# Patient Record
Sex: FEMALE | Race: BLACK OR AFRICAN AMERICAN | NOT HISPANIC OR LATINO | Employment: UNEMPLOYED
[De-identification: names, ages, dates, MRNs, and addresses within clinical notes are randomized per-mention and may not be internally consistent; named-entity substitution may affect disease eponyms.]

---

## 2017-06-17 ENCOUNTER — HEALTH MAINTENANCE LETTER (OUTPATIENT)
Age: 22
End: 2017-06-17

## 2018-08-25 ENCOUNTER — HEALTH MAINTENANCE LETTER (OUTPATIENT)
Age: 23
End: 2018-08-25

## 2019-04-16 ENCOUNTER — OFFICE VISIT (OUTPATIENT)
Dept: FAMILY MEDICINE | Facility: CLINIC | Age: 24
End: 2019-04-16
Payer: COMMERCIAL

## 2019-04-16 VITALS
BODY MASS INDEX: 38.53 KG/M2 | RESPIRATION RATE: 16 BRPM | HEART RATE: 90 BPM | TEMPERATURE: 98.7 F | HEIGHT: 67 IN | WEIGHT: 245.5 LBS | OXYGEN SATURATION: 97 % | DIASTOLIC BLOOD PRESSURE: 74 MMHG | SYSTOLIC BLOOD PRESSURE: 115 MMHG

## 2019-04-16 DIAGNOSIS — Z00.00 ROUTINE GENERAL MEDICAL EXAMINATION AT A HEALTH CARE FACILITY: Primary | ICD-10-CM

## 2019-04-16 DIAGNOSIS — J30.1 SEASONAL ALLERGIC RHINITIS DUE TO POLLEN: ICD-10-CM

## 2019-04-16 DIAGNOSIS — Z11.1 SCREENING EXAMINATION FOR PULMONARY TUBERCULOSIS: ICD-10-CM

## 2019-04-16 DIAGNOSIS — Z11.59 NEED FOR HEPATITIS C SCREENING TEST: ICD-10-CM

## 2019-04-16 DIAGNOSIS — Z11.4 ENCOUNTER FOR SCREENING FOR HIV: ICD-10-CM

## 2019-04-16 DIAGNOSIS — Z72.0 NICOTINE ABUSE: ICD-10-CM

## 2019-04-16 DIAGNOSIS — F19.10 SUBSTANCE ABUSE (H): ICD-10-CM

## 2019-04-16 LAB — HCG SERPL QL: NEGATIVE

## 2019-04-16 PROCEDURE — 99385 PREV VISIT NEW AGE 18-39: CPT | Performed by: PHYSICIAN ASSISTANT

## 2019-04-16 PROCEDURE — 86481 TB AG RESPONSE T-CELL SUSP: CPT | Performed by: PHYSICIAN ASSISTANT

## 2019-04-16 PROCEDURE — 87340 HEPATITIS B SURFACE AG IA: CPT | Performed by: PHYSICIAN ASSISTANT

## 2019-04-16 PROCEDURE — 86708 HEPATITIS A ANTIBODY: CPT | Performed by: PHYSICIAN ASSISTANT

## 2019-04-16 PROCEDURE — 84703 CHORIONIC GONADOTROPIN ASSAY: CPT | Performed by: PHYSICIAN ASSISTANT

## 2019-04-16 PROCEDURE — 86706 HEP B SURFACE ANTIBODY: CPT | Performed by: PHYSICIAN ASSISTANT

## 2019-04-16 PROCEDURE — 86709 HEPATITIS A IGM ANTIBODY: CPT | Performed by: PHYSICIAN ASSISTANT

## 2019-04-16 PROCEDURE — 86803 HEPATITIS C AB TEST: CPT | Performed by: PHYSICIAN ASSISTANT

## 2019-04-16 PROCEDURE — 87389 HIV-1 AG W/HIV-1&-2 AB AG IA: CPT | Performed by: PHYSICIAN ASSISTANT

## 2019-04-16 PROCEDURE — 36415 COLL VENOUS BLD VENIPUNCTURE: CPT | Performed by: PHYSICIAN ASSISTANT

## 2019-04-16 RX ORDER — CETIRIZINE HYDROCHLORIDE 10 MG/1
10 TABLET ORAL DAILY
Qty: 30 TABLET | Refills: 3 | Status: SHIPPED | OUTPATIENT
Start: 2019-04-16 | End: 2019-09-20

## 2019-04-16 RX ORDER — OLANZAPINE 5 MG/1
5 TABLET ORAL
COMMUNITY
End: 2019-05-10

## 2019-04-16 RX ORDER — OLANZAPINE 10 MG/1
10 TABLET ORAL AT BEDTIME
COMMUNITY
End: 2019-05-21

## 2019-04-16 RX ORDER — NICOTINE 21 MG/24HR
1 PATCH, TRANSDERMAL 24 HOURS TRANSDERMAL EVERY 24 HOURS
Qty: 30 PATCH | Refills: 1 | Status: CANCELLED | OUTPATIENT
Start: 2019-04-16

## 2019-04-16 RX ORDER — FLUTICASONE PROPIONATE 50 MCG
2 SPRAY, SUSPENSION (ML) NASAL DAILY
Qty: 16 G | Refills: 3 | Status: SHIPPED | OUTPATIENT
Start: 2019-04-16 | End: 2019-09-20

## 2019-04-16 ASSESSMENT — MIFFLIN-ST. JEOR: SCORE: 1903.27

## 2019-04-16 NOTE — NURSING NOTE
"Chief Complaint   Patient presents with     Physical     Allergies     initial /74 (BP Location: Left arm, Cuff Size: Adult Large)   Pulse 90   Temp 98.7  F (37.1  C) (Oral)   Resp 16   Ht 1.689 m (5' 6.5\")   Wt 111.4 kg (245 lb 8 oz)   LMP 04/16/2019   SpO2 97%   BMI 39.03 kg/m   Estimated body mass index is 39.03 kg/m  as calculated from the following:    Height as of this encounter: 1.689 m (5' 6.5\").    Weight as of this encounter: 111.4 kg (245 lb 8 oz).  BP completed using cuff size: large.  L arm      Health Maintenance that is potentially due pending provider review:  Pap Smear    PAP--Possibly completing today, per Provider review    Molina Alan ma  "

## 2019-04-16 NOTE — PROGRESS NOTES
SUBJECTIVE:   CC: Monica Morales is an 23 year old woman who presents for preventive health visit.     Healthy Habits:    Do you get at least three servings of calcium containing foods daily (dairy, green leafy vegetables, etc.)? yes    Amount of exercise or daily activities, outside of work: 3-5 day(s) per week    Problems taking medications regularly No    Medication side effects: No    Have you had an eye exam in the past two years? no    Do you see a dentist twice per year? no    Do you have sleep apnea, excessive snoring or daytime drowsiness?no    22 y/o NP female here for check up.  She recently entered into [facility].  She has been there for 4 days, and everything is going well.  She is planning on being there long term.  She has hx of substance abuse, mostly with OTC cough syrups.  She is feeling confident in her recovery.    They have started olanzapine at night and BID prn for agitation, and it has been helping.  Tolerating well.  She was initially using nicotine patches, but she decided to stop.  Was getting rash, and making her feel weird.  She does get seasonal allergies, and requests flonase and anti histamine.  Worked well in the past.    She is currently on her period, and wishes to get pap smear in future.  PROBLEMS TO ADD ON...    Today's PHQ-2 Score: No flowsheet data found.    Abuse: Current or Past(Physical, Sexual or Emotional)- No  Do you feel safe in your environment? Yes    Social History     Tobacco Use     Smoking status: Former Smoker     Smokeless tobacco: Never Used   Substance Use Topics     Alcohol use: Not Currently     If you drink alcohol do you typically have >3 drinks per day or >7 drinks per week? No                     Reviewed orders with patient.  Reviewed health maintenance and updated orders accordingly - Yes  Labs reviewed in EPIC    Mammogram not appropriate for this patient based on age.    Pertinent mammograms are reviewed under the imaging  "tab.  History of abnormal Pap smear: NO - age 21-29 PAP every 3 years recommended     Reviewed and updated as needed this visit by clinical staff  Tobacco  Allergies  Meds  Soc Hx        Reviewed and updated as needed this visit by Provider            ROS:  CONSTITUTIONAL: NEGATIVE for fever, chills, change in weight  INTEGUMENTARU/SKIN: NEGATIVE for worrisome rashes, moles or lesions  EYES: NEGATIVE for vision changes or irritation  ENT: NEGATIVE for ear, mouth and throat problems  RESP: NEGATIVE for significant cough or SOB  BREAST: NEGATIVE for masses, tenderness or discharge  CV: NEGATIVE for chest pain, palpitations or peripheral edema  GI: NEGATIVE for nausea, abdominal pain, heartburn, or change in bowel habits  : NEGATIVE for unusual urinary or vaginal symptoms. Periods are regular.  MUSCULOSKELETAL: NEGATIVE for significant arthralgias or myalgia  NEURO: NEGATIVE for weakness, dizziness or paresthesias  PSYCHIATRIC: NEGATIVE for changes in mood or affect    OBJECTIVE:   /74 (BP Location: Left arm, Cuff Size: Adult Large)   Pulse 90   Temp 98.7  F (37.1  C) (Oral)   Resp 16   Ht 1.689 m (5' 6.5\")   Wt 111.4 kg (245 lb 8 oz)   LMP 04/16/2019   SpO2 97%   BMI 39.03 kg/m    EXAM:  GENERAL: alert, no distress and obese  EYES: Eyes grossly normal to inspection, PERRL and conjunctivae and sclerae normal  HENT: ear canals and TM's normal, nose and mouth without ulcers or lesions  NECK: no adenopathy, no asymmetry, masses, or scars and thyroid normal to palpation  RESP: lungs clear to auscultation - no rales, rhonchi or wheezes  CV: regular rate and rhythm, normal S1 S2, no S3 or S4, no murmur, click or rub, no peripheral edema and peripheral pulses strong  MS: no gross musculoskeletal defects noted, no edema  SKIN: no suspicious lesions or rashes  NEURO: Normal strength and tone, mentation intact and speech normal  PSYCH: mentation appears normal, affect normal/bright    Diagnostic Test " "Results:  none     ASSESSMENT/PLAN:   1. Routine general medical examination at a health care facility  Due for tdap in the fall.  Discussed guidelines for pap starting at age 21.  Will have her reschedule that at her discretion.  On period today.  - HCG qualitative, Blood ([id number])    2. Substance abuse (H)      3. Nicotine abuse  No longer on nicotine patches, doing ok.    4. Seasonal allergic rhinitis due to pollen    - fluticasone (FLONASE) 50 MCG/ACT nasal spray; Spray 2 sprays into both nostrils daily  Dispense: 16 g; Refill: 3  - cetirizine (ZYRTEC) 10 MG tablet; Take 1 tablet (10 mg) by mouth daily  Dispense: 30 tablet; Refill: 3    5. Encounter for screening for HIV    - HIV Antigen Antibody Combo    6. Need for hepatitis C screening test    - Hepatitis B Surface Antibody  - Hepatitis Antibody A IgG  - Hepatitis C antibody  - Hepatitis B surface antigen  - Hepatitis A antibody IgM    7. Screening examination for pulmonary tuberculosis    - Quantiferon TB Gold Plus    COUNSELING:   Reviewed preventive health counseling, as reflected in patient instructions       Regular exercise       Healthy diet/nutrition       Consider Hep C screening for patients born between 1945 and 1965       HIV screeninx in teen years, 1x in adult years, and at intervals if high risk    BP Readings from Last 1 Encounters:   19 115/74     Estimated body mass index is 39.03 kg/m  as calculated from the following:    Height as of this encounter: 1.689 m (5' 6.5\").    Weight as of this encounter: 111.4 kg (245 lb 8 oz).      Weight management plan: Discussed healthy diet and exercise guidelines     reports that she has quit smoking. She has never used smokeless tobacco.      Counseling Resources:  ATP IV Guidelines  Pooled Cohorts Equation Calculator  Breast Cancer Risk Calculator  FRAX Risk Assessment  ICSI Preventive Guidelines  Dietary Guidelines for Americans, 2010  USDA's MyPlate  ASA Prophylaxis  Lung CA " Screening    Daniel Escalante PA-C  [facility]

## 2019-04-17 LAB
HAV IGG SER QL IA: REACTIVE
HAV IGM SERPL QL IA: NONREACTIVE
HBV SURFACE AB SERPL IA-ACNC: 0.53 M[IU]/ML
HBV SURFACE AG SERPL QL IA: NONREACTIVE
HCV AB SERPL QL IA: NONREACTIVE
HIV 1+2 AB+HIV1 P24 AG SERPL QL IA: NONREACTIVE

## 2019-04-18 ENCOUNTER — TELEPHONE (OUTPATIENT)
Dept: FAMILY MEDICINE | Facility: CLINIC | Age: 24
End: 2019-04-18

## 2019-04-18 LAB
GAMMA INTERFERON BACKGROUND BLD IA-ACNC: 0.02 IU/ML
M TB IFN-G BLD-IMP: NEGATIVE
M TB IFN-G CD4+ BCKGRND COR BLD-ACNC: 9.14 IU/ML
MITOGEN IGNF BCKGRD COR BLD-ACNC: 0.02 IU/ML
MITOGEN IGNF BCKGRD COR BLD-ACNC: 0.02 IU/ML

## 2019-04-18 NOTE — TELEPHONE ENCOUNTER
Called mom   Pt at [facility] [phone]  Called and left VM for patient to call us back  Myesha MENDOZA RN

## 2019-04-18 NOTE — RESULT ENCOUNTER NOTE
Please call with results.    All labs look ok.  It does appear that she is immune to Hepatitis A, most likely from previous immunization.     Jorge Alberto Escalante PA-C

## 2019-04-18 NOTE — TELEPHONE ENCOUNTER
Notes recorded by Daniel Escalante PA-C on 4/18/2019 at 1:58 PM CDT    Please call with results.    All labs look ok.  It does appear that she is immune to Hepatitis A, most likely from previous immunization.     Jorge Alberto Escalante PA-C

## 2019-04-22 ENCOUNTER — TELEPHONE (OUTPATIENT)
Dept: PEDIATRICS | Facility: CLINIC | Age: 24
End: 2019-04-22

## 2019-04-24 NOTE — TELEPHONE ENCOUNTER
Left VM #2 for pt on [facility]  asking that staff have this patient give us a call  Myesha MENDOZA RN

## 2019-05-10 ENCOUNTER — OFFICE VISIT (OUTPATIENT)
Dept: FAMILY MEDICINE | Facility: CLINIC | Age: 24
End: 2019-05-10
Payer: COMMERCIAL

## 2019-05-10 VITALS
BODY MASS INDEX: 39.11 KG/M2 | SYSTOLIC BLOOD PRESSURE: 106 MMHG | RESPIRATION RATE: 16 BRPM | WEIGHT: 249.2 LBS | DIASTOLIC BLOOD PRESSURE: 72 MMHG | HEIGHT: 67 IN | OXYGEN SATURATION: 98 % | HEART RATE: 82 BPM

## 2019-05-10 DIAGNOSIS — F32.A ANXIETY AND DEPRESSION: ICD-10-CM

## 2019-05-10 DIAGNOSIS — E66.09 CLASS 2 OBESITY DUE TO EXCESS CALORIES WITHOUT SERIOUS COMORBIDITY WITH BODY MASS INDEX (BMI) OF 39.0 TO 39.9 IN ADULT: ICD-10-CM

## 2019-05-10 DIAGNOSIS — F19.10 SUBSTANCE ABUSE (H): ICD-10-CM

## 2019-05-10 DIAGNOSIS — E66.812 CLASS 2 OBESITY DUE TO EXCESS CALORIES WITHOUT SERIOUS COMORBIDITY WITH BODY MASS INDEX (BMI) OF 39.0 TO 39.9 IN ADULT: ICD-10-CM

## 2019-05-10 DIAGNOSIS — F41.9 ANXIETY AND DEPRESSION: ICD-10-CM

## 2019-05-10 DIAGNOSIS — Z00.00 HEALTHCARE MAINTENANCE: ICD-10-CM

## 2019-05-10 DIAGNOSIS — Z00.00 ENCOUNTER FOR MEDICAL EXAMINATION TO ESTABLISH CARE: Primary | ICD-10-CM

## 2019-05-10 LAB — HCG UR QL: NEGATIVE

## 2019-05-10 RX ORDER — OLANZAPINE 5 MG/1
5 TABLET ORAL 2 TIMES DAILY PRN
COMMUNITY
Start: 2019-05-10 | End: 2019-05-21

## 2019-05-10 RX ORDER — IBUPROFEN 400 MG/1
400 TABLET, FILM COATED ORAL EVERY 6 HOURS PRN
Qty: 100 TABLET | Refills: 1 | Status: SHIPPED | OUTPATIENT
Start: 2019-05-10 | End: 2020-05-08

## 2019-05-10 ASSESSMENT — MIFFLIN-ST. JEOR: SCORE: 1927.99

## 2019-05-10 NOTE — PATIENT INSTRUCTIONS
Here is the plan from today's visit    1. Healthcare maintenance  - Pap imaged thin layer screen only - recommended age 21 - 24 years  - Hepatitis B Surface Antibody  - Hepatitis B surface antigen  - Hepatitis A Antibody IgG  - Hepatitis C antibody  - HCG Qualitative Urine (UPT) ([facility])  - Quantiferon TB Gold Plus  - HIV Antigen Antibody Combo  - Hepatitis A antibody IgM  - NEISSERIA GONORRHOEA PCR  - CHLAMYDIA TRACHOMATIS PCR  - ibuprofen (ADVIL/MOTRIN) 400 MG tablet; Take 1 tablet (400 mg) by mouth every 6 hours as needed for moderate pain  Dispense: 100 tablet; Refill: 1    2. Class 2 obesity due to excess calories without serious comorbidity with body mass index (BMI) of 39.0 to 39.9 in adult  - Regular exercise and healthy eating     Please call or return to clinic if your symptoms don't go away.    Follow up plan  Please make a clinic appointment for follow up with me (NAYELY MIRAMONTES) in 3 months.     Thank you for coming to [facility] today.  Lab Testing:  **If you had lab testing today and your results are reassuring or normal they will be mailed to you or sent through [facility] within 7 days.   **If the lab tests need quick action we will call you with the results.  The phone number we will call with results is # [phone] (home) . If this is not the best number please call our clinic and change the number.  Medication Refills:  If you need any refills please call your pharmacy and they will contact us.   If you need to  your refill at a new pharmacy, please contact the new pharmacy directly. The new pharmacy will help you get your medications transferred faster.   Scheduling:  If you have any concerns about today's visit or wish to schedule another appointment please call our office during normal business hours [phone] (8-5:00 M-F)  If a referral was made to a [facility] and you don't get a call from central scheduling please call [phone].  If a  Mammogram was ordered for you at [facility] call [phone] to schedule or change your appointment.  If you had an XRay/CT/Ultrasound/MRI ordered the number is [phone] to schedule or change your radiology appointment.   Medical Concerns:  If you have urgent medical concerns please call [phone] at any time of the day.    Maria Teresa Carrillo, DO    Preventive Health Recommendations  Female Ages 21 to 25     Yearly exam:     See your health care provider every year in order to  o Review health changes.   o Discuss preventive care.    o Review your medicines if your doctor has prescribed any.      You should be tested each year for STDs (sexually transmitted diseases).       Talk to your provider about how often you should have cholesterol testing.      Get a Pap test every three years. If you have an abnormal result, your doctor may have you test more often.      If you are at risk for diabetes, you should have a diabetes test (fasting glucose).     Shots:     Get a flu shot each year.     Get a tetanus shot every 10 years.     Consider getting the shot (vaccine) that prevents cervical cancer (Gardasil).    Nutrition:     Eat at least 5 servings of fruits and vegetables each day.    Eat whole-grain bread, whole-wheat pasta and brown rice instead of white grains and rice.    Get adequate Calcium and Vitamin D.     Lifestyle    Exercise at least 150 minutes a week each week (30 minutes a day, 5 days a week). This will help you control your weight and prevent disease.    Limit alcohol to one drink per day.    No smoking.     Wear sunscreen to prevent skin cancer.    See your dentist every six months for an exam and cleaning.  Preventive Health Recommendations  Female Ages 21 to 25     Yearly exam:   See your health care provider every year in order to  Review health changes.   Discuss preventive care.    Review your medicines if your doctor has prescribed any.    You should be tested each year for STDs  (sexually transmitted diseases).     Talk to your provider about how often you should have cholesterol testing.    Get a Pap test every three years. If you have an abnormal result, your doctor may have you test more often.    If you are at risk for diabetes, you should have a diabetes test (fasting glucose).     Shots:   Get a flu shot each year.   Get a tetanus shot every 10 years.   Consider getting the shot (vaccine) that prevents cervical cancer (Gardasil).    Nutrition:   Eat at least 5 servings of fruits and vegetables each day.  Eat whole-grain bread, whole-wheat pasta and brown rice instead of white grains and rice.  Get adequate Calcium and Vitamin D.     Lifestyle  Exercise at least 150 minutes a week each week (30 minutes a day, 5 days a week). This will help you control your weight and prevent disease.  Limit alcohol to one drink per day.  No smoking.   Wear sunscreen to prevent skin cancer.  See your dentist every six months for an exam and cleaning.

## 2019-05-10 NOTE — LETTER
May 16, 2019      Monica A Carmen  [address] [facility] [address]        Dear Monica,    Thank you for getting your care at [facility]. Please see below for your test results. Your pap smear was abnormal with low grade lesion with recommendations to repeat pap smear in one year. Please let me know if you have any questions.     Resulted Orders   Pap imaged thin layer screen only - recommended age 21 - 24 years   Result Value Ref Range    PAP LSIL (A)     Copath Report         Acc#: [id number]   Signed: 5/15/2019 17:18   MR#: [id number]    SPECIMEN/STAIN PROCESS:  Pap imaged thin layer prep screening (Surepath, FocalPoint with guided   screening)       Pap-Cyto x 1    SOURCE: Cervical, endocervical  ----------------------------------------------------------------   Pap imaged thin layer prep screening (Surepath, FocalPoint with guided   screening)  SPECIMEN ADEQUACY:  Satisfactory for evaluation.  -Transformation zone component present.    CYTOLOGIC INTERPRETATION:    Epithelial cell abnormality:  squamous cell:  low-grade squamous   intraepithelial lesion (LSIL)    Electronically signed by: Sadiq Hooker M.D.    CLINICAL HISTORY:  LMP: 04/16/2019    Papanicolaou Test Limitations:  Cervical cytology is a screening test with   limited sensitivity; regular  screening is critical for cancer prevention; Pap tests are primarily   effective for the diagnosis/prevention of  squamous cell carcinoma, not adenocarcinomas or other cancers.    The technical component  of this testing was completed at the [facility], with the professional component performed   at the [facility], [address] [place] [phone] ([phone])       Hepatitis B Surface Antibody   Result Value Ref Range    Hepatitis B Surface Antibody 0.57 <8.00 m[IU]/mL      Comment:      Nonreactive, No antibody detected  when the value is less than 8.00 m[IU]/mL.   Hepatitis B surface antigen   Result Value Ref Range    Hep B Surface Agn Nonreactive NR^Nonreactive   Hepatitis A Antibody IgG   Result Value Ref Range    Hepatitis A Antibody IgG Reactive (AA) NR^Nonreactive      Comment:      This assay cannot be used for the diagnosis of acute HAV infection.   Hepatitis C antibody   Result Value Ref Range    Hepatitis C Antibody Nonreactive NR^Nonreactive      Comment:      Assay performance characteristics have not been established for newborns,   infants, and children     HCG Qualitative Urine (UPT) ([facility])   Result Value Ref Range    HCG Qual Urine NEGATIVE Negative   Quantiferon TB Gold Plus   Result Value Ref Range    Quantiferon-TB Gold Plus Result Negative NEG^Negative      Comment:      No interferon gamma response to M.tuberculosis antigens was detected.   Infection with M.tuberculosis is unlikely, however a single negative result   does not exclude infection. In patients at high risk for infection, a second   test should be considered  in accordance with the 2017 ATS/IDSA/CDC Clinical Practice Guidelines for   Diagnosis of Tuberculosis in Adults and Children [Josueinsmabel HENDRICKS et   al.Clin.Infect.Dis. 2017 64(2):111-115].      TB1 Ag minus Nil Value 0.02 IU/mL    TB2 Ag minus Nil Value 0.01 IU/mL    Mitogen minus Nil Result >10.00 IU/mL    Nil Result 0.08 IU/mL   HIV Antigen Antibody Combo   Result Value Ref Range    HIV Antigen Antibody Combo Nonreactive NR^Nonreactive          Comment:      HIV-1 p24 Ag & HIV-1/HIV-2 Ab Not Detected   Hepatitis A antibody IgM   Result Value Ref Range    Hepatitis A IgM Emily Nonreactive NR^Nonreactive      Comment:      IgM anti-HAV not detected. Does not exclude the possibility of recent exposure   to or infection with HAV.     NEISSERIA GONORRHOEA PCR   Result Value Ref Range    Specimen Descrip Cervical     N Gonorrhea PCR Negative NEG^Negative      Comment:      Negative for N. gonorrhoeae  rRNA by transcription mediated amplification.  A negative result by transcription mediated amplification does not preclude   the presence of N. gonorrhoeae infection because results are dependent on   proper and adequate collection, absence of inhibitors, and sufficient rRNA to   be detected.     CHLAMYDIA TRACHOMATIS PCR   Result Value Ref Range    Specimen Description Cervical     Chlamydia Trachomatis PCR Negative NEG^Negative      Comment:      Negative for C. trachomatis rRNA by transcription mediated amplification.  A negative result by transcription mediated amplification does not preclude   the presence of C. trachomatis infection because results are dependent on   proper and adequate collection, absence of inhibitors, and sufficient rRNA to   be detected.         If you have any concerns about these results please call and leave a message for me or send a [facility] message to the clinic.    Sincerely,    Maria Teresa Carrillo, DO

## 2019-05-10 NOTE — PROGRESS NOTES
Preceptor Attestation:   Patient seen, evaluated and discussed with the resident. I have verified the content of the note, which accurately reflects my assessment of the patient and the plan of care.   Supervising Physician:  Gamal Rosario MD

## 2019-05-10 NOTE — PROGRESS NOTES
Female Physical Note          HPI       Concerns today: No special concerns today. Needs labs requested by [facility].      Patient Active Problem List   Diagnosis     Obesity     Substance abuse (H)     Seasonal allergic rhinitis due to pollen     Routine general medical examination at a health care facility     Anxiety and depression     Past Medical History:   Diagnosis Date     Anxiety      Depressive disorder    Reports a history of anxiety, depression, and PTSD that was recently diagnosed and she is at [facility]. She reports that she has an upcoming psychiatry appointment that is being arranged by [facility].  No suicidal or homicidal ideation. No self injury.      Family History   Problem Relation Age of Onset     Asthma Father      Hypertension Father      Alcohol/Drug Father      Hypertension Paternal Grandmother      Depression Mother      Diabetes Mother            Review of Systems:     Review of Systems:  CONSTITUTIONAL: NEGATIVE for fever, chills, change in weight  INTEGUMENTARY/SKIN: NEGATIVE for worrisome rashes, moles or lesions  EYES: NEGATIVE for vision changes or irritation  ENT/MOUTH: NEGATIVE for ear, mouth and throat problems  RESP: NEGATIVE for significant cough or SOB  BREAST: NEGATIVE for masses, tenderness or discharge  CV: NEGATIVE for chest pain, palpitations or peripheral edema  GI: NEGATIVE for nausea, abdominal pain, heartburn, or change in bowel habits  : NEGATIVE for frequency, dysuria, or hematuria  MUSCULOSKELETAL: NEGATIVE for significant arthralgias or myalgia  NEURO: NEGATIVE for weakness, dizziness or paresthesias  ENDOCRINE: NEGATIVE for temperature intolerance, skin/hair changes  HEME/ALLERGY: NEGATIVE for bleeding problems  PSYCHIATRIC: +anxiety and irritable mood           Social History     Social History     Socioeconomic History     Marital status: Single     Spouse name: Not on file     Number of children: Not on  "file     Years of education: Not on file     Highest education level: Not on file   Occupational History     Not on file   Social Needs     Financial resource strain: Not on file     Food insecurity:     Worry: Not on file     Inability: Not on file     Transportation needs:     Medical: Not on file     Non-medical: Not on file   Tobacco Use     Smoking status: Former Smoker     Smokeless tobacco: Never Used   Substance and Sexual Activity     Alcohol use: Not Currently     Drug use: Not Currently     Sexual activity: Not Currently     Partners: Male, Female   She has been residing at [facility] for 28 days and reports that it is going well. Previous substance abuse of OTC cough medicine and marijuana.     Has anyone hurt you physically, for example by pushing, hitting, slapping or kicking you or forcing you to have sex? Denies  Do you feel threatened or controlled by a partner, ex-partner or anyone in your life? Denies    Sexual Health     Sexual concerns: No   STI History: Neg  Pregnancy History: No obstetric history on file.  LMP Patient's last menstrual period was 04/16/2019. Normal menses and flow.   Last Pap Smear Date: No results found for: PAP  Abnormal Pap History: None    Recommended Screening     Pap every 3 years for women 21-29. Recommended and patient accepted testing.         Physical Exam:     Vitals: /72   Pulse 82   Resp 16   Ht 1.702 m (5' 7\")   Wt 113 kg (249 lb 3.2 oz)   LMP 04/16/2019   SpO2 98%   BMI 39.03 kg/m    BMI= Body mass index is 39.03 kg/m .   GENERAL: healthy, alert and no distress, obese female  EYES: Eyes grossly normal to inspection, extraocular movements - intact, and PERRL  HENT: ear canals- normal; TMs- normal; Nose- normal; Mouth- no ulcers, no lesions  NECK: no tenderness, no adenopathy, no asymmetry, no masses, no stiffness; thyroid- normal to palpation  RESP: lungs clear to auscultation - no rales, no rhonchi, no wheezes  CV: regular rates and " rhythm, normal S1 S2, no S3 or S4 and no murmur, no click or rub -  ABDOMEN: soft, no tenderness, no  hepatosplenomegaly, no masses, normal bowel sounds  MS: extremities- no gross deformities noted, no edema  SKIN: no suspicious lesions, no rashes  NEURO: strength and tone- normal, sensory exam- grossly normal, mentation- intact, speech- normal, reflexes- symmetric  BACK: no CVA tenderness, no paralumbar tenderness  - female: cervix- normal, adnexae- normal; uterus- normal, no masses, no discharge  PSYCH: Alert and oriented times 3; speech- coherent , normal rate and volume; able to articulate logical thoughts, able to abstract reason, no tangential thoughts, no hallucinations or delusions, affect- normal  LYMPHATICS: ant. cervical- normal, post. cervical- normal, axillary- normal, supraclavicular- normal      Assessment and Plan      Monica was seen today for physical.    Diagnoses and all orders for this visit:    Encounter for medical examination to establish care    Healthcare maintenance  -     Pap imaged thin layer screen only - recommended age 21 - 24 years  -     Hepatitis B Surface Antibody  -     Hepatitis B surface antigen  -     Hepatitis A Antibody IgG  -     Hepatitis C antibody  -     HCG Qualitative Urine (UPT) ([facility])  -     Quantiferon TB Gold Plus  -     HIV Antigen Antibody Combo  -     Hepatitis A antibody IgM  -     NEISSERIA GONORRHOEA PCR  -     CHLAMYDIA TRACHOMATIS PCR  -     ibuprofen (ADVIL/MOTRIN) 400 MG tablet; Take 1 tablet (400 mg) by mouth every 6 hours as needed for moderate pain  -     ADMIN VACCINE, INITIAL  -     HPV9 (Gardasil 9 )    Class 2 obesity due to excess calories without serious comorbidity with body mass index (BMI) of 39.0 to 39.9 in adult  -     Recommended weight loss with lifestyle changes including healthy dietary choices and regular exercise  -     Follow up in 3 months     Anxiety and depression  - Reports personal history of anxiety and depression and has  been taking zyprexa at bedtime. She plans to meet with psychiatry through her current program [facility].     Substance abuse (H)  - Continue treatment program and she has been sober 28 days     1.Contraception: Details: Declined, plans abstinence    Options for treatment and follow-up care were reviewed with the patient . Monica Morales and/or guardian engaged in the decision making process and verbalized understanding of the options discussed and agreed with the final plan.    Maria Teresa Carrillo, DO

## 2019-05-12 LAB
C TRACH DNA SPEC QL NAA+PROBE: NEGATIVE
N GONORRHOEA DNA SPEC QL NAA+PROBE: NEGATIVE
SPECIMEN SOURCE: NORMAL
SPECIMEN SOURCE: NORMAL

## 2019-05-13 LAB
GAMMA INTERFERON BACKGROUND BLD IA-ACNC: 0.08 IU/ML
HAV IGG SER QL IA: REACTIVE
HAV IGM SERPL QL IA: NONREACTIVE
HBV SURFACE AB SERPL IA-ACNC: 0.57 M[IU]/ML
HBV SURFACE AG SERPL QL IA: NONREACTIVE
HCV AB SERPL QL IA: NONREACTIVE
HIV 1+2 AB+HIV1 P24 AG SERPL QL IA: NONREACTIVE
M TB IFN-G BLD-IMP: NEGATIVE
M TB IFN-G CD4+ BCKGRND COR BLD-ACNC: >10 IU/ML
MITOGEN IGNF BCKGRD COR BLD-ACNC: 0.01 IU/ML
MITOGEN IGNF BCKGRD COR BLD-ACNC: 0.02 IU/ML

## 2019-05-14 PROBLEM — F41.9 ANXIETY AND DEPRESSION: Status: ACTIVE | Noted: 2019-05-14

## 2019-05-14 PROBLEM — F32.A ANXIETY AND DEPRESSION: Status: ACTIVE | Noted: 2019-05-14

## 2019-05-15 LAB
COPATH REPORT: ABNORMAL
PAP: ABNORMAL

## 2019-05-16 ENCOUNTER — TELEPHONE (OUTPATIENT)
Dept: FAMILY MEDICINE | Facility: CLINIC | Age: 24
End: 2019-05-16

## 2019-05-16 DIAGNOSIS — R87.612 LOW GRADE SQUAMOUS INTRAEPITHELIAL LESION ON CYTOLOGIC SMEAR OF CERVIX (LGSIL): ICD-10-CM

## 2019-05-16 NOTE — TELEPHONE ENCOUNTER
PCP: Gamal Benites  23 year old    Pap on 5/10/19 Result: LSIL   HPV: N/A  Pertinent History: None    Plan: Cytology (pap only) in 12 months    Problem list (and overview) has been updated: Yes  Health care Maintenance has been updated: Yes    Patient contacted with results and plan via letter to [facility]     Maria Teresa Carrillo

## 2019-05-21 ENCOUNTER — OFFICE VISIT (OUTPATIENT)
Dept: FAMILY MEDICINE | Facility: CLINIC | Age: 24
End: 2019-05-21
Payer: COMMERCIAL

## 2019-05-21 VITALS
SYSTOLIC BLOOD PRESSURE: 108 MMHG | BODY MASS INDEX: 38.04 KG/M2 | RESPIRATION RATE: 16 BRPM | OXYGEN SATURATION: 99 % | WEIGHT: 242.4 LBS | HEART RATE: 64 BPM | DIASTOLIC BLOOD PRESSURE: 69 MMHG | TEMPERATURE: 98.4 F | HEIGHT: 67 IN

## 2019-05-21 DIAGNOSIS — Z00.00 HEALTHCARE MAINTENANCE: ICD-10-CM

## 2019-05-21 DIAGNOSIS — F33.41 RECURRENT MAJOR DEPRESSIVE DISORDER, IN PARTIAL REMISSION (H): Primary | ICD-10-CM

## 2019-05-21 DIAGNOSIS — S99.921A INJURY OF TOE ON RIGHT FOOT, INITIAL ENCOUNTER: ICD-10-CM

## 2019-05-21 RX ORDER — BUPROPION HYDROCHLORIDE 150 MG/1
150 TABLET ORAL EVERY MORNING
Qty: 14 TABLET | Refills: 0 | Status: SHIPPED | OUTPATIENT
Start: 2019-05-21 | End: 2019-06-04

## 2019-05-21 RX ORDER — OLANZAPINE 10 MG/1
TABLET ORAL
Qty: 12 TABLET | Refills: 0 | Status: SHIPPED | OUTPATIENT
Start: 2019-05-21 | End: 2019-06-04

## 2019-05-21 ASSESSMENT — MIFFLIN-ST. JEOR: SCORE: 1902.27

## 2019-05-21 ASSESSMENT — PATIENT HEALTH QUESTIONNAIRE - PHQ9: SUM OF ALL RESPONSES TO PHQ QUESTIONS 1-9: 14

## 2019-05-21 NOTE — PROGRESS NOTES
[facility]       Monica Morales is a 23 year old  who presents for   Chief Complaint   Patient presents with     Medication Problem     Was given Zyprexa in the psych tyson 2 months ago and has had suicidal thoughts since taking. She would like to discuss switching to something else that will help her anxiety and depression.     Refill Request     Zyrtec     Minor Surgery     right big toe nail fell off yesterday and has been painful.     Forms     Teen challange         Zyprexa Medication   Patient unable to recall why she was placed on Zyprexa.  Per chart review, she was placed on this medication help with agitation.  Since she has been on this medication she feels like she has increased suicidal thoughts.  Patient interested in starting another medication to help with her anxiety and depression.  Reports family (e.g. Her mother) has done well on Wellbutrin in the past.  She does recall being on Prozac and did not like the side effects but was unable to divulge what they were.  She reports that she is doing well otherwise at her substance treatment at [facility].      Right Great Toe Nail  Patient reports a part of her right great toenail follow-up the other day.  Has been intermittently tender.  She is able to wear shoes without outstanding discomfort.  There is no pus or drainage from the toe or redness.  She cannot recall any trauma to her toe.  She denies any previous history of nail fungus, nail brittleness or other concerns.      Refill request for Zyrtec  Reports seasonal allergies which have been exacerbated due to the pollen.  She reports good control with Zyrtec.  Requesting refill medication today. Discussed patient still has refills available.       Hepatitis B immunity  Labs obtained from being admitted to the [facility] and noted the patient is teen challenge program not have immunity to hepatitis B.  Patient reports receiving the vaccines previously.  Discussed with  "patient options of being revaccinated versus opting out.       Problem, Medication and Allergy Lists were reviewed and updated if needed..    Patient is an established patient of this clinic..         Review of Systems:   Review of Systems  10 point ROS neg other than the symptoms noted above in the HPI.         Physical Exam:     Vitals:    05/21/19 1010   BP: 108/69   BP Location: Left arm   Patient Position: Sitting   Cuff Size: Adult Large   Pulse: 64   Resp: 16   Temp: 98.4  F (36.9  C)   TempSrc: Oral   SpO2: 99%   Weight: 110 kg (242 lb 6.4 oz)   Height: 1.71 m (5' 7.32\")     Body mass index is 37.6 kg/m .  Vitals were reviewed and were normal     Physical Exam   Constitutional: She appears well-developed and well-nourished. No distress.   HENT:   Head: Normocephalic and atraumatic.   Cardiovascular: Normal rate, regular rhythm, normal heart sounds and intact distal pulses.   Pulmonary/Chest: Effort normal and breath sounds normal. No stridor. No respiratory distress. She has no wheezes.   Musculoskeletal:        Feet:    Neurological: She is alert.   Skin: Skin is warm and dry. Capillary refill takes less than 2 seconds.   Psychiatric:   Odd affect           Results:      Results from this visit  Results for orders placed or performed in visit on 05/10/19   Pap imaged thin layer screen only - recommended age 21 - 24 years   Result Value Ref Range    PAP LSIL (A)     Copath Report         Acc#: [id number]   Signed: 5/15/2019 17:18   MR#: [id number]    SPECIMEN/STAIN PROCESS:  Pap imaged thin layer prep screening (Surepath, FocalPoint with guided   screening)       Pap-Cyto x 1    SOURCE: Cervical, endocervical  ----------------------------------------------------------------   Pap imaged thin layer prep screening (Surepath, FocalPoint with guided   screening)  SPECIMEN ADEQUACY:  Satisfactory for evaluation.  -Transformation zone component present.    CYTOLOGIC INTERPRETATION:    Epithelial cell abnormality:  " squamous cell:  low-grade squamous   intraepithelial lesion (LSIL)    Electronically signed by: Sadiq Hooker M.D.    CLINICAL HISTORY:  LMP: 04/16/2019    Papanicolaou Test Limitations:  Cervical cytology is a screening test with   limited sensitivity; regular  screening is critical for cancer prevention; Pap tests are primarily   effective for the diagnosis/prevention of  squamous cell carcinoma, not adenocarcinomas or other cancers.    The technical component  of this testing was completed at the [facility], with the professional component performed   at the [facility], [address] [place] [id number] ([phone])       Hepatitis B Surface Antibody   Result Value Ref Range    Hepatitis B Surface Antibody 0.57 <8.00 m[IU]/mL   Hepatitis B surface antigen   Result Value Ref Range    Hep B Surface Agn Nonreactive NR^Nonreactive   Hepatitis A Antibody IgG   Result Value Ref Range    Hepatitis A Antibody IgG Reactive (AA) NR^Nonreactive   Hepatitis C antibody   Result Value Ref Range    Hepatitis C Antibody Nonreactive NR^Nonreactive   HCG Qualitative Urine (UPT) ([facility])   Result Value Ref Range    HCG Qual Urine NEGATIVE Negative   HIV Antigen Antibody Combo   Result Value Ref Range    HIV Antigen Antibody Combo Nonreactive NR^Nonreactive       Hepatitis A antibody IgM   Result Value Ref Range    Hepatitis A IgM Emily Nonreactive NR^Nonreactive   Quantiferon TB Gold Plus   Result Value Ref Range    Quantiferon-TB Gold Plus Result Negative NEG^Negative    TB1 Ag minus Nil Value 0.02 IU/mL    TB2 Ag minus Nil Value 0.01 IU/mL    Mitogen minus Nil Result >10.00 IU/mL    Nil Result 0.08 IU/mL   NEISSERIA GONORRHOEA PCR   Result Value Ref Range    Specimen Descrip Cervical     N Gonorrhea PCR Negative NEG^Negative   CHLAMYDIA TRACHOMATIS PCR   Result Value Ref Range    Specimen Description  Cervical     Chlamydia Trachomatis PCR Negative NEG^Negative       Assessment and Plan        Monica Larry is a 24 y/o female who was seen today for medication problem, refill request, minor surgery and forms.    Recurrent major depressive disorder, in partial remission (H)  As patient continues to have suicidal ideation while being on Zyprexa will slowly taper off this medication.  Will decrease to 10 mg this week, then to 5 mg next week and then to stop medication completely.  Additionally will start Wellbutrin today and have patient follow-up in 1 to 2 weeks to assess how she is doing.  -     OLANZapine (ZYPREXA) 10 MG tablet; Decrease to 10 mg at bedtime for 1 week, then decrease to 5 mg at bedtime for 1 week then stop  -     buPROPion (WELLBUTRIN XL) 150 MG 24 hr tablet; Take 1 tablet (150 mg) by mouth every morning    Injury of toe on right foot, initial encounter  On physical exam partial nail avulsion on the right great toe.  No signs of infection including erythema, purulence or other extensive drainage.  Discussed with patient placing a Band-Aid over the area as needed for comfort and to keep the area clean.  And to return if any fevers, chills, erythema or drainage is noted from the toe.    Healthcare maintenance  Patient wanting to move forward with restarting hepatitis B vaccine series.  Obtained first of the series today.  Additionally discussed results from previous labs including her Pap.  At her age she does not meet criteria for cotesting with HPV.  Discussed because she has LSIL that she will need a repeat Pap in 1 year.  -     ADMIN VACCINE, INITIAL  -     HEPATITIS B VACCINE,ADULT,IM         Medications Discontinued During This Encounter   Medication Reason     OLANZapine (ZYPREXA) 5 MG tablet      OLANZapine (ZYPREXA) 10 MG tablet Reorder       Options for treatment and follow-up care were reviewed with the patient. Monica A Ivanaelsajoshua  engaged in the decision making process and verbalized  understanding of the options discussed and agreed with the final plan.    Nila Shfafer MD  [facility] Resident PGY-2  [phone]    Those present during this appointment were: patient and myself

## 2019-05-21 NOTE — PATIENT INSTRUCTIONS
Taper off the Zyprexa, 10 mg for 1 week then 5 mg for 1 week then stop taking medication  Start Wellbutrin 150 mg daily until follow up in 2 weeks

## 2019-05-21 NOTE — PROGRESS NOTES
Preceptor Attestation:   Patient seen, evaluated and discussed with the resident. I have verified the content of the note, which accurately reflects my assessment of the patient and the plan of care.   Supervising Physician:  Orlando Bethea MD

## 2019-06-03 DIAGNOSIS — F33.41 RECURRENT MAJOR DEPRESSIVE DISORDER, IN PARTIAL REMISSION (H): ICD-10-CM

## 2019-06-03 RX ORDER — BUPROPION HYDROCHLORIDE 150 MG/1
150 TABLET ORAL EVERY MORNING
Qty: 14 TABLET | Refills: 0 | Status: CANCELLED | OUTPATIENT
Start: 2019-06-03

## 2019-06-03 NOTE — TELEPHONE ENCOUNTER

## 2019-06-04 ENCOUNTER — OFFICE VISIT (OUTPATIENT)
Dept: FAMILY MEDICINE | Facility: CLINIC | Age: 24
End: 2019-06-04
Payer: COMMERCIAL

## 2019-06-04 VITALS
WEIGHT: 239.6 LBS | DIASTOLIC BLOOD PRESSURE: 74 MMHG | OXYGEN SATURATION: 98 % | SYSTOLIC BLOOD PRESSURE: 109 MMHG | BODY MASS INDEX: 37.17 KG/M2 | HEART RATE: 62 BPM | TEMPERATURE: 97.7 F

## 2019-06-04 DIAGNOSIS — F33.41 RECURRENT MAJOR DEPRESSIVE DISORDER, IN PARTIAL REMISSION (H): ICD-10-CM

## 2019-06-04 RX ORDER — BUPROPION HYDROCHLORIDE 150 MG/1
300 TABLET ORAL EVERY MORNING
Qty: 60 TABLET | Refills: 0 | Status: ON HOLD | OUTPATIENT
Start: 2019-06-04 | End: 2022-07-14

## 2019-06-04 ASSESSMENT — ANXIETY QUESTIONNAIRES
GAD7 TOTAL SCORE: 4
2. NOT BEING ABLE TO STOP OR CONTROL WORRYING: SEVERAL DAYS
3. WORRYING TOO MUCH ABOUT DIFFERENT THINGS: SEVERAL DAYS
5. BEING SO RESTLESS THAT IT IS HARD TO SIT STILL: NOT AT ALL
1. FEELING NERVOUS, ANXIOUS, OR ON EDGE: SEVERAL DAYS
6. BECOMING EASILY ANNOYED OR IRRITABLE: NOT AT ALL
7. FEELING AFRAID AS IF SOMETHING AWFUL MIGHT HAPPEN: NOT AT ALL

## 2019-06-04 ASSESSMENT — PATIENT HEALTH QUESTIONNAIRE - PHQ9
SUM OF ALL RESPONSES TO PHQ QUESTIONS 1-9: 2
5. POOR APPETITE OR OVEREATING: SEVERAL DAYS

## 2019-06-04 NOTE — TELEPHONE ENCOUNTER
Medication Refill Denied  Reason: Patient needs: provider visit  Provider: I have not called the patient about the Rx denial, please call.  PCS: Please notify the pharmacy, Please contact the patient to explain reasoning provided above and to schedule the patient for a provider visit with any provider..    Maria Teresa Carrillo, DO

## 2019-06-04 NOTE — PROGRESS NOTES
HPI       Monica Morales is a 23 year old  who presents for   Chief Complaint   Patient presents with     Recheck Medication     wellbutrin, wants to increase dose         Depression Follow Up  Patient recently started on Wellbutrin while titration off Zyprexa. Reports last dose of Zyprexa is tonight. She feels that her mood overall may be improving. Interested in increasing Wellbutrin. She denies any SI or HI. Continues to do well at her CD treatment. Reports slightly worsening anxiety in the setting of starting Wellbutrin.        Problem, Medication and Allergy Lists were reviewed and updated if needed..    Patient is an established patient of this clinic..         Review of Systems:   Review of Systems   Constitutional: Negative for activity change and appetite change.   Neurological: Negative for dizziness and headaches.   Psychiatric/Behavioral: Negative for confusion, decreased concentration, sleep disturbance and suicidal ideas. The patient is nervous/anxious.             Physical Exam:     Vitals:    06/04/19 0937   BP: 109/74   Pulse: 62   Temp: 97.7  F (36.5  C)   TempSrc: Oral   SpO2: 98%   Weight: 108.7 kg (239 lb 9.6 oz)     Body mass index is 37.17 kg/m .  Vitals were reviewed and were normal     Physical Exam   Constitutional: She appears well-developed. No distress.   Obese   HENT:   Head: Normocephalic and atraumatic.   Eyes: Conjunctivae are normal.   Cardiovascular: Normal rate.   Pulmonary/Chest: Effort normal.   Musculoskeletal: She exhibits no deformity.   Neurological: She is alert.   Skin: Skin is warm and dry.   Psychiatric:   Flat affect           Results:   No testing ordered today    Assessment and Plan        Monica Morales is a 22 y/o female who was seen today for recheck medication.    Recurrent major depressive disorder, in partial remission (H)  Patient reports worsening anxiety in the setting of starting new antidepressive medication. Discussed that this is a common  side effect and should improve with increasing her medication to 300 mg daily from 150 mg daily. No concerns for ongoing SI, HI or hallucinations. Recommendation to follow up in 1 month to reassess how she is doing on her current dosage as there is still some room to increase her dosage.   -     buPROPion (WELLBUTRIN XL) 150 MG 24 hr tablet; Take 2 tablets (300 mg) by mouth every morning           Medications Discontinued During This Encounter   Medication Reason     OLANZapine (ZYPREXA) 10 MG tablet      buPROPion (WELLBUTRIN XL) 150 MG 24 hr tablet        Options for treatment and follow-up care were reviewed with the patient. Monica Morales  engaged in the decision making process and verbalized understanding of the options discussed and agreed with the final plan.    Nila Shaffer MD  [facility] Resident PGY-2  [phone]    Those present during this appointment were: patient and myself

## 2019-06-05 ASSESSMENT — ANXIETY QUESTIONNAIRES: GAD7 TOTAL SCORE: 4

## 2019-06-06 ENCOUNTER — HOSPITAL ENCOUNTER (INPATIENT)
Facility: CLINIC | Age: 24
LOS: 4 days | Discharge: PSYCHIATRIC OR SUBSTANCE USE FACILITY | End: 2019-06-10
Attending: PSYCHIATRY & NEUROLOGY | Admitting: PSYCHIATRY & NEUROLOGY
Payer: COMMERCIAL

## 2019-06-06 DIAGNOSIS — F32.1 MODERATE MAJOR DEPRESSION (H): ICD-10-CM

## 2019-06-06 DIAGNOSIS — R45.851 SUICIDE IDEATION: ICD-10-CM

## 2019-06-06 LAB
AMPHETAMINES UR QL SCN: NEGATIVE
BARBITURATES UR QL: NEGATIVE
BENZODIAZ UR QL: NEGATIVE
CANNABINOIDS UR QL SCN: NEGATIVE
COCAINE UR QL: NEGATIVE
ETHANOL UR QL SCN: NEGATIVE
HCG UR QL: NEGATIVE
OPIATES UR QL SCN: NEGATIVE

## 2019-06-06 PROCEDURE — 80307 DRUG TEST PRSMV CHEM ANLYZR: CPT | Performed by: FAMILY MEDICINE

## 2019-06-06 PROCEDURE — 90791 PSYCH DIAGNOSTIC EVALUATION: CPT

## 2019-06-06 PROCEDURE — 25000132 ZZH RX MED GY IP 250 OP 250 PS 637: Performed by: CLINICAL NURSE SPECIALIST

## 2019-06-06 PROCEDURE — 81025 URINE PREGNANCY TEST: CPT | Performed by: FAMILY MEDICINE

## 2019-06-06 PROCEDURE — 99285 EMERGENCY DEPT VISIT HI MDM: CPT | Mod: Z6 | Performed by: PSYCHIATRY & NEUROLOGY

## 2019-06-06 PROCEDURE — 12400001 ZZH R&B MH UMMC

## 2019-06-06 PROCEDURE — 99285 EMERGENCY DEPT VISIT HI MDM: CPT | Mod: 25 | Performed by: PSYCHIATRY & NEUROLOGY

## 2019-06-06 PROCEDURE — 80320 DRUG SCREEN QUANTALCOHOLS: CPT | Performed by: FAMILY MEDICINE

## 2019-06-06 RX ORDER — OLANZAPINE 10 MG/2ML
5 INJECTION, POWDER, FOR SOLUTION INTRAMUSCULAR
Status: DISCONTINUED | OUTPATIENT
Start: 2019-06-06 | End: 2019-06-07

## 2019-06-06 RX ORDER — HYDROXYZINE HYDROCHLORIDE 25 MG/1
25-50 TABLET, FILM COATED ORAL EVERY 4 HOURS PRN
Status: DISCONTINUED | OUTPATIENT
Start: 2019-06-06 | End: 2019-06-10 | Stop reason: HOSPADM

## 2019-06-06 RX ORDER — ACETAMINOPHEN 325 MG/1
650 TABLET ORAL EVERY 4 HOURS PRN
Status: DISCONTINUED | OUTPATIENT
Start: 2019-06-06 | End: 2019-06-10 | Stop reason: HOSPADM

## 2019-06-06 RX ORDER — BUPROPION HYDROCHLORIDE 150 MG/1
300 TABLET ORAL EVERY MORNING
Status: DISCONTINUED | OUTPATIENT
Start: 2019-06-07 | End: 2019-06-10 | Stop reason: HOSPADM

## 2019-06-06 RX ORDER — IBUPROFEN 400 MG/1
400 TABLET, FILM COATED ORAL EVERY 6 HOURS PRN
Status: DISCONTINUED | OUTPATIENT
Start: 2019-06-06 | End: 2019-06-10 | Stop reason: HOSPADM

## 2019-06-06 RX ORDER — BISACODYL 10 MG
10 SUPPOSITORY, RECTAL RECTAL DAILY PRN
Status: DISCONTINUED | OUTPATIENT
Start: 2019-06-06 | End: 2019-06-10 | Stop reason: HOSPADM

## 2019-06-06 RX ORDER — ALUMINA, MAGNESIA, AND SIMETHICONE 2400; 2400; 240 MG/30ML; MG/30ML; MG/30ML
30 SUSPENSION ORAL EVERY 4 HOURS PRN
Status: DISCONTINUED | OUTPATIENT
Start: 2019-06-06 | End: 2019-06-10 | Stop reason: HOSPADM

## 2019-06-06 RX ORDER — CETIRIZINE HYDROCHLORIDE 10 MG/1
10 TABLET ORAL DAILY
Status: DISCONTINUED | OUTPATIENT
Start: 2019-06-07 | End: 2019-06-10 | Stop reason: HOSPADM

## 2019-06-06 RX ORDER — FLUTICASONE PROPIONATE 50 MCG
2 SPRAY, SUSPENSION (ML) NASAL DAILY
Status: DISCONTINUED | OUTPATIENT
Start: 2019-06-07 | End: 2019-06-10 | Stop reason: HOSPADM

## 2019-06-06 RX ORDER — OLANZAPINE 5 MG/1
5 TABLET ORAL
Status: DISCONTINUED | OUTPATIENT
Start: 2019-06-06 | End: 2019-06-07

## 2019-06-06 RX ADMIN — HYDROXYZINE HYDROCHLORIDE 25 MG: 25 TABLET ORAL at 21:07

## 2019-06-06 RX ADMIN — HYDROXYZINE HYDROCHLORIDE 25 MG: 25 TABLET ORAL at 22:13

## 2019-06-06 ASSESSMENT — ENCOUNTER SYMPTOMS
DECREASED CONCENTRATION: 1
CHEST TIGHTNESS: 0
DIZZINESS: 0
DYSPHORIC MOOD: 1
HALLUCINATIONS: 0
ABDOMINAL PAIN: 0
NERVOUS/ANXIOUS: 1
BACK PAIN: 0
SHORTNESS OF BREATH: 0
FEVER: 0

## 2019-06-06 ASSESSMENT — ACTIVITIES OF DAILY LIVING (ADL)
DRESS: 0-->INDEPENDENT
SWALLOWING: 0-->SWALLOWS FOODS/LIQUIDS WITHOUT DIFFICULTY
RETIRED_EATING: 0-->INDEPENDENT
DRESS: STREET CLOTHES;INDEPENDENT
ORAL_HYGIENE: INDEPENDENT
HYGIENE/GROOMING: INDEPENDENT
COGNITION: 0 - NO COGNITION ISSUES REPORTED
FALL_HISTORY_WITHIN_LAST_SIX_MONTHS: NO
TRANSFERRING: 0-->INDEPENDENT
BATHING: 0-->INDEPENDENT
LAUNDRY: UNABLE TO COMPLETE
RETIRED_COMMUNICATION: 0-->UNDERSTANDS/COMMUNICATES WITHOUT DIFFICULTY
TOILETING: 0-->INDEPENDENT
AMBULATION: 0-->INDEPENDENT

## 2019-06-06 ASSESSMENT — MIFFLIN-ST. JEOR: SCORE: 1879.91

## 2019-06-06 NOTE — PROGRESS NOTES
06/06/19 1810   Patient Belongings   Did you bring any home meds/supplements to the hospital?  No   Patient Belongings other (see comments)   Patient Belongings Put in Hospital Secure Location (Security or Locker, etc.) other (see comments)   Belongings Search Yes   Clothing Search Yes   Second Staff Isa B     Bin:  Pink bra, black jeans, black long sleeve, black packpack, black slip on shoes, black journal (w/ string), Bible, book, Notebook (2), binder, nail file    No belongings sent to security  No cell phone    A               Admission:  I am responsible for any personal items that are not sent to the safe or pharmacy.  [facility] is not responsible for loss, theft or damage of any property in my possession.    Signature:  _________________________________ Date: _______  Time: _____                                              Staff Signature:  ____________________________ Date: ________  Time: _____      2nd Staff person, if patient is unable/unwilling to sign:    Signature: ________________________________ Date: ________  Time: _____     Discharge:  [facility] has returned all of my personal belongings:    Signature: _________________________________ Date: ________  Time: _____                                          Staff Signature:  ____________________________ Date: ________  Time: _____

## 2019-06-06 NOTE — ED NOTES
ED to Behavioral Floor Handoff    SITUATION  Monica Morales is a 23 year old female who speaks English and lives in a shelter with others The patient arrived in the ED by private car from home with a complaint of Suicidal (suicidal thoughts for past few weeks with plan to OD on meth; currently at [facility] for cough medicine and marijuana abuse.)  .The patient's current symptoms started/worsened 3 month(s) ago and during this time the symptoms have increased.   In the ED, pt was diagnosed with   Final diagnoses:   Moderate major depression (H)        Initial vitals were: BP: 119/70  Pulse: 65  Temp: 97.7  F (36.5  C)  Resp: 16  SpO2: 100 %   --------  Is the patient diabetic? No   If yes, last blood glucose? --     If yes, was this treated in the ED? --  --------  Is the patient inebriated (ETOH) No or Impaired on other substances? No  MSSA done? N/A  Last MSSA score: --    Were withdrawal symptoms treated? N/A  Does the patient have a seizure history? No. If yes, date of most recent seizure--  --------  Is the patient patient experiencing suicidal ideation? reports the following suicide factors: living in [facility], wants to  cut self or right eye on any drugs she can find  Homicidal ideation? denies current or recent homicidal ideation or behaviors.    Self-injurious behavior/urges? reports current or recent self injurious behavior or ideation including cutting wrists, wants to OD on any drugs she can find.  ------  Was pt aggressive in the ED No  Was a code called No  Is the pt now cooperative? Yes  -------  Meds given in ED: Medications - No data to display   Family present during ED course? No  Family currently present? No    BACKGROUND  Does the patient have a cognitive impairment or developmental disability? No  Allergies:   Allergies   Allergen Reactions     Seasonal Allergies    .   Social demographics are   Social History     Socioeconomic History     Marital status: Single     Spouse name:  None     Number of children: None     Years of education: None     Highest education level: None   Occupational History     None   Social Needs     Financial resource strain: None     Food insecurity:     Worry: None     Inability: None     Transportation needs:     Medical: None     Non-medical: None   Tobacco Use     Smoking status: Former Smoker     Smokeless tobacco: Never Used   Substance and Sexual Activity     Alcohol use: Not Currently     Drug use: Not Currently     Sexual activity: Not Currently     Partners: Male, Female   Lifestyle     Physical activity:     Days per week: None     Minutes per session: None     Stress: None   Relationships     Social connections:     Talks on phone: None     Gets together: None     Attends [employer] service: None     Active member of club or organization: None     Attends meetings of clubs or organizations: None     Relationship status: None     Intimate partner violence:     Fear of current or ex partner: None     Emotionally abused: None     Physically abused: None     Forced sexual activity: None   Other Topics Concern     None   Social History Narrative    ** Merged History Encounter **         FAMILY INFORMATION     Date: 2009    Parent #1      Name: Ayla   Gender: female   : 1971      Email: [email or web address]        Parent #2      Name: Rsohan Sharpe   Gender: male          Siblings:  Name: Dread    : 1992        Relationship Status of Parent(s):     Who does the child live with? mother    What language(s) is/are spoken at home? English                    ASSESSMENT  Labs results   Labs Ordered and Resulted from Time of ED Arrival Up to the Time of Departure from the ED   DRUG ABUSE SCREEN 6 CHEM DEP URINE ([facility])   HCG QUALITATIVE URINE      Imaging Studies: No results found for this or any previous visit (from the past 24 hour(s)).   Most recent vital signs /70   Pulse 65   Temp 97.7  F (36.5  C) (Oral)    Resp 16   LMP 05/17/2019   SpO2 100%    Abnormal labs/tests/findings requiring intervention:---   Pain control: pt had none  Nausea control: pt had none    RECOMMENDATION  Are any infection precautions needed (MRSA, VRE, etc.)? No If yes, what infection? --  ---  Does the patient have mobility issues? independently. If yes, what device does the pt use? ---  ---  Is patient on 72 hour hold or commitment? No If on 72 hour hold, have hold and rights been given to patient? N/A  Are admitting orders written if after 10 p.m. ?N/A  Tasks needing to be completed:---     Sol Christopher    [phone] [facility]

## 2019-06-06 NOTE — ED PROVIDER NOTES
History     Chief Complaint   Patient presents with     Suicidal     suicidal thoughts for past few weeks with plan to OD on meth; currently at [facility] for cough medicine and marijuana abuse.     The history is provided by the patient and medical records.     Monica Morales is a 23 year old female who comes in due to her worsening depression with suicidal thoughts. She has been having these thoughts of suicide for the last month.  She just told staff today.  She has thoughts of overdosing on methamphetamine.  She is in [facility].  She has been there for 2.5 months.  She was using cough syrup, benzos and THC.  She has been sober since being there.  She states she went to [facility] [place] (although at this time, we could not find the visit in Care Everywhere) and was given some new medications for her anxiety.  She is not sure what they are.  She was taken off zyprexa a few weeks ago and started on wellbutrin xl.  She is now on 300 mg once a day.  She was put on zyprexa due to some psychosis and agitation she had while intoxicated prior to going to [facility].  She has not seen a regular therapist and has not seen the psychiatrist at [facility] yet.  She has been getting her medications through [employer].  She feels hopeless, helpless and worthless. She sees no future and and no reason to live.    Please see the 's assessment in [facility] from today (6/6/19) for further details.    I have reviewed the Medications, Allergies, Past Medical and Surgical History, and Social History in the Epic system.    Review of Systems   Constitutional: Negative for fever.   Eyes: Negative for visual disturbance.   Respiratory: Negative for chest tightness and shortness of breath.    Cardiovascular: Negative for chest pain.   Gastrointestinal: Negative for abdominal pain.   Musculoskeletal: Negative for back pain.   Neurological: Negative for dizziness.   Psychiatric/Behavioral: Positive for decreased  concentration, dysphoric mood and suicidal ideas. Negative for hallucinations and self-injury. The patient is nervous/anxious.    All other systems reviewed and are negative.      Physical Exam   BP: 119/70  Pulse: 65  Temp: 97.7  F (36.5  C)  Resp: 16  SpO2: 100 %      Physical Exam   Constitutional: She is oriented to person, place, and time. She appears well-developed and well-nourished.   Cardiovascular: Normal rate, regular rhythm and normal heart sounds.   Pulmonary/Chest: Effort normal and breath sounds normal. No respiratory distress.   Neurological: She is alert and oriented to person, place, and time.   Psychiatric: Her speech is normal. Judgment normal. She is slowed and withdrawn. She is not actively hallucinating. Thought content is not paranoid and not delusional. Cognition and memory are normal. She exhibits a depressed mood. She expresses suicidal ideation. She expresses no homicidal ideation. She expresses suicidal plans. She expresses no homicidal plans.   Monica is a 22 y/o female who looks her age.  She is well groomed with poor eye contact.   Nursing note and vitals reviewed.      ED Course        Procedures               Labs Ordered and Resulted from Time of ED Arrival Up to the Time of Departure from the ED   DRUG ABUSE SCREEN 6 CHEM DEP URINE ([facility])   HCG QUALITATIVE URINE            Assessments & Plan (with Medical Decision Making)   Monica will be admitted to the hospital due to her worsening depression, hopelessness and suicidal thoughts with plan.  She will go to station 4a under Dr. Engle.    I have reviewed the nursing notes.    I have reviewed the findings, diagnosis, plan and need for follow up with the patient.       Medication List      There are no discharge medications for this visit.         Final diagnoses:   Moderate major depression (H)       6/6/2019   [facility], [facility], EMERGENCY DEPARTMENT     Rachid Reed MD  06/06/19 [id number]

## 2019-06-06 NOTE — ED NOTES
This patient was identified by our triage system as having a potential for self-harm. I have performed a brief in-person assessment of the patient s needs for their safety while in our Emergency Department. Based on my preliminarily assessment, I have no reason to believe the patient is in imminent danger of self-harm while undergoing their evaluation. I believe the patient will be safe during their evaluation in the Emergency Department under our established protocols without 1:1 supervision at this time.     MD Korey Ely Ford Christian, MD  06/06/19 5314

## 2019-06-06 NOTE — PHARMACY-ADMISSION MEDICATION HISTORY
Admission medication history for the June 6, 2019 admission is complete.     Interview sources:    - Patient    Reliability of source:   - Good; patient was able to recall names, strengths, and last doses    Medication compliance:   - Good; patient is in [facility] and reports rarely missing doses    Preferred Outpatient Pharmacy:   - [facility]    Changes made to PTA medication list (reason)  Added:   - None  Deleted:   - None  Changed:   - None    Additional medication history information:   - Patient takes ibuprofen as needed for pain and cramps during her menstrual period.   - Patient stated that she was unsure if the Wellbutrin is two 150mg tablets or one 300mg tablet, but is certain that she is taking 300mg every morning.     Prior to Admission Medication List:  Prior to Admission medications    Medication Sig Last Dose Taking? Auth Provider   buPROPion (WELLBUTRIN XL) 150 MG 24 hr tablet Take 2 tablets (300 mg) by mouth every morning 6/6/2019 at AM Yes Orlando Bethea MD   cetirizine (ZYRTEC) 10 MG tablet Take 1 tablet (10 mg) by mouth daily 6/6/2019 at AM Yes Daniel Escalante PA-C   fluticasone (FLONASE) 50 MCG/ACT nasal spray Spray 2 sprays into both nostrils daily 6/6/2019 at AM Yes Daniel Escalante PA-C   ibuprofen (ADVIL/MOTRIN) 400 MG tablet Take 1 tablet (400 mg) by mouth every 6 hours as needed for moderate pain Past Month at PRN Yes Gamal Rosario MD       Time spent: 20 minutes    Medication history completed by:   Frances Gray, Pharmacy Intern

## 2019-06-07 LAB
ALBUMIN SERPL-MCNC: 3.5 G/DL (ref 3.4–5)
ALP SERPL-CCNC: 114 U/L (ref 40–150)
ALT SERPL W P-5'-P-CCNC: 12 U/L (ref 0–50)
ANION GAP SERPL CALCULATED.3IONS-SCNC: 7 MMOL/L (ref 3–14)
AST SERPL W P-5'-P-CCNC: 13 U/L (ref 0–45)
BASOPHILS # BLD AUTO: 0 10E9/L (ref 0–0.2)
BASOPHILS NFR BLD AUTO: 0.1 %
BILIRUB SERPL-MCNC: 0.6 MG/DL (ref 0.2–1.3)
BUN SERPL-MCNC: 8 MG/DL (ref 7–30)
CALCIUM SERPL-MCNC: 8.9 MG/DL (ref 8.5–10.1)
CHLORIDE SERPL-SCNC: 108 MMOL/L (ref 94–109)
CHOLEST SERPL-MCNC: 159 MG/DL
CO2 SERPL-SCNC: 25 MMOL/L (ref 20–32)
CREAT SERPL-MCNC: 0.83 MG/DL (ref 0.52–1.04)
DIFFERENTIAL METHOD BLD: NORMAL
EOSINOPHIL # BLD AUTO: 0.1 10E9/L (ref 0–0.7)
EOSINOPHIL NFR BLD AUTO: 1.6 %
ERYTHROCYTE [DISTWIDTH] IN BLOOD BY AUTOMATED COUNT: 11.8 % (ref 10–15)
GFR SERPL CREATININE-BSD FRML MDRD: >90 ML/MIN/{1.73_M2}
GLUCOSE SERPL-MCNC: 88 MG/DL (ref 70–99)
HCT VFR BLD AUTO: 40.1 % (ref 35–47)
HDLC SERPL-MCNC: 51 MG/DL
HGB BLD-MCNC: 13.3 G/DL (ref 11.7–15.7)
IMM GRANULOCYTES # BLD: 0 10E9/L (ref 0–0.4)
IMM GRANULOCYTES NFR BLD: 0.1 %
LDLC SERPL CALC-MCNC: 81 MG/DL
LYMPHOCYTES # BLD AUTO: 1.8 10E9/L (ref 0.8–5.3)
LYMPHOCYTES NFR BLD AUTO: 23.9 %
MCH RBC QN AUTO: 29.8 PG (ref 26.5–33)
MCHC RBC AUTO-ENTMCNC: 33.2 G/DL (ref 31.5–36.5)
MCV RBC AUTO: 90 FL (ref 78–100)
MONOCYTES # BLD AUTO: 0.4 10E9/L (ref 0–1.3)
MONOCYTES NFR BLD AUTO: 4.6 %
NEUTROPHILS # BLD AUTO: 5.3 10E9/L (ref 1.6–8.3)
NEUTROPHILS NFR BLD AUTO: 69.7 %
NONHDLC SERPL-MCNC: 108 MG/DL
NRBC # BLD AUTO: 0 10*3/UL
NRBC BLD AUTO-RTO: 0 /100
PLATELET # BLD AUTO: 230 10E9/L (ref 150–450)
POTASSIUM SERPL-SCNC: 4.1 MMOL/L (ref 3.4–5.3)
PROT SERPL-MCNC: 6.8 G/DL (ref 6.8–8.8)
RBC # BLD AUTO: 4.46 10E12/L (ref 3.8–5.2)
SODIUM SERPL-SCNC: 140 MMOL/L (ref 133–144)
TRIGL SERPL-MCNC: 137 MG/DL
TSH SERPL DL<=0.005 MIU/L-ACNC: 1.18 MU/L (ref 0.4–4)
WBC # BLD AUTO: 7.6 10E9/L (ref 4–11)

## 2019-06-07 PROCEDURE — 36415 COLL VENOUS BLD VENIPUNCTURE: CPT | Performed by: CLINICAL NURSE SPECIALIST

## 2019-06-07 PROCEDURE — 80053 COMPREHEN METABOLIC PANEL: CPT | Performed by: CLINICAL NURSE SPECIALIST

## 2019-06-07 PROCEDURE — G0177 OPPS/PHP; TRAIN & EDUC SERV: HCPCS

## 2019-06-07 PROCEDURE — 85025 COMPLETE CBC W/AUTO DIFF WBC: CPT | Performed by: CLINICAL NURSE SPECIALIST

## 2019-06-07 PROCEDURE — 99222 1ST HOSP IP/OBS MODERATE 55: CPT | Performed by: CLINICAL NURSE SPECIALIST

## 2019-06-07 PROCEDURE — 80061 LIPID PANEL: CPT | Performed by: CLINICAL NURSE SPECIALIST

## 2019-06-07 PROCEDURE — H2032 ACTIVITY THERAPY, PER 15 MIN: HCPCS

## 2019-06-07 PROCEDURE — 84443 ASSAY THYROID STIM HORMONE: CPT | Performed by: CLINICAL NURSE SPECIALIST

## 2019-06-07 PROCEDURE — 25000132 ZZH RX MED GY IP 250 OP 250 PS 637: Performed by: CLINICAL NURSE SPECIALIST

## 2019-06-07 PROCEDURE — 12400001 ZZH R&B MH UMMC

## 2019-06-07 RX ORDER — OLANZAPINE 10 MG/2ML
5 INJECTION, POWDER, FOR SOLUTION INTRAMUSCULAR 3 TIMES DAILY PRN
Status: DISCONTINUED | OUTPATIENT
Start: 2019-06-07 | End: 2019-06-10

## 2019-06-07 RX ORDER — OLANZAPINE 10 MG/2ML
5 INJECTION, POWDER, FOR SOLUTION INTRAMUSCULAR
Status: DISCONTINUED | OUTPATIENT
Start: 2019-06-07 | End: 2019-06-07

## 2019-06-07 RX ORDER — PROPRANOLOL HYDROCHLORIDE 10 MG/1
20 TABLET ORAL 2 TIMES DAILY
Status: DISCONTINUED | OUTPATIENT
Start: 2019-06-07 | End: 2019-06-10 | Stop reason: HOSPADM

## 2019-06-07 RX ORDER — OLANZAPINE 5 MG/1
5 TABLET ORAL 3 TIMES DAILY PRN
Status: DISCONTINUED | OUTPATIENT
Start: 2019-06-07 | End: 2019-06-10

## 2019-06-07 RX ORDER — OLANZAPINE 5 MG/1
5 TABLET ORAL 3 TIMES DAILY PRN
Status: DISCONTINUED | OUTPATIENT
Start: 2019-06-07 | End: 2019-06-07

## 2019-06-07 RX ADMIN — PROPRANOLOL HYDROCHLORIDE 20 MG: 10 TABLET ORAL at 21:49

## 2019-06-07 RX ADMIN — HYDROXYZINE HYDROCHLORIDE 50 MG: 25 TABLET ORAL at 21:00

## 2019-06-07 RX ADMIN — FLUTICASONE PROPIONATE 2 SPRAY: 50 SPRAY, METERED NASAL at 08:39

## 2019-06-07 RX ADMIN — BUPROPION HYDROCHLORIDE 300 MG: 150 TABLET, FILM COATED, EXTENDED RELEASE ORAL at 08:39

## 2019-06-07 RX ADMIN — HYDROXYZINE HYDROCHLORIDE 25 MG: 25 TABLET ORAL at 14:08

## 2019-06-07 RX ADMIN — CETIRIZINE HYDROCHLORIDE 10 MG: 10 TABLET, FILM COATED ORAL at 08:39

## 2019-06-07 ASSESSMENT — ACTIVITIES OF DAILY LIVING (ADL)
DRESS: SCRUBS (BEHAVIORAL HEALTH);INDEPENDENT
ORAL_HYGIENE: INDEPENDENT
HYGIENE/GROOMING: INDEPENDENT
LAUNDRY: UNABLE TO COMPLETE

## 2019-06-07 NOTE — PLAN OF CARE
"48 HOUR ASSESSMENT   Patient's mood, anxiety, thoughts and behavior continue to be assessed.   Pt is progressing toward goals.   Encourage participation in groups and developing healthy coping skills. She denies concerns regarding sleep, appetite, and medication side effects.    Goal for Shift: \"I don't know\"     Goal status: NA    D: Pt woke up and completed morning vitals and took medication. She ate breakfast and lunch. Patient presents with  depressed mood and also states she is feeling \"depressed\" today.   Patient is organized and logical in conversation.   Patient presents well-groomed. Patient observed attending groups.   Patient continues to be medication compliant   Patient denies SI/SIB/HI.   She anxiety rated 5/10  and  depression rated 5/10     A: Provided active listening, emotional encouragement and goal setting, safety planning safety checks q 15min, and medication administration.    R: Patient was behaviorally appropriate during this shift.  PRN hydroxyzine given for anxiety at 1408. Did not require any restraints or seclusion.     "

## 2019-06-07 NOTE — PROGRESS NOTES
06/06/19 2100   General Information   Art Directive other (see comments)   AT directive was to draw yourself as a landscape using chosen art media. Goals of directive: to create a personal narrative, emotional expression, to identify personal strengths and goals. Pt was a quiet participant, focused on task for the full duration of group. Pt daisha an ocean with waves and described the waves as a metaphor for her current situation but feels hopeful for her future (sunset) Pts mood was calm.
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
## 2019-06-07 NOTE — PLAN OF CARE
BEHAVIORAL TEAM DISCUSSION    Participants: 4A Provider: Debra Naegele, APRN, CNS; 4A RN's: Alida Pemberton RN ; 4A CTC's: Basil Shabazz (CTC).  Progress: No Change.  Continued Stay Criteria/Rationale: Suicidal ideation   Medical/Physical: Deferred (see medical notes).  Precautions:    Behavioral Orders   Procedures    Code 1 - Restrict to Unit    Routine Programming     As clinically indicated    Status 15     Every 15 minutes.    Suicide precautions     Patients on Suicide Precautions should have a Combination Diet ordered that includes a Diet selection(s) AND a Behavioral Tray selection for Safe Tray - with utensils, or Safe Tray - NO utensils       Plan:The following services will be provided to the patient; psychiatric assessment, medication management, therapeutic milieu, individual and group support, art therapy, and skills/OT groups.   Rationale for change in precautions or plan: N/A

## 2019-06-07 NOTE — PLAN OF CARE
Initial OT Note    Pt attended 2 out of 3 OT groups offered. Pt actively participated in a structured occupational therapy group with a focus on a visual-spatial leisure task. Pt was able to follow 2-step directions of the novel task, and demonstrated strategic planning and problem solving throughout task. Pt remained focused for full duration of group. Pt actively participated in occupational therapy clinic. Pt was able to ask for assistance as needed, and independently initiate self-selected task. Pt demonstrated good focus, planning, and problem solving. Pt appeared comfortable interacting with peers and brightened upon social interaction. Pleasant and cooperative. Will continue to assess. Pt will be given self-assessment form, and OT staff will explain the purpose of including them in their treatment plan and offer options for meeting their needs.
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
## 2019-06-07 NOTE — PLAN OF CARE
"  Problem: Suicidal Behavior  Goal: Suicidal Behavior is Absent or Managed  Outcome: No Change   23 year old female admitted to 4AW from the emergency department.  Pt was admitted for SI with a plan to overdose on Meth.  Pt is voluntary.  Pt and RN reviewed allergies and PTA medications.  All PTA medications reordered including comfort medications and routine labs.  Pt's utox and hcg was negative.  Pt has a hx of Depression, Anxiety and Drug Induced Psychosis.  Pt reports worsening depression \"over the past couple of months\".  Per pt, stressors are mental health instability, homelessness, ingesting cough syrup and daily smoking of THC.  Pt states she is at [facility] for 21/2 months but does not like it there.   Pt is open to medication changes and other outpatient services including  a new treatment center and access to housing services.  Upon arrival, pt was calm and visible in the milieu.  Pt was compliant during the admission process.  Vitals stable.   Status 15 initiated per unit policy.  Pt contracted for safety but did endorse SI thoughts.  Pt and staff reviewed unit policies. Verbalized understanding.  Pt was also given  Tour of the unit.  Prn Hydroxyzine 50 mg given at HS per request for anxiety. Will continue to monitor and offer support as needed.  "

## 2019-06-08 PROCEDURE — 12400001 ZZH R&B MH UMMC

## 2019-06-08 PROCEDURE — 25000132 ZZH RX MED GY IP 250 OP 250 PS 637: Performed by: CLINICAL NURSE SPECIALIST

## 2019-06-08 RX ADMIN — FLUTICASONE PROPIONATE 2 SPRAY: 50 SPRAY, METERED NASAL at 08:52

## 2019-06-08 RX ADMIN — HYDROXYZINE HYDROCHLORIDE 25 MG: 25 TABLET ORAL at 08:52

## 2019-06-08 RX ADMIN — CETIRIZINE HYDROCHLORIDE 10 MG: 10 TABLET, FILM COATED ORAL at 08:50

## 2019-06-08 RX ADMIN — PROPRANOLOL HYDROCHLORIDE 20 MG: 10 TABLET ORAL at 08:51

## 2019-06-08 RX ADMIN — HYDROXYZINE HYDROCHLORIDE 50 MG: 25 TABLET ORAL at 14:40

## 2019-06-08 RX ADMIN — HYDROXYZINE HYDROCHLORIDE 50 MG: 25 TABLET ORAL at 20:20

## 2019-06-08 RX ADMIN — BUPROPION HYDROCHLORIDE 300 MG: 150 TABLET, FILM COATED, EXTENDED RELEASE ORAL at 08:50

## 2019-06-08 RX ADMIN — PROPRANOLOL HYDROCHLORIDE 20 MG: 10 TABLET ORAL at 20:20

## 2019-06-08 ASSESSMENT — ACTIVITIES OF DAILY LIVING (ADL)
HYGIENE/GROOMING: INDEPENDENT
ORAL_HYGIENE: INDEPENDENT
DRESS: SCRUBS (BEHAVIORAL HEALTH)
DRESS: INDEPENDENT
ORAL_HYGIENE: INDEPENDENT
HYGIENE/GROOMING: INDEPENDENT

## 2019-06-08 NOTE — PLAN OF CARE
Pt sleeping much of the shift. Slept through supper but ate a peanut butter sandwich later. Pt did not attend group. Awake and in lounge around 21:00 for 45 minutes. Denies being suicidal. Rates anxiety at a 7 and depression at a 5. Hydroxyzine was given and inderal started.

## 2019-06-08 NOTE — PROGRESS NOTES
"   06/08/19 1200 Behavioral Health   Hallucinations denies / not responding to hallucinations   Thinking intact   Orientation person: oriented;place: oriented   Memory baseline memory   Insight poor   Judgement impaired   Eye Contact out of corner of eyes;into space   Affect blunted, flat   Mood mood is calm   Physical Appearance/Attire attire appropriate to age and situation   Hygiene well groomed   Suicidality other (see comments)  (denies)   1. Wish to be Dead No   2. Non-Specific Active Suicidal Thoughts  No   Self Injury other (see comment)  (denies)   Activity withdrawn;isolative   Speech clear;coherent   Medication Sensitivity no stated side effects;no observed side effects   Psychomotor / Gait balanced;steady   Activities of Daily Living   Hygiene/Grooming independent   Oral Hygiene independent   Dress scrubs (behavioral health)   Room Organization independent     Pt was calm, approachable, and cooperative. Pt appeared withdrawn and was isolative throughout the shift. Pt was isolative to room - sleeping in bed. Pt reported sleeping to \"pass the time\", Pt wishes she was not here. Pt asked about discharge plan and when she would learn more - informed her of the process. Pt reported her depression has decreased with in the last couple days, she reported her depression at a 4/10 today. Pt denied SI and SIB. Pt denied hallucinations/psychotic symptoms.   "

## 2019-06-09 PROCEDURE — 25000132 ZZH RX MED GY IP 250 OP 250 PS 637: Performed by: CLINICAL NURSE SPECIALIST

## 2019-06-09 PROCEDURE — 12400001 ZZH R&B MH UMMC

## 2019-06-09 RX ADMIN — PROPRANOLOL HYDROCHLORIDE 20 MG: 10 TABLET ORAL at 20:34

## 2019-06-09 RX ADMIN — PROPRANOLOL HYDROCHLORIDE 20 MG: 10 TABLET ORAL at 09:35

## 2019-06-09 RX ADMIN — FLUTICASONE PROPIONATE 2 SPRAY: 50 SPRAY, METERED NASAL at 12:49

## 2019-06-09 RX ADMIN — HYDROXYZINE HYDROCHLORIDE 50 MG: 25 TABLET ORAL at 20:34

## 2019-06-09 RX ADMIN — BUPROPION HYDROCHLORIDE 300 MG: 150 TABLET, FILM COATED, EXTENDED RELEASE ORAL at 09:34

## 2019-06-09 RX ADMIN — CETIRIZINE HYDROCHLORIDE 10 MG: 10 TABLET, FILM COATED ORAL at 09:35

## 2019-06-09 RX ADMIN — HYDROXYZINE HYDROCHLORIDE 50 MG: 25 TABLET ORAL at 09:34

## 2019-06-09 ASSESSMENT — MIFFLIN-ST. JEOR: SCORE: 1886.26

## 2019-06-09 ASSESSMENT — ACTIVITIES OF DAILY LIVING (ADL)
LAUNDRY: UNABLE TO COMPLETE
ORAL_HYGIENE: INDEPENDENT
HYGIENE/GROOMING: INDEPENDENT
DRESS: INDEPENDENT
HYGIENE/GROOMING: INDEPENDENT

## 2019-06-09 NOTE — PLAN OF CARE
"Patient up for breakfast, vital signs and medication.  Then returned to bed.  Reluctantly agreed to interview.    States she is \"OK\".  Affect is flat, poor eye contact.  Denies SI/SIB.  Denies hallucinations.  Thinking is linear and organized.  Denies depression, anxiety.  (Although appears quite sad).  Plans to return to [facility]--which she describes as \"hard\".  Isolative and withdrawn.    Denies concerns regarding sleep, appetite, medication side effects.  No aggressive behaviors.  Hoping for discharge soon.    "

## 2019-06-09 NOTE — PROGRESS NOTES
Patient reports that she feels happy, denies suicidal ideations, hallucinations, depression, anxiety, and rates her mood at seven out of ten. She also grades her sleep and appetite as good. Pt stated that she is not suppose to be in the hospital, and would like to go home. She shares that she will like to talk to her Provider on Monday next week. Overall, patient appears calm, pleasant, isolative, displays blunted affect, and accepts directions.     06/08/19 2240   Behavioral Health   Hallucinations denies / not responding to hallucinations   Thinking intact   Orientation person: oriented;place: oriented   Memory baseline memory   Insight admits / accepts   Judgement   (Poor)   Eye Contact at floor   Affect blunted, flat   Mood mood is calm   Physical Appearance/Attire appears stated age;attire appropriate to age and situation   Hygiene well groomed   Suicidality other (see comments)  (Denies)   1. Wish to be Dead No   2. Non-Specific Active Suicidal Thoughts  No   Self Injury other (see comment)  (Denies)   Elopement   (No concern)   Activity isolative   Speech clear;coherent   Medication Sensitivity no stated side effects;no observed side effects   Psychomotor / Gait balanced;steady   Coping/Psychosocial   Verbalized Emotional State acceptance;happiness   Safety   Suicidality Status 15   Assault status 15   Elopement status 15   Activities of Daily Living   Hygiene/Grooming independent   Oral Hygiene independent   Dress independent   Room Organization independent   Activity   Activity Assistance Provided independent

## 2019-06-10 VITALS
SYSTOLIC BLOOD PRESSURE: 115 MMHG | OXYGEN SATURATION: 97 % | DIASTOLIC BLOOD PRESSURE: 64 MMHG | TEMPERATURE: 98.7 F | WEIGHT: 240 LBS | HEIGHT: 67 IN | BODY MASS INDEX: 37.67 KG/M2 | HEART RATE: 76 BPM | RESPIRATION RATE: 16 BRPM

## 2019-06-10 PROCEDURE — G0177 OPPS/PHP; TRAIN & EDUC SERV: HCPCS

## 2019-06-10 PROCEDURE — 99239 HOSP IP/OBS DSCHRG MGMT >30: CPT | Performed by: CLINICAL NURSE SPECIALIST

## 2019-06-10 PROCEDURE — 25000132 ZZH RX MED GY IP 250 OP 250 PS 637: Performed by: CLINICAL NURSE SPECIALIST

## 2019-06-10 RX ORDER — PROPRANOLOL HYDROCHLORIDE 20 MG/1
20 TABLET ORAL 2 TIMES DAILY
COMMUNITY
End: 2020-12-16

## 2019-06-10 RX ADMIN — HYDROXYZINE HYDROCHLORIDE 50 MG: 25 TABLET ORAL at 09:22

## 2019-06-10 RX ADMIN — FLUTICASONE PROPIONATE 2 SPRAY: 50 SPRAY, METERED NASAL at 09:22

## 2019-06-10 RX ADMIN — PROPRANOLOL HYDROCHLORIDE 20 MG: 10 TABLET ORAL at 09:21

## 2019-06-10 RX ADMIN — BUPROPION HYDROCHLORIDE 300 MG: 150 TABLET, FILM COATED, EXTENDED RELEASE ORAL at 09:19

## 2019-06-10 RX ADMIN — CETIRIZINE HYDROCHLORIDE 10 MG: 10 TABLET, FILM COATED ORAL at 09:20

## 2019-06-10 NOTE — PROGRESS NOTES
Patient acknowledged understanding of discharge instructions and follow- up.  Discharged to [facility] with all belongings.

## 2019-06-10 NOTE — DISCHARGE INSTRUCTIONS
Behavioral Discharge Planning and Instructions      Summary: You were admitted on 6/6/2019 to [address] due to Suicidal Ideation. You were treated by Debra Naegele, APRN, CNS and discharged on 06/10/2019 to [facility]    Principal Diagnosis:   Major depressive disorder, recurrent, severe, without psychosis.   General anxiety disorder.         Health Care Follow-up Appointments:   Residential Treatment  Date: 06/10/2019  Time: 1:00pm      Provider: Janine Vargas   Address: [address] [place], [place] [address] [address]  Phone:[phone]       Attend all scheduled appointments with your outpatient providers. Call at least 24 hours in advance if you need to reschedule an appointment to ensure continued access to your outpatient providers.   Major Treatments, Procedures and Findings: You were provided with: a psychiatric assessment, assessed for medical stability, medication evaluation and/or management, group therapy, art therapy, milieu management, medical interventions and skills/OT groups.    Symptoms to Report: If you experience any of the following symptoms please report them right away to your provider or to family/friends; feeling more aggressive, increased confusion, losing more sleep, mood getting worse or thoughts of suicide.    Early warning signs can include: Early warning signs that could signal a potential relapse could include but not limited to the following; increased depression or anxiety sleep disturbances increased thoughts or behaviors of suicide or self-harm increased unusual thinking, such as paranoia or hearing voices.     Safety and Wellness:  Take all medicines as directed.  Make no changes unless your doctor suggests them. Follow treatment recommendations.  Refrain from alcohol and non-prescribed drugs. If there is a concern for safety, call 911.    Resources: Mental health crisis response for your [facility] is offered 24 hours a day, 7 days  "a week. A trained counselor will assess your current situation, offer support and counseling and connect you with local resources. Please call  [facility] Crisis (COPE) Response - Adult 601 282-3314    Crisis Intervention: 579.950.5216 or 228-064-3602 (TTY: 575.636.4630).  Call anytime for help.  National [place] on Mental Illness (www.[place].angelita.org): 715.146.1958 or 615-357-8565.  Suicide Awareness Voices of Education (SAVE) (www.save.org): 598-871-HVFJ (5592)  National Suicide Prevention Line (www.mentalhealthmn.org): 155-240-INUY (9648)  Mental Health Consumer/Survivor Network of [place] (www.mhcsn.net): 232.854.2516 or 910-557-3392  Mental Health Association of [place] (www.mentalhealth.org): 737.178.1687 or 146-038-6721  Self- Management and Recovery Training., SMART-- Toll free: 177.288.8516  www.IRIS-RFID.Pebble  Text 4 Life: txt \"LIFE\" to 68514 for immediate support and crisis intervention  Crisis text line: Text \"[place]\" to 814229. Free, confidential, 24/7.    The treatment team has appreciated the opportunity to work with you. Monica, please take care and make your recovery a daily recovery. If you have any questions or concerns our unit number is [phone].  You will be receiving a follow-up phone call within the next three days from a representative from behavioral health.  You have identified the best phone number to reach you as [phone] (home)       "

## 2019-06-10 NOTE — DISCHARGE SUMMARY
"Psychiatric Discharge Summary    Monica Morales MRN# [id number]   Age: 23 year old YOB: 1995     Date of Admission:  6/6/2019  Date of Discharge:  6/10/2019  Admitting Physician:  Abdulaziz Engle MD  Discharge Physician:  Debra A. Naegele, APRN CNS (Contact: [phone])         Event Leading to Hospitalization:   Monica Morales is a 23-year-old single -American female presenting with increased depression and suicidal ideation.  The patient reports having a plan of overdosing on meth.  The patient reports she told her plan to her staff at [facility] and she was brought to the emergency room.  The patient reports she has been having passive suicidal ideation for the past month.  The patient states that she has been at [facility] for 2-1/2 months.  She is at [facility] to manage her substance use of cough syrup and marijuana.  The patient reports in the beginning when she was at [facility], she was on Zyprexa for psychosis.  She was taken off Zyprexa and put on Wellbutrin and titrated to 300 mg.  The patient is obtaining her Wellbutrin through [email or web address].  The patient reports that her depression and anxiety are \"out of control.\"  She wants medication adjustment and to return back to [facility].        See Admission note by Naegele, Debra Ann, APRN CNS on 6/7/2019 for additional details.          DIagnoses:   1.  Major depressive disorder, recurrent, severe, without psychosis.   2.  General anxiety disorder.          Labs:     Results for orders placed or performed during the hospital encounter of 06/06/19   Drug abuse screen 6 urine (chem dep) ([facility])   Result Value Ref Range    Amphetamine Qual Urine Negative NEG^Negative    Barbiturates Qual Urine Negative NEG^Negative    Benzodiazepine Qual Urine Negative NEG^Negative    Cannabinoids Qual Urine Negative NEG^Negative    Cocaine Qual Urine Negative NEG^Negative    Ethanol Qual Urine Negative NEG^Negative    Opiates " Qualitative Urine Negative NEG^Negative   HCG qualitative urine   Result Value Ref Range    HCG Qual Urine Negative NEG^Negative   CBC with platelets differential   Result Value Ref Range    WBC 7.6 4.0 - 11.0 10e9/L    RBC Count 4.46 3.8 - 5.2 10e12/L    Hemoglobin 13.3 11.7 - 15.7 g/dL    Hematocrit 40.1 35.0 - 47.0 %    MCV 90 78 - 100 fl    MCH 29.8 26.5 - 33.0 pg    MCHC 33.2 31.5 - 36.5 g/dL    RDW 11.8 10.0 - 15.0 %    Platelet Count 230 150 - 450 10e9/L    Diff Method Automated Method     % Neutrophils 69.7 %    % Lymphocytes 23.9 %    % Monocytes 4.6 %    % Eosinophils 1.6 %    % Basophils 0.1 %    % Immature Granulocytes 0.1 %    Nucleated RBCs 0 0 /100    Absolute Neutrophil 5.3 1.6 - 8.3 10e9/L    Absolute Lymphocytes 1.8 0.8 - 5.3 10e9/L    Absolute Monocytes 0.4 0.0 - 1.3 10e9/L    Absolute Eosinophils 0.1 0.0 - 0.7 10e9/L    Absolute Basophils 0.0 0.0 - 0.2 10e9/L    Abs Immature Granulocytes 0.0 0 - 0.4 10e9/L    Absolute Nucleated RBC 0.0    Comprehensive metabolic panel   Result Value Ref Range    Sodium 140 133 - 144 mmol/L    Potassium 4.1 3.4 - 5.3 mmol/L    Chloride 108 94 - 109 mmol/L    Carbon Dioxide 25 20 - 32 mmol/L    Anion Gap 7 3 - 14 mmol/L    Glucose 88 70 - 99 mg/dL    Urea Nitrogen 8 7 - 30 mg/dL    Creatinine 0.83 0.52 - 1.04 mg/dL    GFR Estimate >90 >60 mL/min/[1.73_m2]    GFR Estimate If Black >90 >60 mL/min/[1.73_m2]    Calcium 8.9 8.5 - 10.1 mg/dL    Bilirubin Total 0.6 0.2 - 1.3 mg/dL    Albumin 3.5 3.4 - 5.0 g/dL    Protein Total 6.8 6.8 - 8.8 g/dL    Alkaline Phosphatase 114 40 - 150 U/L    ALT 12 0 - 50 U/L    AST 13 0 - 45 U/L   Lipid panel   Result Value Ref Range    Cholesterol 159 <200 mg/dL    Triglycerides 137 <150 mg/dL    HDL Cholesterol 51 >49 mg/dL    LDL Cholesterol Calculated 81 <100 mg/dL    Non HDL Cholesterol 108 <130 mg/dL   TSH with free T4 reflex and/or T3 as indicated   Result Value Ref Range    TSH 1.18 0.40 - 4.00 mU/L            Consults:   No  consultations were requested during this admission         Hospital Course:   Monica Morales was admitted to [facility] with attending Abdulaziz Engle MD as a voluntary patient. The patient was placed under status 15 (15 minute checks) to ensure patient safety.     Jesus was admitted from [facility] due to having suicidal thoughts of wanting to overdose on methamphetamine. Patient had been treated with Wellbutrin 150 mg. It was increased to 300 mg the day before her admission. Continued Wellbutrin at 300 mg. Patient reported that her anxiety was driving her suicidal thinking. She was started on propranolol 20 mg BID which she felt was helpful. Patient used Zyrtec and Flonase for allegies. Patient's mood gradually improved.Discussed risks, benfits and side effects of medication with patient.     Patient was educated on the detrimental effects of mood altering substances on her ental health. Recommendation was to abstain from all illicit substances.     Reviewed admission labs which were unremarkable.     Monica Morales did participate in groups and was visible in the milieu. The patient's symptoms of suicidal ideation improved.  Patient's mood improved and she denied suicidal ideation. Patient has protective factors of seeking out treatment and returning to [facility] to finish her CD treatment.     Patient no longer meets criteria for hospital level of care. She is at moderate risk of relapse due to her psychiatric and chemical health history.    Monica Morales was released to [facility].. At the time of discharge Monica Moralse was determined to not be a danger to herself or others.          Discharge Medications:     Current Discharge Medication List      CONTINUE these medications which have NOT CHANGED    Details   buPROPion (WELLBUTRIN XL) 150 MG 24 hr tablet Take 2 tablets (300 mg) by mouth every morning  Qty: 60 tablet, Refills: 0    Associated Diagnoses: Recurrent major  depressive disorder, in partial remission (H)      cetirizine (ZYRTEC) 10 MG tablet Take 1 tablet (10 mg) by mouth daily  Qty: 30 tablet, Refills: 3    Associated Diagnoses: Seasonal allergic rhinitis due to pollen      fluticasone (FLONASE) 50 MCG/ACT nasal spray Spray 2 sprays into both nostrils daily  Qty: 16 g, Refills: 3    Associated Diagnoses: Seasonal allergic rhinitis due to pollen      ibuprofen (ADVIL/MOTRIN) 400 MG tablet Take 1 tablet (400 mg) by mouth every 6 hours as needed for moderate pain  Qty: 100 tablet, Refills: 1    Associated Diagnoses: Healthcare maintenance      propranolol (INDERAL) 20 MG tablet Take 20 mg by mouth 2 times daily                  Psychiatric Examination:   Appearance:  awake, alert and adequately groomed  Attitude:  cooperative  Eye Contact:  good  Mood:  better  Affect:  appropriate and in normal range  Speech:  clear, coherent  Psychomotor Behavior:  no evidence of tardive dyskinesia, dystonia, or tics  Thought Process:  logical, linear and goal oriented  Associations:  no loose associations  Thought Content:  no evidence of suicidal ideation or homicidal ideation  Insight:  fair  Judgment:  fair  Oriented to:  time, person, and place  Attention Span and Concentration:  intact  Recent and Remote Memory:  intact  Language: Able to name objects, Able to repeat phrases and Able to read and write  Fund of Knowledge: appropriate  Muscle Strength and Tone: normal  Gait and Station: Normal         Discharge Plan:   Behavioral Discharge Planning and Instructions        Summary: You were admitted on 6/6/2019 to [address] due to Suicidal Ideation. You were treated by Debra Naegele, APRN, CNS and [facility]10/[facility]     Principal Diagnosis:   Major depressive disorder, recurrent, severe, without psychosis.   General anxiety disorder.            Health Care Follow-up Appointments:   Residential Treatment  Date:  06/10/2019  Time: 1:00pm      Provider: Janine Vargas   Address: [address] [place] [address]  Phone:[phone]         Attend all scheduled appointments with your outpatient providers. Call at least 24 hours in advance if you need to reschedule an appointment to ensure continued access to your outpatient providers.   Major Treatments, Procedures and Findings: You were provided with: a psychiatric assessment, assessed for medical stability, medication evaluation and/or management, group therapy, art therapy, milieu management, medical interventions and skills/OT groups.     Symptoms to Report: If you experience any of the following symptoms please report them right away to your provider or to family/friends; feeling more aggressive, increased confusion, losing more sleep, mood getting worse or thoughts of suicide.     Early warning signs can include: Early warning signs that could signal a potential relapse could include but not limited to the following; increased depression or anxiety sleep disturbances increased thoughts or behaviors of suicide or self-harm increased unusual thinking, such as paranoia or hearing voices.      Safety and Wellness:  Take all medicines as directed.  Make no changes unless your doctor suggests them. Follow treatment recommendations.  Refrain from alcohol and non-prescribed drugs. If there is a concern for safety, call 911.     Resources: Mental health crisis response for your [facility] is offered 24 hours a day, 7 days a week. A trained counselor will assess your current situation, offer support and counseling and connect you with local resources. Please call  [facility] Crisis (COPE) Response - Adult [phone]     Crisis Intervention: 888.296.7038 or 608-181-7191 (TTY: 430.645.8530).  Call anytime for help.  National [place] on Mental Illness (www.[place].angelita.org): 662.982.5458 or 282-066-0945.  Suicide Awareness Voices of Education (SAVE) (www.save.org):  "888-511-SAVE (7283)  National Suicide Prevention Line (www.mentalhealthmn.org): 938-635-GPPG (5957)  Mental Health Consumer/Survivor Network of [place] (www.mhcsn.net): 499.701.6528 or 396-204-3711  Mental Health Association of [place] (www.mentalhealth.org): 517.596.9690 or 201-403-9638  Self- Management and Recovery Training., SMART-- Toll free: 423.392.4561  [employer]  Text 4 Life: txt \"LIFE\" to 65790 for immediate support and crisis intervention  Crisis text line: Text \"[place]\" to 689520. Free, confidential, 24/7.     The treatment team has appreciated the opportunity to work with you. Monica, please take care and make your recovery a daily recovery. If you have any questions or concerns our unit number is [phone].  You will be receiving a follow-up phone call within the next three days from a representative from behavioral health.  You have identified the best phone number to reach you as [phone] (home)      Attestation:  The patient has been seen and evaluated by me,  Debra A. Naegele, APRN CNS on 6/10/2019  Discharge summary time > 30 minutes   "

## 2019-06-10 NOTE — PROGRESS NOTES
Patient had a calm shift overall.  She was quiet and reserved in the milieu.  She did not participate in groups, but did watch the movie and ate dinner with peers.  She did not take a shower or have visitors this evening.  She stated she does not like to participate in groups.  She currently denies SI/SIB.  She went to bed before the last therapeutic group.

## 2019-06-10 NOTE — PLAN OF CARE
Patient denies mental health concerns.  Denies SI/SIB or thoughts to hurt others. Denies hallucinations.   Affect remains flat, appears depressed (although she states she is not depressed).  Does acknowledge some anxiety.  Attending unit programming this morning.  Discharging this afternoon.

## 2019-06-10 NOTE — PROGRESS NOTES
Writer scheduled taxi to transport pt at 1pm per [facility]. [employer] will transport her. The confirmation number is [id number] their phone number is [phone].

## 2019-06-11 ENCOUNTER — PATIENT OUTREACH (OUTPATIENT)
Dept: FAMILY MEDICINE | Facility: CLINIC | Age: 24
End: 2019-06-11

## 2019-06-11 NOTE — PROGRESS NOTES
Attempted to reach patient to follow up after recent hospitalization, spoke with schedule at [facility] to schedule and requested pt call back to see how doing since discharge    Patient was admitted to [facility] for SI, discharged 6/10/19.    FD: If patient calls back, please schedule hospital follow up visit, then transfer to RN.    Nata Atkins RN

## 2019-06-12 ASSESSMENT — ENCOUNTER SYMPTOMS
CONFUSION: 0
APPETITE CHANGE: 0
SLEEP DISTURBANCE: 0
NERVOUS/ANXIOUS: 1
DIZZINESS: 0
DECREASED CONCENTRATION: 0
ACTIVITY CHANGE: 0
HEADACHES: 0

## 2019-06-14 ENCOUNTER — OFFICE VISIT (OUTPATIENT)
Dept: FAMILY MEDICINE | Facility: CLINIC | Age: 24
End: 2019-06-14
Payer: COMMERCIAL

## 2019-06-14 VITALS
RESPIRATION RATE: 16 BRPM | DIASTOLIC BLOOD PRESSURE: 74 MMHG | HEART RATE: 64 BPM | TEMPERATURE: 97.5 F | SYSTOLIC BLOOD PRESSURE: 110 MMHG | HEIGHT: 67 IN | WEIGHT: 238.6 LBS | BODY MASS INDEX: 37.45 KG/M2 | OXYGEN SATURATION: 98 %

## 2019-06-14 DIAGNOSIS — F32.A ANXIETY AND DEPRESSION: Primary | ICD-10-CM

## 2019-06-14 DIAGNOSIS — F41.9 ANXIETY AND DEPRESSION: Primary | ICD-10-CM

## 2019-06-14 RX ORDER — MIRTAZAPINE 15 MG/1
15 TABLET, FILM COATED ORAL AT BEDTIME
COMMUNITY
End: 2020-12-16

## 2019-06-14 RX ORDER — HYDROXYZINE HYDROCHLORIDE 50 MG/1
50 TABLET, FILM COATED ORAL PRN
COMMUNITY
End: 2020-12-16

## 2019-06-14 ASSESSMENT — ANXIETY QUESTIONNAIRES
6. BECOMING EASILY ANNOYED OR IRRITABLE: NOT AT ALL
2. NOT BEING ABLE TO STOP OR CONTROL WORRYING: SEVERAL DAYS
7. FEELING AFRAID AS IF SOMETHING AWFUL MIGHT HAPPEN: NOT AT ALL
1. FEELING NERVOUS, ANXIOUS, OR ON EDGE: NOT AT ALL
3. WORRYING TOO MUCH ABOUT DIFFERENT THINGS: SEVERAL DAYS
5. BEING SO RESTLESS THAT IT IS HARD TO SIT STILL: NOT AT ALL
GAD7 TOTAL SCORE: 2

## 2019-06-14 ASSESSMENT — ENCOUNTER SYMPTOMS
VOMITING: 0
FEVER: 0
LIGHT-HEADEDNESS: 0
DYSPHORIC MOOD: 0
ABDOMINAL PAIN: 0
NAUSEA: 0
CONSTIPATION: 0
COUGH: 0
NERVOUS/ANXIOUS: 0
DIARRHEA: 0
RHINORRHEA: 0
DYSURIA: 0

## 2019-06-14 ASSESSMENT — MIFFLIN-ST. JEOR: SCORE: 1886.26

## 2019-06-14 ASSESSMENT — PATIENT HEALTH QUESTIONNAIRE - PHQ9
SUM OF ALL RESPONSES TO PHQ QUESTIONS 1-9: 1
5. POOR APPETITE OR OVEREATING: NOT AT ALL

## 2019-06-14 NOTE — PROGRESS NOTES
Preceptor Attestation:   Patient seen, evaluated and discussed with the resident. I have verified the content of the note, which accurately reflects my assessment of the patient and the plan of care.   Supervising Physician:  Elba Bautista MD

## 2019-06-14 NOTE — PROGRESS NOTES
"       HPI       Monica Morales is a 23 year old female with a history of substance use disorder (marijuana, cough syrup), depression, and anxiety, who presents for   Chief Complaint   Patient presents with     Hospital F/U     Monica was recently hospitalized at the [facility] for suicidal ideation. She had a plan of overdosing on meth (she has previously used meth, but is not currently using). She believes her suicidal ideation and depressed/anxious mood prior to hospitalization were triggered by her \"thinking about the past and thinking about her future.\" She greatly improved in the hospital and currently denies any suicidal ideation. She states she is having a \"good day\" and had a good mood yesterday, as well. She is currently in recovery for marijuana and cough syrup use at [facility]. She has been there for about 2-3 months and really likes it. She finds the other residents to be really friendly, and she gets along well with them and the staff. She has therapy there for both substance use and mood symptoms, which she feels is going well. Yesterday she established care with a Mental Health APRLANDON Mars and has follow up scheduled with her in about 3 weeks. She also feels like she can talk to her mom when she is in a bad mood or to the [facility] staff. She feels well-supported.    Problem, Medication and Allergy Lists were reviewed and updated if needed..    Patient is an established patient of this clinic..         Review of Systems:   Review of Systems   Constitutional: Negative for fever.   HENT: Negative for rhinorrhea.    Respiratory: Negative for cough.    Cardiovascular: Negative for chest pain.   Gastrointestinal: Negative for abdominal pain, constipation, diarrhea, nausea and vomiting.   Genitourinary: Negative for dysuria.   Neurological: Negative for light-headedness.   Psychiatric/Behavioral: Negative for dysphoric mood, self-injury and suicidal " "ideas. The patient is not nervous/anxious.           Physical Exam:     Vitals:    06/14/19 1318   BP: 110/74   BP Location: Right arm   Patient Position: Sitting   Cuff Size: Adult Large   Pulse: 64   Resp: 16   Temp: 97.5  F (36.4  C)   TempSrc: Oral   SpO2: 98%   Weight: 108.2 kg (238 lb 9.6 oz)   Height: 1.712 m (5' 7.4\")     Body mass index is 36.93 kg/m .  Vital signs normal except elevated BMI.     Physical Exam   General: well-appearing, no acute distress  HEENT: normocephalic, atraumatic  Respiratory: lungs clear to auscultation bilaterally, no wheezes, rhonchi, or rales  Cardiac: regular rate and rhythm, S1/S2 heard, no murmurs, rubs, or gallops  Neurologic: cranial nerves grossly intact, moves all extremities equally    Psychiatric:  Alertness:  alert   Appearance:  well groomed  Behavior/Demeanor:  cooperative and pleasant, with fair  eye contact.  Speech:  increased latency of response  Psychomotor:  unremarkable    Mood:  Subdued   Affect:  blunted  - stated mood was really good but appeared subdued  Thought Process/Associations: response delay   Thought Content: devoid of  suicidal ideation.   Perception: devoid of  auditory hallucinations and visual hallucinations  Insight:  fair.  Judgment: good.  Attention/Concentration:  Normal  Language:  Intact  Fund of Knowledge:  Average.    Memory:  Short-term memory intact and Long-term memory intact.      These cognitive functions grossly appear as described, but were not formally tested.    Results:   No testing ordered today    Assessment and Plan     1. Anxiety and depression  Patient just established care with RODNEY Mayer yesterday and believes it is going well. She has a follow up appointment scheduled in about 3 weeks. Patient reports greatly improved mood since hospitalization and no suicidal ideation at this time. She sees a therapist at [facility] for substance abuse and mood symptoms, which she feels is going well. She has " support from family and staff and residents of [facility]. On exam, her affect is blunted and she has mild speech latency. She seems to be greatly improved since prior to hospitalization. No changes made today since she has supports already in place for managing her anxiety and depression, as well as substance use disorder.  - Follow up with APRN as scheduled  - Continue therapy at [facility]  - Continue Wellbutrin, mirtazipine, propranolol, and hydroxyzine as prescribed     There are no discontinued medications.    Options for treatment and follow-up care were reviewed with the patient. Monica Morales  engaged in the decision making process and verbalized understanding of the options discussed and agreed with the final plan.    Candi Bowers, MS4  [employer]    Maria Teresa Carrillo,     I was present with the medical student who participated in the service and in the documentation of this note. I have verified the history and personally performed the physical exam and medical decision making, and have verified the content of the note, which accurately reflects my assessment of the patient and the plan of care.   Maria Teresa Carrillo, DO

## 2019-06-14 NOTE — PATIENT INSTRUCTIONS
Here is the plan from today's visit    1. Anxiety and depression      Please call or return to clinic if your symptoms don't go away.    Follow up plan  Please make a clinic appointment for follow up with me (NAYELY CARRILLO) in one month.      Thank you for coming to [facility] today.  Lab Testing:  **If you had lab testing today and your results are reassuring or normal they will be mailed to you or sent through [employer] within 7 days.   **If the lab tests need quick action we will call you with the results.  The phone number we will call with results is # [phone] (home) . If this is not the best number please call our clinic and change the number.  Medication Refills:  If you need any refills please call your pharmacy and they will contact us.   If you need to  your refill at a new pharmacy, please contact the new pharmacy directly. The new pharmacy will help you get your medications transferred faster.   Scheduling:  If you have any concerns about today's visit or wish to schedule another appointment please call our office during normal business hours [phone] (8-5:00 M-F)  If a referral was made to a [facility] and you don't get a call from central scheduling please call [phone].  If a Mammogram was ordered for you at [facility] call [phone] to schedule or change your appointment.  If you had an XRay/CT/Ultrasound/MRI ordered the number is [phone] to schedule or change your radiology appointment.   Medical Concerns:  If you have urgent medical concerns please call [phone] at any time of the day.    Nayely Carrillo, DO

## 2019-06-15 ASSESSMENT — ANXIETY QUESTIONNAIRES: GAD7 TOTAL SCORE: 2

## 2019-08-19 DIAGNOSIS — J30.1 SEASONAL ALLERGIC RHINITIS DUE TO POLLEN: ICD-10-CM

## 2019-08-20 RX ORDER — CETIRIZINE HYDROCHLORIDE 10 MG/1
10 TABLET ORAL DAILY
Start: 2019-08-20

## 2019-08-20 NOTE — TELEPHONE ENCOUNTER
"Last Written Prescription Date:  4/16/2019  Last Fill Quantity: 30,  # refills: 3   Last office visit: No previous visit found with prescribing provider:  Ava   Future Office Visit:      Requested Prescriptions   Pending Prescriptions Disp Refills     cetirizine (ZYRTEC) 10 MG tablet [Pharmacy Med Name: CETIRIZINE 10MG TAB] 30 tablet 3     Sig: TAKE 1 TABLET (10 MG) BY MOUTH DAILY       Antihistamines Protocol Passed - 8/19/2019 10:03 AM        Passed - Patient is 3-64 years of age     Apply weight-based dosing for peds patients age 3 - 12 years of age.    Forward request to provider for patients under the age of 3 or over the age of 64.          Passed - Recent (12 mo) or future (30 days) visit within the authorizing provider's specialty     Patient had office visit in the last 12 months or has a visit in the next 30 days with authorizing provider or within the authorizing provider's specialty.  See \"Patient Info\" tab in inbasket, or \"Choose Columns\" in Meds & Orders section of the refill encounter.              Passed - Medication is active on med list          "

## 2019-08-20 NOTE — TELEPHONE ENCOUNTER
No longer [facility] patient.  Establish care with [facility] 5/10/19  Pharmacy informed.  Anel Saunders RN

## 2019-08-23 DIAGNOSIS — J30.1 SEASONAL ALLERGIC RHINITIS DUE TO POLLEN: ICD-10-CM

## 2019-08-24 RX ORDER — CETIRIZINE HYDROCHLORIDE 10 MG/1
10 TABLET ORAL DAILY
Qty: 30 TABLET | Refills: 3 | OUTPATIENT
Start: 2019-08-24

## 2019-09-09 DIAGNOSIS — J30.1 SEASONAL ALLERGIC RHINITIS DUE TO POLLEN: ICD-10-CM

## 2019-09-10 RX ORDER — FLUTICASONE PROPIONATE 50 MCG
2 SPRAY, SUSPENSION (ML) NASAL DAILY
Start: 2019-09-10

## 2019-09-10 NOTE — TELEPHONE ENCOUNTER
"Patient saw Dr. Maria Teresa Carrillo 5/10/19 to establish care.  Pharmacy informed.  Anel Saunders RN        Requested Prescriptions   Pending Prescriptions Disp Refills     fluticasone (FLONASE) 50 MCG/ACT nasal spray [Pharmacy Med Name: FLUTICASONE 50MCG SPR] 16 g 3     Sig: SPRAY 2 SPRAYS INTO BOTH NOSTRILS DAILY       Inhaled Steroids Protocol Passed - 9/9/2019 10:41 AM        Passed - Patient is age 12 or older        Passed - Recent (12 mo) or future (30 days) visit within the authorizing provider's specialty     Patient had office visit in the last 12 months or has a visit in the next 30 days with authorizing provider or within the authorizing provider's specialty.  See \"Patient Info\" tab in inbasket, or \"Choose Columns\" in Meds & Orders section of the refill encounter.              Passed - Medication is active on med list        "

## 2019-09-20 ENCOUNTER — OFFICE VISIT (OUTPATIENT)
Dept: FAMILY MEDICINE | Facility: CLINIC | Age: 24
End: 2019-09-20
Payer: COMMERCIAL

## 2019-09-20 VITALS
SYSTOLIC BLOOD PRESSURE: 106 MMHG | OXYGEN SATURATION: 97 % | DIASTOLIC BLOOD PRESSURE: 72 MMHG | TEMPERATURE: 98.9 F | WEIGHT: 269.4 LBS | HEIGHT: 67 IN | HEART RATE: 77 BPM | BODY MASS INDEX: 42.28 KG/M2

## 2019-09-20 DIAGNOSIS — Z23 ENCOUNTER FOR IMMUNIZATION: ICD-10-CM

## 2019-09-20 DIAGNOSIS — J30.1 SEASONAL ALLERGIC RHINITIS DUE TO POLLEN: Primary | ICD-10-CM

## 2019-09-20 RX ORDER — CETIRIZINE HYDROCHLORIDE 10 MG/1
10 TABLET ORAL DAILY
Qty: 30 TABLET | Refills: 11 | Status: SHIPPED | OUTPATIENT
Start: 2019-09-20 | End: 2021-02-26

## 2019-09-20 RX ORDER — FLUTICASONE PROPIONATE 50 MCG
2 SPRAY, SUSPENSION (ML) NASAL DAILY
Qty: 16 G | Refills: 11 | Status: SHIPPED | OUTPATIENT
Start: 2019-09-20 | End: 2020-09-15

## 2019-09-20 ASSESSMENT — MIFFLIN-ST. JEOR: SCORE: 2014.62

## 2019-09-20 NOTE — PROGRESS NOTES
"       HPI       Monica Morales is a 24 year old who presents for   Chief Complaint   Patient presents with     Refill Request     flonase and zyrtec       Worsening allergies: itchy throat, runny nose, and sneezing for 2 weeks. No fever, chills, cough.  Most recent falls allergy medication.    +++++++    Problem, Medication and Allergy Lists were reviewed and updated if needed..    Patient is an established patient of this clinic..         Review of Systems:   Review of Systems  See [facility]       Physical Exam:     Vitals:    09/20/19 1300   BP: 106/72   Pulse: 77   Temp: 98.9  F (37.2  C)   TempSrc: Oral   SpO2: 97%   Weight: 122.2 kg (269 lb 6.4 oz)   Height: 1.702 m (5' 7\")     Body mass index is 42.19 kg/m .  Vitals were reviewed and were normal     Physical Exam   Constitutional: She appears well-developed and well-nourished. No distress.   HENT:   Head: Normocephalic and atraumatic.   Right Ear: Hearing, tympanic membrane, external ear and ear canal normal.   Left Ear: Hearing, tympanic membrane, external ear and ear canal normal.   Nose: Rhinorrhea present.   Mouth/Throat: Oropharynx is clear and moist and mucous membranes are normal.   Eyes: EOM are normal.   Cardiovascular: Normal rate.   Pulmonary/Chest: Effort normal and breath sounds normal. No stridor. No respiratory distress. She has no wheezes. She has no rales.   Skin: Skin is warm and dry. She is not diaphoretic.   Psychiatric: Her speech is normal. Her affect is blunt.         Results:   No testing ordered today    Assessment and Plan        Monica was seen today for refill request.    Diagnoses and all orders for this visit:    Seasonal allergic rhinitis due to pollen - S9chbuoca allergies over the last 2 weeks.  Ran out of allergy medications and needs these refilled.  Is consistent with allergies. No signs of viral or bacterial infection on exam today.  -     cetirizine (ZYRTEC) 10 MG tablet; Take 1 tablet (10 mg) by mouth daily  -     " fluticasone (FLONASE) 50 MCG/ACT nasal spray; Spray 2 sprays into both nostrils daily    Encounter for immunization - Will get second HPV vaccine today and can get third anytime after 11/21/2019.  Will schedule nurse only visit.  -     ADMIN VACCINE, INITIAL  -     HPV9 (Gardasil 9 )         Medications Discontinued During This Encounter   Medication Reason     cetirizine (ZYRTEC) 10 MG tablet Reorder     fluticasone (FLONASE) 50 MCG/ACT nasal spray Reorder       Options for treatment and follow-up care were reviewed with the patient. Monica Morales  engaged in the decision making process and verbalized understanding of the options discussed and agreed with the final plan.    Layla Walls MD  PGY-3

## 2019-09-20 NOTE — PROGRESS NOTES
Preceptor Attestation:   Patient seen, evaluated and discussed with the resident. I have verified the content of the note, which accurately reflects my assessment of the patient and the plan of care.   Supervising Physician:  Elba Bautista MD MD

## 2019-09-20 NOTE — PATIENT INSTRUCTIONS
Here is the plan from today's visit    1. Seasonal allergic rhinitis due to pollen  Refills provided.  - cetirizine (ZYRTEC) 10 MG tablet; Take 1 tablet (10 mg) by mouth daily  Dispense: 30 tablet; Refill: 11  - fluticasone (FLONASE) 50 MCG/ACT nasal spray; Spray 2 sprays into both nostrils daily  Dispense: 16 g; Refill: 11    Can get 3rd HPV vaccine after 11/21/19. Just need to schedule a nurse only visit.    Please call or return to clinic if your symptoms don't go away.    Follow up plan  Please make a clinic appointment for follow up with me (SALLY MERIDA) as needed.    Thank you for coming to [facility] today.  Lab Testing:  **If you had lab testing today and your results are reassuring or normal they will be mailed to you or sent through [employer] within 7 days.   **If the lab tests need quick action we will call you with the results.  The phone number we will call with results is # [phone] (home) . If this is not the best number please call our clinic and change the number.  Medication Refills:  If you need any refills please call your pharmacy and they will contact us.   If you need to  your refill at a new pharmacy, please contact the new pharmacy directly. The new pharmacy will help you get your medications transferred faster.   Scheduling:  If you have any concerns about today's visit or wish to schedule another appointment please call our office during normal business hours [phone] (8-5:00 M-F)  If a referral was made to a [facility] and you don't get a call from central scheduling please call [phone].  If a Mammogram was ordered for you at [facility] call [phone] to schedule or change your appointment.  If you had an XRay/CT/Ultrasound/MRI ordered the number is [phone] to schedule or change your radiology appointment.   Medical Concerns:  If you have urgent medical concerns please call [phone] at any time of the day.    Sally MCKINNEY  MD [facility]

## 2019-11-25 ENCOUNTER — ALLIED HEALTH/NURSE VISIT (OUTPATIENT)
Dept: FAMILY MEDICINE | Facility: CLINIC | Age: 24
End: 2019-11-25
Payer: COMMERCIAL

## 2019-11-25 DIAGNOSIS — Z13.9 SCREENING FOR CONDITION: Primary | ICD-10-CM

## 2020-02-06 ENCOUNTER — OFFICE VISIT (OUTPATIENT)
Dept: FAMILY MEDICINE | Facility: CLINIC | Age: 25
End: 2020-02-06
Payer: COMMERCIAL

## 2020-02-06 VITALS
TEMPERATURE: 98.3 F | OXYGEN SATURATION: 97 % | HEIGHT: 67 IN | HEART RATE: 79 BPM | RESPIRATION RATE: 16 BRPM | DIASTOLIC BLOOD PRESSURE: 64 MMHG | WEIGHT: 272 LBS | SYSTOLIC BLOOD PRESSURE: 95 MMHG | BODY MASS INDEX: 42.69 KG/M2

## 2020-02-06 DIAGNOSIS — B36.0 TINEA VERSICOLOR: Primary | ICD-10-CM

## 2020-02-06 RX ORDER — KETOCONAZOLE 20 MG/ML
SHAMPOO TOPICAL
Qty: 120 ML | Refills: 3 | Status: ON HOLD | OUTPATIENT
Start: 2020-02-06 | End: 2022-07-14

## 2020-02-06 ASSESSMENT — MIFFLIN-ST. JEOR: SCORE: 2026.41

## 2020-02-06 NOTE — PROGRESS NOTES
"       HPI       Monica Morales is a 24 year old  who presents for   Chief Complaint   Patient presents with     Derm Problem     brown peeling skin rash between breasts for 3 weeks       Rash between breasts, there for 3 weeks.  No pain or itching  Never had before  Skin is dry, some peeling  Has not tried any OTC           +++++++      Problem, Medication and Allergy Lists were reviewed and updated if needed..    Patient is an established patient of this clinic..         Review of Systems:   Review of Systems  ROS: 7 point ROS neg other than the symptoms noted above in the HPI.       Physical Exam:     Vitals:    02/06/20 1402   BP: 95/64   Pulse: 79   Resp: 16   Temp: 98.3  F (36.8  C)   TempSrc: Oral   SpO2: 97%   Weight: 123.4 kg (272 lb)   Height: 1.702 m (5' 7\")     Body mass index is 42.6 kg/m .  Vitals were reviewed and were normal     Physical Exam  HENT:      Head: Normocephalic.   Eyes:      Conjunctiva/sclera: Conjunctivae normal.   Cardiovascular:      Rate and Rhythm: Normal rate.   Pulmonary:      Effort: Pulmonary effort is normal. No respiratory distress.   Skin:     General: Skin is warm.             Comments: Hyperpigmented macular rash, no erythema or excoriation. Did not flourese with woods lamp   Neurological:      General: No focal deficit present.   Psychiatric:         Mood and Affect: Mood normal.           Results:   No testing ordered today    Assessment and Plan        Monica was seen today for derm problem.    Diagnoses and all orders for this visit:    Tinea versicolor    Discussed with patient good skin hygiene especially in breast cleavage area.  Recommend drying well after showers and may use baby powder area dry if needed.  We will start with ketoconazole to see if this provides any relief to current symptoms.  Patient to follow-up in 2 to 3 weeks if not seeing dramatic improvement.  Differential also includes erythrasma versus intertrigo.  Eczema seems quite unlikely given " location and presentation.        -     ketoconazole (NIZORAL) 2 % external shampoo; Apply to affected area 3 times per week           There are no discontinued medications.    Options for treatment and follow-up care were reviewed with the patient. Monica Morales  engaged in the decision making process and verbalized understanding of the options discussed and agreed with the final plan.    Judah Lovett, DO

## 2020-02-06 NOTE — PATIENT INSTRUCTIONS
Here is the plan from today's visit    1. Tinea versicolor    - ketoconazole (NIZORAL) 2 % external shampoo; Apply to affected area 3 times per week  Dispense: 120 mL; Refill: 3      Please call or return to clinic if your symptoms don't go away.    Follow up plan      Thank you for coming to [facility] today.  Lab Testing:  **If you had lab testing today and your results are reassuring or normal they will be mailed to you or sent through [employer] within 7 days.   **If the lab tests need quick action we will call you with the results.  The phone number we will call with results is # [phone] (home) . If this is not the best number please call our clinic and change the number.  Medication Refills:  If you need any refills please call your pharmacy and they will contact us.   If you need to  your refill at a new pharmacy, please contact the new pharmacy directly. The new pharmacy will help you get your medications transferred faster.   Scheduling:  If you have any concerns about today's visit or wish to schedule another appointment please call our office during normal business hours [phone] (8-5:00 M-F)  If a referral was made to a [facility] and you don't get a call from central scheduling please call [phone].  If a Mammogram was ordered for you at [facility] call [phone] to schedule or change your appointment.  If you had an XRay/CT/Ultrasound/MRI ordered the number is [phone] to schedule or change your radiology appointment.   Medical Concerns:  If you have urgent medical concerns please call [phone] at any time of the day.    Judah Lovett, DO

## 2020-02-06 NOTE — PROGRESS NOTES
Preceptor Attestation:   Patient seen, evaluated and discussed with the resident. I have verified the content of the note, which accurately reflects my assessment of the patient and the plan of care.   Supervising Physician:  Anne Marie Thomas MD

## 2020-05-08 DIAGNOSIS — Z00.00 HEALTHCARE MAINTENANCE: ICD-10-CM

## 2020-05-08 RX ORDER — IBUPROFEN 400 MG/1
400 TABLET, FILM COATED ORAL EVERY 6 HOURS PRN
Qty: 100 TABLET | Refills: 4 | Status: ON HOLD | OUTPATIENT
Start: 2020-05-08 | End: 2022-07-14

## 2020-05-08 NOTE — TELEPHONE ENCOUNTER

## 2020-07-22 ENCOUNTER — TELEPHONE (OUTPATIENT)
Dept: FAMILY MEDICINE | Facility: CLINIC | Age: 25
End: 2020-07-22

## 2020-07-22 NOTE — TELEPHONE ENCOUNTER
RN called pt to schedule pap smear. Pt had abnormal pap in may 2019 and due for 1 year follow up    Please schedule if pt calls back    Nata DARWIN Atkins

## 2020-08-11 ENCOUNTER — OFFICE VISIT (OUTPATIENT)
Dept: FAMILY MEDICINE | Facility: CLINIC | Age: 25
End: 2020-08-11
Payer: COMMERCIAL

## 2020-08-11 VITALS
BODY MASS INDEX: 41.44 KG/M2 | HEIGHT: 67 IN | RESPIRATION RATE: 18 BRPM | DIASTOLIC BLOOD PRESSURE: 79 MMHG | OXYGEN SATURATION: 95 % | SYSTOLIC BLOOD PRESSURE: 114 MMHG | TEMPERATURE: 98.2 F | WEIGHT: 264 LBS | HEART RATE: 83 BPM

## 2020-08-11 DIAGNOSIS — E66.01 MORBID OBESITY (H): Primary | ICD-10-CM

## 2020-08-11 DIAGNOSIS — R87.612 LOW GRADE SQUAMOUS INTRAEPITHELIAL LESION ON CYTOLOGIC SMEAR OF CERVIX (LGSIL): ICD-10-CM

## 2020-08-11 LAB — HBA1C MFR BLD: 5.4 % (ref 4.1–5.7)

## 2020-08-11 RX ORDER — LAMOTRIGINE 25 MG/1
50 TABLET ORAL DAILY
Status: ON HOLD | COMMUNITY
End: 2022-07-14

## 2020-08-11 ASSESSMENT — ANXIETY QUESTIONNAIRES
6. BECOMING EASILY ANNOYED OR IRRITABLE: NOT AT ALL
IF YOU CHECKED OFF ANY PROBLEMS ON THIS QUESTIONNAIRE, HOW DIFFICULT HAVE THESE PROBLEMS MADE IT FOR YOU TO DO YOUR WORK, TAKE CARE OF THINGS AT HOME, OR GET ALONG WITH OTHER PEOPLE: NOT DIFFICULT AT ALL
2. NOT BEING ABLE TO STOP OR CONTROL WORRYING: NOT AT ALL
7. FEELING AFRAID AS IF SOMETHING AWFUL MIGHT HAPPEN: NOT AT ALL
GAD7 TOTAL SCORE: 0
3. WORRYING TOO MUCH ABOUT DIFFERENT THINGS: NOT AT ALL
1. FEELING NERVOUS, ANXIOUS, OR ON EDGE: NOT AT ALL
5. BEING SO RESTLESS THAT IT IS HARD TO SIT STILL: NOT AT ALL

## 2020-08-11 ASSESSMENT — MIFFLIN-ST. JEOR: SCORE: 1977.19

## 2020-08-11 ASSESSMENT — PATIENT HEALTH QUESTIONNAIRE - PHQ9
SUM OF ALL RESPONSES TO PHQ QUESTIONS 1-9: 1
5. POOR APPETITE OR OVEREATING: NOT AT ALL

## 2020-08-11 NOTE — PROGRESS NOTES
Preceptor Attestation:   Patient seen, evaluated and discussed with the resident. I have verified the content of the note, which accurately reflects my assessment of the patient and the plan of care.   Supervising Physician:  Karen Riley MD

## 2020-08-11 NOTE — PATIENT INSTRUCTIONS
Here is the plan from today's visit    1. Obesity (H)  - BARIATRIC ADULT REFERRAL - INTERNAL  - Hemoglobin A1c ([facility])    2. Low grade squamous intraepithelial lesion on cytologic smear of cervix (LGSIL)  - OB/GYN REFERRAL - INTERNAL    Please call or return to clinic if your symptoms don't go away.      Follow up plan  No follow-ups on file.      Thank you for coming to [facility] today.  Lab Testing:  **If you had lab testing today and your results are reassuring or normal they will be mailed to you or sent through [employer] within 7 days.   **If the lab tests need quick action we will call you with the results.  The phone number we will call with results is # [phone] (home) . If this is not the best number please call our clinic and change the number.  Medication Refills:  If you need any refills please call your pharmacy and they will contact us.   If you need to  your refill at a new pharmacy, please contact the new pharmacy directly. The new pharmacy will help you get your medications transferred faster.   Scheduling:  If you have any concerns about today's visit or wish to schedule another appointment please call our office during normal business hours [phone] (8-5:00 M-F)  If a referral was made to a [facility] and you don't get a call from central scheduling please call [phone].  If a Mammogram was ordered for you at [facility] call [phone] to schedule or change your appointment.  If you had an XRay/CT/Ultrasound/MRI ordered the number is [phone] to schedule or change your radiology appointment.   Medical Concerns:  If you have urgent medical concerns please call [phone] at any time of the day.    Delmar Horne MD
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
## 2020-08-11 NOTE — LETTER
August 11, 2020      Monica Murojoshua  [address] [place] [address]        Dear ,    We are writing to inform you of your test results.    Your test results fall within the expected range(s) or remain unchanged from previous results.  Please continue with current treatment plan.    Resulted Orders   Hemoglobin A1c ([facility])   Result Value Ref Range    Hemoglobin A1C 5.4 4.1 - 5.7 %       If you have any questions or concerns, please call the clinic at the number listed above.       Sincerely,        Delmar Horne MD

## 2020-08-12 ASSESSMENT — ANXIETY QUESTIONNAIRES: GAD7 TOTAL SCORE: 0

## 2020-08-19 ENCOUNTER — VIRTUAL VISIT (OUTPATIENT)
Dept: SURGERY | Facility: CLINIC | Age: 25
End: 2020-08-19
Payer: COMMERCIAL

## 2020-08-19 ENCOUNTER — TELEPHONE (OUTPATIENT)
Dept: SURGERY | Facility: CLINIC | Age: 25
End: 2020-08-19

## 2020-08-19 DIAGNOSIS — Z53.9 NO SHOW: Primary | ICD-10-CM

## 2020-08-19 NOTE — TELEPHONE ENCOUNTER
Pt did not show for NP appointment at noon, pt was not on video call for RD appointment at 12:30 PM. Called patient to follow up, left voicemail for patient to reschedule at this time.   To reschedule please call [phone].     Lynn Iverson, MS, RD, LD

## 2020-08-19 NOTE — LETTER
8/19/2020       RE: Monica Morales  [address] [place] [facility] [address]     Dear Colleague,    Thank you for referring your patient, Monica Morales, to the [facility] SURGICAL WE[facility]NAGEMENT at [facility]. Please see a copy of my visit note below.    No show. RODNEY Chandler CNP     Again, thank you for allowing me to participate in the care of your patient.      Sincerely,    Sadia Soares, NP

## 2020-09-15 ENCOUNTER — TELEPHONE (OUTPATIENT)
Dept: FAMILY MEDICINE | Facility: CLINIC | Age: 25
End: 2020-09-15

## 2020-09-15 DIAGNOSIS — J30.1 SEASONAL ALLERGIC RHINITIS DUE TO POLLEN: ICD-10-CM

## 2020-09-15 RX ORDER — FLUTICASONE PROPIONATE 50 MCG
2 SPRAY, SUSPENSION (ML) NASAL DAILY
Qty: 16 G | Refills: 2 | Status: SHIPPED | OUTPATIENT
Start: 2020-09-15 | End: 2021-02-26

## 2020-09-15 NOTE — TELEPHONE ENCOUNTER
Verify that the refill encounter hasn't been started Yes    [facility] phone call message- patient requesting a refill:    Full Medication Name: fluticasone (FLONASE) 50 MCG/ACT nasal spray     Dose: Spray 2 sprays into both nostrils daily - Both Nostrils      Pharmacy confirmed as   [facility] - [address]  [facility] [address]  Phone: [phone] Fax: [phone]  : Yes    Medication tab checked to see if medication has been sent  Yes    Additional Comments: Patient is out of medication.      OK to leave a message on voice mail? Yes    Advised patient refill may take up to 2 business days? Yes    Primary language: English      needed? No    Call taken on September 15, 2020 at 8:17 AM by Nicole Anthony to P [facility] MED REFILL

## 2020-09-15 NOTE — TELEPHONE ENCOUNTER
Last office visit: 08/11/2020  Last refill: 09/20/2019 for 16g with 11 refills    Medication refilled per standing order.    Dipti Mirza RN

## 2020-10-23 ENCOUNTER — TELEPHONE (OUTPATIENT)
Dept: FAMILY MEDICINE | Facility: CLINIC | Age: 25
End: 2020-10-23

## 2020-10-23 NOTE — TELEPHONE ENCOUNTER
RN called pt to remind her to schedule her appt with [facility] for her pap appt. Was referred there in august and has not made an appt yet    Left  with name and callback number    Can transfer to this RN when pt calls back    Nata Atkins RN

## 2020-12-09 NOTE — TELEPHONE ENCOUNTER
REFERRAL INFORMATION:    Referring Provider:  Dr. Karen Riley/ Swapnil Horne    Referring Clinic:  [facility]    Reason for Visit/Diagnosis: New [place], BMI 45       FUTURE VISIT INFORMATION:    Appointment Date: 12/16/2020    Appointment Time: 1 PM      NOTES RECORD STATUS  DETAILS   OFFICE NOTE from Referring Provider Internal 8/11/2020 Office visit with Dr. Horne    OFFICE NOTE from Other Specialists N/A    HOSPITAL DISCHARGE SUMMARY/ ED VISITS  N/A    OPERATIVE REPORT N/A    ENDOSCOPY (EGD)  N/A    PERTINENT LABS Internal    PATHOLOGY REPORTS (RELATED) N/A    IMAGING (CT, MRI, US, XR)  N/A

## 2020-12-15 ENCOUNTER — TELEPHONE (OUTPATIENT)
Dept: ENDOCRINOLOGY | Facility: CLINIC | Age: 25
End: 2020-12-15

## 2020-12-15 NOTE — TELEPHONE ENCOUNTER
Patient interested in weight loss surgery    Monicaaris Heathbryn        Scheduled to see CNP or MALICK at 1300, followed by an appt with a dietitian at 1330.    Spoke to Patient:    Left message: yes    Can find information on bariatrix products at: [email or web address]    Instructed to view seminar and complete pre-visit questionnaire prior to visit at [email or web address].    [phone] contact center phone number      Amada Solomon, EMT

## 2020-12-16 ENCOUNTER — VIRTUAL VISIT (OUTPATIENT)
Dept: ENDOCRINOLOGY | Facility: CLINIC | Age: 25
End: 2020-12-16
Payer: COMMERCIAL

## 2020-12-16 ENCOUNTER — PRE VISIT (OUTPATIENT)
Dept: ENDOCRINOLOGY | Facility: CLINIC | Age: 25
End: 2020-12-16

## 2020-12-16 VITALS — BODY MASS INDEX: 45.64 KG/M2 | WEIGHT: 284 LBS | HEIGHT: 66 IN

## 2020-12-16 DIAGNOSIS — E66.01 CLASS 3 SEVERE OBESITY DUE TO EXCESS CALORIES WITH SERIOUS COMORBIDITY AND BODY MASS INDEX (BMI) OF 50.0 TO 59.9 IN ADULT (H): Primary | ICD-10-CM

## 2020-12-16 DIAGNOSIS — Z71.3 NUTRITIONAL COUNSELING: ICD-10-CM

## 2020-12-16 DIAGNOSIS — F41.9 ANXIETY AND DEPRESSION: ICD-10-CM

## 2020-12-16 DIAGNOSIS — E66.813 CLASS 3 SEVERE OBESITY DUE TO EXCESS CALORIES WITH SERIOUS COMORBIDITY AND BODY MASS INDEX (BMI) OF 50.0 TO 59.9 IN ADULT (H): Primary | ICD-10-CM

## 2020-12-16 DIAGNOSIS — F19.11 HX OF SUBSTANCE ABUSE (H): ICD-10-CM

## 2020-12-16 DIAGNOSIS — F32.A ANXIETY AND DEPRESSION: ICD-10-CM

## 2020-12-16 PROCEDURE — 99203 OFFICE O/P NEW LOW 30 MIN: CPT | Mod: 95 | Performed by: NURSE PRACTITIONER

## 2020-12-16 PROCEDURE — 97802 MEDICAL NUTRITION INDIV IN: CPT | Mod: 95 | Performed by: DIETITIAN, REGISTERED

## 2020-12-16 ASSESSMENT — PAIN SCALES - GENERAL: PAINLEVEL: NO PAIN (0)

## 2020-12-16 ASSESSMENT — MIFFLIN-ST. JEOR: SCORE: 2059.97

## 2020-12-16 NOTE — NURSING NOTE
"(   Chief Complaint   Patient presents with     Consult     New patient consult. Bariatrics.    )    ( Weight: 128.8 kg (284 lb) )  ( Height: 167.6 cm (5' 6\") )  ( BMI (Calculated): 45.84 )  (   )  ( Cumulative weight loss (lbs): (P) -155.18 )  ( Last Visits Weight: (P) 119.7 kg (264 lb) )  ( Wt change since last visit (lbs): (P) 20 )  (   )  (   )    (   )  (   )  (   )  (   )  (   )  (   )  (   )    (   Patient Active Problem List   Diagnosis     Obesity     Substance abuse (H)     Seasonal allergic rhinitis due to pollen     Routine general medical examination at a health care facility     Anxiety and depression     Abnormal Pap smear of cervix     Allergic rhinitis     Suicide ideation    )  (   Current Outpatient Medications   Medication Sig Dispense Refill     buPROPion (WELLBUTRIN XL) 150 MG 24 hr tablet Take 2 tablets (300 mg) by mouth every morning 60 tablet 0     cetirizine (ZYRTEC) 10 MG tablet Take 1 tablet (10 mg) by mouth daily 30 tablet 11     fluticasone (FLONASE) 50 MCG/ACT nasal spray Spray 2 sprays into both nostrils daily 16 g 2     ibuprofen (ADVIL/MOTRIN) 400 MG tablet Take 1 tablet (400 mg) by mouth every 6 hours as needed for moderate pain 100 tablet 4     ketoconazole (NIZORAL) 2 % external shampoo Apply to affected area 3 times per week 120 mL 3     lamoTRIgine (LAMICTAL) 25 MG tablet Take 50 mg by mouth daily      )  ( Diabetes Eval:    )    ( Pain Eval:  No Pain (0) )    ( Wound Eval:       )    (   History   Smoking Status     Former Smoker   Smokeless Tobacco     Never Used    )    ( Signed By:  Ayala Moon LPN; December 16, 2020; 12:39 PM )    "

## 2020-12-16 NOTE — PROGRESS NOTES
"Monica Morales is a 25 year old female who is being evaluated via a billable video visit.      The patient has been notified of following:     \"This video visit will be conducted via a call between you and your physician/provider. We have found that certain health care needs can be provided without the need for an in-person physical exam.  This service lets us provide the care you need with a video conversation.  If a prescription is necessary we can send it directly to your pharmacy.  If lab work is needed we can place an order for that and you can then stop by our lab to have the test done at a later time.    Video visits are billed at different rates depending on your insurance coverage.  Please reach out to your insurance provider with any questions.    If during the course of the call the physician/provider feels a video visit is not appropriate, you will not be charged for this service.\"    Patient has given verbal consent for Video visit? Yes  How would you like to obtain your AVS? MyChart  If you are dropped from the video visit, the video invite should be resent to: Send to e-mail at: [email or web address]  Will anyone else be joining your video visit? No        Video-Visit Details    Type of service:  Video Visit    Video Start Time: 1341  Video End Time: 1415    Originating Location (pt. Location): Home    Distant Location (provider location):  [facility] [place]     Platform used for Video Visit: Kristi Soares NP        New Bariatric Surgery Consultation Note    2020    RE: Monica Morales  MR#: [id number]  : 1995      Referring provider:       2020   Who referred you? Doctor From [facility]       Chief Complaint/Reason for visit: evaluation for possible weight loss surgery    Dear Gini Schaefer MD (General),    I had the pleasure of seeing your patient, Monica Morales, to evaluate her obesity and consider " "her for possible weight loss surgery. As you know, Monica Morales is 25 year old.  She has a height of 5' 6\", a weight of 284 lbs 0 oz, and calculated Body mass index is 45.84 kg/m .      HISTORY OF PRESENT ILLNESS:  Weight Loss History Reviewed with Patient 12/16/2020   How long have you been overweight? Since early childhood   What is the most that you have ever weighed? 290   What is the most weight you have lost? 50   I have tried the following methods to lose weight Watching portions or calories, Exercise, Pre packaged meals ex: Nutrisystem, OTC Medications, Physician directed program   I have tried the following weight loss medications? (Check all that apply) None   Have you ever had weight loss surgery? No     Has considered weight loss surgery for a while but wanted to try on her own.   Most recent attempt- walking rather than driving places, watching what she was eating. Lost 10lbs but then started regaining.     CO-MORBIDITIES OF OBESITY INCLUDE:     12/16/2020   I have the following health issues associated with obesity: None of the above     A1C 8/2020 5.4  Sleep- snores, doesn't consistently wake rested, tosses and turns a lot. Some napping during the day. No excessive sleepiness   No reflux      Friends have had gastric bypass with good success     PAST MEDICAL HISTORY:  Past Medical History:   Diagnosis Date     Anxiety      Depressive disorder      No hx blood clots     PAST SURGICAL HISTORY:  No past surgical history on file.    FAMILY HISTORY:   Family History   Problem Relation Age of Onset     Asthma Father      Hypertension Father      Alcohol/Drug Father      Hypertension Paternal Grandmother      Depression Mother      Diabetes Mother        SOCIAL HISTORY:   Social History Questions Reviewed With Patient 12/16/2020   Which best describes your employment status (select all that apply) I am a student   Which best describes your marital status: single   Do you have children? No   Who do you " have in your support network that can be available to help you for the first 2 weeks after surgery? [employer]   Who can you count on for support throughout your weight loss surgery journey? friends family [employer]   Can you afford 3 meals a day?  Yes   Can you afford 50-60 dollars a month for vitamins? Yes       HABITS:     12/16/2020   How often do you drink alcohol? Never   Have you ever used any of the following nicotine products? E-cigarettes   If you previously used any of these products, what year did you quit? 2018   Have you or are you currently using street drugs or prescription strength medication for which you do not have a prescription for? No   Do you have a history of chemical dependency (alcohol or drug abuse)? 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 Never.   Have you had thoughts of suicide in the past year? No   Have you ever been hospitalized for mental illness or a suicide attempt? In the last 1 to 5 years.   Do you have a history of chronic pain? No   Have you ever been diagnosed with fibromyalgia? No   Are you currently being treated for any of the following? (select all that apply) Depression, Anxiety   Are you currently seeing a therapist or counselor?  Yes   Are you currently seeing a psychiatrist? Yes     6/6/2019, 3/2019  hospitalized for SI, Depression     ROS:     12/16/2020   Skin:  Skin fold rashes (groin or other folds)   HEENT: None of these   Musculoskeletal: Joint Pain, Swelling of legs   Cardiovascular: Shortness of breath with activity   Pulmonary: Shortness of breath with activity, Snoring   Gastrointestinal: None of the above   Genitourinary: None of the above   Hematological: None of the above   Neurological: None of the above   Female only: Regular menstrual cycles       EATING BEHAVIORS:     12/16/2020   Have you or anyone else thought that  "you had an eating disorder? No   Do you currently binge eat (eat a large amount of food in a short time)? No   Are you an emotional eater? No   Do you get up to eat after falling asleep? No     Difficult to decrease portion sizes   Eats till she is really full, difficult to stop sooner     EXERCISE:     12/16/2020   How often do you exercise? 3 to 4 times per week   What is the duration of your exercise (in minutes)? 30 Minutes   What types of exercise do you do? walking, weightlifting   What keeps you from being more active?  Too tired       MEDICATIONS:  Current Outpatient Medications   Medication Sig Dispense Refill     buPROPion (WELLBUTRIN XL) 150 MG 24 hr tablet Take 2 tablets (300 mg) by mouth every morning 60 tablet 0     cetirizine (ZYRTEC) 10 MG tablet Take 1 tablet (10 mg) by mouth daily 30 tablet 11     fluticasone (FLONASE) 50 MCG/ACT nasal spray Spray 2 sprays into both nostrils daily 16 g 2     ibuprofen (ADVIL/MOTRIN) 400 MG tablet Take 1 tablet (400 mg) by mouth every 6 hours as needed for moderate pain 100 tablet 4     ketoconazole (NIZORAL) 2 % external shampoo Apply to affected area 3 times per week 120 mL 3     lamoTRIgine (LAMICTAL) 25 MG tablet Take 50 mg by mouth daily         ALLERGIES:  Allergies   Allergen Reactions     Latex      Seasonal Allergies Itching       LABS/IMAGING/MEDICAL RECORDS REVIEW:     PHYSICAL EXAM:  Ht 1.676 m (5' 6\")   Wt 128.8 kg (284 lb)   BMI 45.84 kg/m        In summary, Monica Morales has Class III obesity with a body mass index of Body mass index is 45.84 kg/m . kg/m2 and the comorbidities stated above. She completed an informational seminar and is a candidate for the laparoscopic gastric sleeve.  She will have to complete the following pre-requisites:    Received weight loss goal of 10 lb prior to surgery. 274lb   Dietitian x 6   Have preoperative laboratory tests drawn.  Psychological Evaluation with MMPI and clearance for weight loss surgery.  Letter of " clearance from the following primary care provider, therapist, psychiatrist     Defer weight loss medications for now     Consent to receive bariatric support group info by email? yes  Preferred email: [email or web address]     Today in the office we discussed gastric sleeve surgery. Preoperative, perioperative, and postoperative processes, management, and follow up were addressed.  Risks and benefits were outlined including the risk of death, staple line leak (1-2%), PE, DVT, ulcer, worsening GERD, N/V, stricture, hernia, wound infection, weight regain, and vitamin deficiencies. I emphasized exercise and activity along with appropriate food choice as the main foundation for weight loss with surgery providing surgical reinforcement of this.  All questions were answered.  A goal sheet and support group handout were given to the patient.    Once the patient has completed the requirements in their task list and there are no further recommendations, the pt will be allowed to see the surgeon of her choice for consultation on the laparoscopic gastric sleeve surgery. Patient verbalizes understanding of the process to surgery and expectations for the postoperative period including the need for lifelong lifestyle changes, vitamin supplementation, and laboratory monitoring.      Is patient currently taking narcotic/opioid medications? NO  Sincerely,     Sadia Soares NP    I spent a total of 35 minutes face to face with the patient during today's office visit. Over 50% of this time was spent counseling the patient and/or coordinating care.    Bariatric Task List  Status:  Is patient a candidate for bariatric surgery?:  patient is a candidate for bariatric surgery -     Cleared to schedule surgeon consult?:    -     Status:    -     Surgeon: Wise  -     Tentative surgery month/year: June 2021 -        Insurance: Insurance:  Blue Plus  -     Cigna: PCP Recommendation and Medical Clearance:    -     HP Referral:    -      Other:    -        Patient Info: Initial Weight:  284 -     Date of Initial Weight/Height:  12/16/2020 -     Goal Weight (lbs):  274 -     Required Weight Loss:  10 -     Surgery Type:  sleeve gastrectomy -     Multidisciplinary Meeting:    -        Dietician Visits: Structured weight loss required by insurance?:  structured weight loss required -     Dietician Visit 1:  Needed -     Dietician Visit 2:  Needed -     Dietician Visit 3:  Needed -     Dietician Visit 4:  Needed -     Dietician Visit 5:  Needed -     Dietician Visit 6:  Needed -     Dietician Visit additional:    -     Clearance from dietician to see surgeon?:    -     Dietician Notes:    -        Psychological Evaluation: Psych eval:  Needed -     Therapist letter of support:  Needed -     Psychiatrist letter of support:  Needed -     Establish care with therapist:    -     Complete eating disorder evaluation:    -     Letter of clearance from therapist/eating disorder program:    -     Other:    -        Lab Work: Complete Blood Count:  Needed -     Comprehensive Metabolic Panel:  Needed -     Vitamin D:  Needed -     Hgb A1c:  Needed -     PTH:  Needed -     H. pylori:    -     TSH:  Needed -     Nicotine Testing:    -     Other:  Needed - lipids      Consults/ Clearance: Sleep Medicine:    -     Cardiac:    -     Pain:   -     Dental:    -     Endocrine:    -     Gastroenterology:    -     Vascular Medicine:    -     Hematology:    -     Medical Weight Management:   -     Physical Therapy/Exercise:    -     Nephrology:    -     Neurology:    -     Pulmonology:    -     Rheumatology:    -     Other    -     Other    -     Other    -           Testing: UGI:    -     EGD:    -     Other:    -        PCP: Establish care with PCP:  Completed -     Follow up with PCP:    -     PCP letter of support:  Needed -        Smoking: Quit tobacco use (3 months smoke free)?:    -     Quit date:    -        Patient Education:  Information Session:    -     Given  "\"Making your decision\" handout?:    -     Given support group information?:    -     Attended support group?:    -     Support plan in place?:    -     Research consents signed?:    -        Additional Surgery Requirements: Review Coag plan:    -     HgA1c <8:    -     Inpatient pain consult:    -     Final nicotine screen:    -     Dental work complete:    -     Birth control plan:    -     Other Requirements:    -     Other Requirements:    -        Final Tasks:  Before surgery online class:  Needed -     Before surgery online class website link:  [email or web address]   After surgery online class:  Needed -     After surgery online class website link:  [email or web address]   Nurse visit for weigh-in and information:  Needed -     Pre-assessment clinic visit with anesthesia team for H&P:  Needed -     Final labs (Hgb, plt, T&S, UA):  Needed -        Notes:   -          "

## 2020-12-16 NOTE — PROGRESS NOTES
"Monica Morales is a 25 year old female who is being evaluated via a billable video visit.      The patient has been notified of following:     \"This video visit will be conducted via a call between you and your physician/provider. We have found that certain health care needs can be provided without the need for an in-person physical exam.  This service lets us provide the care you need with a video conversation.  If a prescription is necessary we can send it directly to your pharmacy.  If lab work is needed we can place an order for that and you can then stop by our lab to have the test done at a later time.    Video visits are billed at different rates depending on your insurance coverage.  Please reach out to your insurance provider with any questions.    If during the course of the call the physician/provider feels a video visit is not appropriate, you will not be charged for this service.\"    Patient has given verbal consent for Video visit? Yes  How would you like to obtain your AVS? MyChart  If you are dropped from the video visit, the video invite should be resent to: Send to e-mail at: [email or web address]  Will anyone else be joining your video visit? No        Video-Visit Details    Type of service:  Video Visit    Video Start Time: 2:02 PM  Video End Time: 2:31 PM    Originating Location (pt. Location): Home    Distant Location (provider location):  [facility] [place]     Platform used for Video Visit: [facility]    New Bariatric Nutrition Consultation Note    Reason For Visit: Nutrition Assessment    Monica Morales is a 25 year old presenting today for new bariatric nutrition consult.   Pt is interested in laparoscopic sleeve gastrectomy with Dr. Cuevas expected surgery in June 2021.  Patient is accompanied by self.  This is pt's first of 6 required nutrition visits prior to surgery.     Pt referred by Sadia Soares NP on December 16, 2020.  Patient with " "Co-morbidities of obesity including:  Type II DM no  Renal Failure no  Sleep apnea no  Hypertension no   Dyslipidemia no  Joint pain no  Back pain no  GERD no     Support System Reviewed With Patient 12/16/2020   Who do you have in your support network that can be available to help you for the first 2 weeks after surgery? [employer]   Who can you count on for support throughout your weight loss surgery journey? friends family [facility]       ANTHROPOMETRICS:  Estimated body mass index is 45.84 kg/m  as calculated from the following:    Height as of an earlier encounter on 12/16/20: 1.676 m (5' 6\").    Weight as of an earlier encounter on 12/16/20: 128.8 kg (284 lb).    Required weight loss goal pre-op: 10 lbs from initial consult weight (goal weight 274 lbs or less before surgery)       12/16/2020   I have tried the following methods to lose weight Watching portions or calories, Exercise, Pre packaged meals ex: Nutrisystem, OTC Medications, Physician directed program       Weight Loss Questions Reviewed With Patient 12/16/2020   How long have you been overweight? Since early childhood       SUPPLEMENT INFORMATION:  None     NUTRITION HISTORY:  Over all goals: decrease weight, would like to feel healthy again and enjoy being active/movement easier. Would like to walk up 3 flights of stairs without feeling out of breath.     Recall Diet Questions Reviewed With Patient 12/16/2020   Describe what you typically consume for breakfast (typical or most recent): cereal and coffee   Describe what you typically consume for lunch (typical or most recent): fastfood tacos, pasta   Describe what you typically consume for supper (typical or most recent): fast food, burger, chicken, taco ,chipotle   Describe what you typically consume as snacks (typical or most recent): chips, sweets, fruit   How many ounces of water, or other low calorie drinks, do you drink daily (8 oz=1 glass)? 32 oz-water, infusion with fruit, crystal lght    How many " ounces of caffeine (coffee, tea, pop) do you drink daily (8 oz=1 glass)? 16 oz-sweet tea   How many ounces of carbonated (pop, beer, sparkling water) drinks do you drinky daily (8 oz=1 glass)? 16 oz   How many ounces of juice, pop, sweet tea, sports drinks, protein drinks, other sweetened drinks, do you drink daily (8 oz=1 glass)? 8 oz   How often do you drink alcohol? Never       Eating Habits 12/16/2020   Do you have any dietary restrictions? No   Do you currently binge eat (eat a large amount of food in a short time)? No   Are you an emotional eater? No   Do you get up to eat after falling asleep? No   What foods do you crave? sweet, spicy, savory       ADDITIONAL INFORMATION:    Dining Out History Reviewed With Patient 12/16/2020   How often do you dine out? Nearly every day.   Where do you dine out? (select all that apply) fast food chains, take out   What types of food do you order when you dine out? american mexican       Physical Activity Reviewed With Patient 12/16/2020   How often do you exercise? 3 to 4 times per week   What is the duration of your exercise (in minutes)? 30 Minutes   What types of exercise do you do? walking, weightlifting   What keeps you from being more active?  Too tired         NUTRITION DIAGNOSIS:  Obesity r/t long history of self-monitoring deficit and excessive energy intake aeb BMI >30 kg/m2.    INTERVENTION:  Intervention Provided/Education Provided on post-op diet guidelines, vitamins/minerals essential post-operatively, GI anatomy of bariatric surgeries, ways to help prepare for post-op diet guidelines pre-operatively, portion/calorie-control, mindful eating and sources of protein.  Patient demonstrates understanding. Provided pt with list of goals RD contact information.    - AVS via [employer]   Questions Reviewed With Patient 12/16/2020   How ready are you to make changes regarding your weight? Number 1 = Not ready at all to make changes up to 10 = very ready. 9   How confident  "are you that you can change? 1 = Not confident that you will be successful making changes up to 10 = very confident. 9       Expected Engagement: good    GOALS:  Relating To Eating:  Eat slowly (20-30 minutes per meal), chewing foods well (25 chews per bite/applesauce consistency)  9\" Plate method (1/2 plate non-starchy vegetables/fruit, 1/4 plate lean protein, 1/4 plate whole grain starch - no more than 1/2 cup carb/meal)    Relating to beverages:  Reduce caffeine/carbonation/calorie containing beverages  Separate fluids from meals by 30 minutes before, during, and after eating    Relating to dietary supplements:  Start a multivitamin containing iron daily    Relating to activity:  Increase activity as able     Initial Consult / Weight Loss    My Plate Planner_English - Pt Education  [email or web address]    Protein Sources for Weight Loss  [email or web address]     Carbohydrates  [email or web address]       Time spent with patient: 29 minutes.  Lynn Iverson, MS, RD, LD        "

## 2020-12-16 NOTE — LETTER
"12/16/2020       RE: Monica Morales  [address] [facility] [address]     Dear Colleague,    Thank you for referring your patient, Monica Morales, to the [facility] [place] at [facility]. Please see a copy of my visit note below.    Monica Morales is a 25 year old female who is being evaluated via a billable video visit.      The patient has been notified of following:     \"This video visit will be conducted via a call between you and your physician/provider. We have found that certain health care needs can be provided without the need for an in-person physical exam.  This service lets us provide the care you need with a video conversation.  If a prescription is necessary we can send it directly to your pharmacy.  If lab work is needed we can place an order for that and you can then stop by our lab to have the test done at a later time.    Video visits are billed at different rates depending on your insurance coverage.  Please reach out to your insurance provider with any questions.    If during the course of the call the physician/provider feels a video visit is not appropriate, you will not be charged for this service.\"    Patient has given verbal consent for Video visit? Yes  How would you like to obtain your AVS? MyChart  If you are dropped from the video visit, the video invite should be resent to: Send to e-mail at: [email or web address]  Will anyone else be joining your video visit? No        Video-Visit Details    Type of service:  Video Visit    Video Start Time: 2:02 PM  Video End Time: 2:31 PM    Originating Location (pt. Location): Home    Distant Location (provider location):  [facility] [place]     Platform used for Video Visit: AmWell    New Bariatric Nutrition Consultation Note    Reason For Visit: Nutrition Assessment    Monica Morales is a 25 year old presenting today " "for new bariatric nutrition consult.   Pt is interested in laparoscopic sleeve gastrectomy with Dr. Cuevas expected surgery in June 2021.  Patient is accompanied by self.  This is pt's first of 6 required nutrition visits prior to surgery.     Pt referred by Sadia Soares NP on December 16, 2020.  Patient with Co-morbidities of obesity including:  Type II DM no  Renal Failure no  Sleep apnea no  Hypertension no   Dyslipidemia no  Joint pain no  Back pain no  GERD no     Support System Reviewed With Patient 12/16/2020   Who do you have in your support network that can be available to help you for the first 2 weeks after surgery? [place]   Who can you count on for support throughout your weight loss surgery journey? friends family [facility]       ANTHROPOMETRICS:  Estimated body mass index is 45.84 kg/m  as calculated from the following:    Height as of an earlier encounter on 12/16/20: 1.676 m (5' 6\").    Weight as of an earlier encounter on 12/16/20: 128.8 kg (284 lb).    Required weight loss goal pre-op: 10 lbs from initial consult weight (goal weight 274 lbs or less before surgery)       12/16/2020   I have tried the following methods to lose weight Watching portions or calories, Exercise, Pre packaged meals ex: Nutrisystem, OTC Medications, Physician directed program       Weight Loss Questions Reviewed With Patient 12/16/2020   How long have you been overweight? Since early childhood       SUPPLEMENT INFORMATION:  None     NUTRITION HISTORY:  Over all goals: decrease weight, would like to feel healthy again and enjoy being active/movement easier. Would like to walk up 3 flights of stairs without feeling out of breath.     Recall Diet Questions Reviewed With Patient 12/16/2020   Describe what you typically consume for breakfast (typical or most recent): cereal and coffee   Describe what you typically consume for lunch (typical or most recent): fastfood tacos, pasta   Describe what you typically consume for supper " (typical or most recent): fast food, burger, chicken, taco ,chipotle   Describe what you typically consume as snacks (typical or most recent): chips, sweets, fruit   How many ounces of water, or other low calorie drinks, do you drink daily (8 oz=1 glass)? 32 oz-water, infusion with fruit, crystal lght    How many ounces of caffeine (coffee, tea, pop) do you drink daily (8 oz=1 glass)? 16 oz-sweet tea   How many ounces of carbonated (pop, beer, sparkling water) drinks do you drinky daily (8 oz=1 glass)? 16 oz   How many ounces of juice, pop, sweet tea, sports drinks, protein drinks, other sweetened drinks, do you drink daily (8 oz=1 glass)? 8 oz   How often do you drink alcohol? Never       Eating Habits 12/16/2020   Do you have any dietary restrictions? No   Do you currently binge eat (eat a large amount of food in a short time)? No   Are you an emotional eater? No   Do you get up to eat after falling asleep? No   What foods do you crave? sweet, spicy, savory       ADDITIONAL INFORMATION:    Dining Out History Reviewed With Patient 12/16/2020   How often do you dine out? Nearly every day.   Where do you dine out? (select all that apply) fast food chains, take out   What types of food do you order when you dine out? american mexican       Physical Activity Reviewed With Patient 12/16/2020   How often do you exercise? 3 to 4 times per week   What is the duration of your exercise (in minutes)? 30 Minutes   What types of exercise do you do? walking, weightlifting   What keeps you from being more active?  Too tired         NUTRITION DIAGNOSIS:  Obesity r/t long history of self-monitoring deficit and excessive energy intake aeb BMI >30 kg/m2.    INTERVENTION:  Intervention Provided/Education Provided on post-op diet guidelines, vitamins/minerals essential post-operatively, GI anatomy of bariatric surgeries, ways to help prepare for post-op diet guidelines pre-operatively, portion/calorie-control, mindful eating and  "sources of protein.  Patient demonstrates understanding. Provided pt with list of goals RD contact information.    - AVS via Runner   Questions Reviewed With Patient 12/16/2020   How ready are you to make changes regarding your weight? Number 1 = Not ready at all to make changes up to 10 = very ready. 9   How confident are you that you can change? 1 = Not confident that you will be successful making changes up to 10 = very confident. 9       Expected Engagement: good    GOALS:  Relating To Eating:  Eat slowly (20-30 minutes per meal), chewing foods well (25 chews per bite/applesauce consistency)  9\" Plate method (1/2 plate non-starchy vegetables/fruit, 1/4 plate lean protein, 1/4 plate whole grain starch - no more than 1/2 cup carb/meal)    Relating to beverages:  Reduce caffeine/carbonation/calorie containing beverages  Separate fluids from meals by 30 minutes before, during, and after eating    Relating to dietary supplements:  Start a multivitamin containing iron daily    Relating to activity:  Increase activity as able     Initial Consult / Weight Loss    My Plate Planner_English - Pt Education  [email or web address]    Protein Sources for Weight Loss  [email or web address]     Carbohydrates  [email or web address]       Time spent with patient: 29 minutes.  Lynn Iverson, MS, RD, LD      "

## 2020-12-16 NOTE — PATIENT INSTRUCTIONS
"Thank you for allowing us the privilege of caring for you. We hope we provided you with the excellent service you deserve.   Please let us know if there is anything else we can do for you so that we can be sure you are completely satisfied with your care experience.    To ensure the quality of our services you may be receiving a patient satisfaction survey from an independent patient satisfaction monitoring company.    The greatest compliment you can give is a \"Likely to Recommend\"    Your visit was with Sadia Soares NP today.    Instructions per today's visit:     Jayden Morales, it was great to visit with you today.  Here is a review of our visit.  If our clinic scheduler is not able to reach you please call [phone] to schedule your next appointments.    Read Bariatric Surgery packet- in Rec[employer] message  Dietitian visits x 6  See Dr. Cuevas after psych - in person or virtual   Schedule Psych Consult - see [employer]   Schedule Clearances therapist, psychiatrist, primary care provider   Follow up with me in 2 month, sooner if wanting to talk about medications   Labs ordered to be done at any [facility] - call to schedule     Call Dharmesh Milligan at [phone] to discuss insurance coverage for bariatric surgery.  Please check with your insurance regarding bariatric surgery coverage also.    Consent to receive bariatric support group info by email? yes  Preferred email: [email or web address]     Information about Video Visits with [facility]: video visit information    Please call our contact center at [phone] to schedule your next appointments.  To find a lab location near you, please call [phone].  For any nursing questions or concerns call Carmen Zelaya LPN at [phone] or Jade Nelson RN at [phone]  Please call during clinic hours Monday through Friday 8:00a - 4:00p if you have questions or you can contact us via [employer] at anytime and we will reply " during clinic hours.    Lab results will be communicated through My Chart or letter (if My Chart not used). Please call the clinic if you have not received communication after 1 week or if you have any questions.?  Clinic Fax: [phone]  ______________________________________________________________________________________________________________________  Meal Replacement Products:    Here is the link to our new e-store where you can purchase our meal replacement products    MEDSEEK E-Store  [email or web address]    The one week starter kit is a great way to sample a variety of products and see what works for you.    If you want more information about the product go to: [email or web address]    If you are an employee or [facility] or [employer] please contact your care team for a 10% estore discount    Free Shipping for orders over $75     Benefits of meal replacements products:    Portion and calorie control  Improved nutrition  Structured eating  Simplified food choices  Avoid contact with trigger foods  _________________________________________________________________________________________________________________________________  Interested in working with a health ?  Health coaches work with you to improve your overall health and wellbeing.  They look at the whole person, and may involve discussion of different areas of life, including, but not limited to the four pillars of health (sleep, exercise, nutrition, and stress management). Discuss with your care team if you would like to start working a health .  Health Coaching-3 Pack: Schedule by calling [phone]    $99 for three health coaching visits    Visits may be done in person or via phone    Coaching is a partnership between the  and the client; Coaches do not prescribe or diagnose    Coaching helps inspire the client to reach his/her personal goals    _________________________________________________________________________________________________________________________________  SUPPORT GROUPS    [facility] Weight Loss Surgery Support Group    [facility] Weight Loss Surgery Support Group    The [facility] Weight Loss Surgery Support Group is a patient-lead forum that meets monthly. Due to Covid-19, we are meeting remotely from 5 PM - 6 PM via Microsoft Teams. The group is facilitated by Margy Montano, the program s Clinical Coordinator. The support group shares experiences, encouragement and education. It is open to those who have had weight loss surgery, are scheduled for surgery, and those who are considering surgery.   If you are interested in attending, please contact the clinic via [employer] or call the nurse line directly at [phone] to inform our staff that you would like an invite sent to you. Please let us know the email you would like the invite sent to. Prior to the meeting, a link with directions on how to join the meeting will be sent to you.    2020 Meetings     December 2 - The group will not have a speaker. The focus will be to offer support to one another during the Holiday season.    2021 Meetings    January 20 - Guest Speaker: Donato Umana RD, LD     February 17 - The group will not have a speaker. This will be a time of group support.     March 17 - Guest Speaker: Nuvia Diamond, [facility], [facility], CPT Health     [facility] Support Groups    Connections: Bariatric Care Support Group?  This group takes place the second Tuesday of each month from 6:30 PM - 8 PM virtually using Microsoft Teams. It is led by Demetrius Ledesma, Ph.D who is a Licensed Psychologist with the [facility] Comprehensive Weight Management Program. There is no cost for group participation and it is open to all [facility] (and those external to this program) pre- and  post- operative bariatric surgery patients as well as their support system.   Please send an email to Demetrius Ledesma, Ph.D., LP at?[email or web address]?if you would like an invitation to the group and to learn about using Microsoft Teams.    2020 Meetings     November 10 - Healthy Eating  Guest Speaker: Vonnie Holland RD, GIBSON     December 8    Connections: Post-Operative Bariatric Surgery Support Group  This support group meets the 4th Wednesday of the month from 11 AM - 12 PM virtually using Microsoft Teams. It is led by Debbie Hatfield RN. This is a support group for [facility] bariatric patients (and those external to [facility]) who have had bariatric surgery and are at least 3 months post-surgery. There is no cost to attend and registration is not required. Please send an email to Debbie Hatfield RN at [email or web address] if you would like an invitation to the group and to learn about using Microsoft Teams.    2020 Meetings November 25 December 23    [facility] Healthy Lifestyle Virtual Support Group    Healthy Lifestyle Virtual Support Group?    This group is held monthly on a Friday on the fourth Friday from 12:30 PM - to 1:30 PM. It is 60 minutes of small group guided discussion, support and resources led by National Board Certified Health , Debbie Zarate. All are welcome who want a healthy lifestyle. To receive monthly invites to the Healthy Lifestyle Virtual Support Group or if you have any questions about having a health  or attending support group, please email Debbie [email or web address]. Debbie will send out invites for each session, with the phone number and the conference ID number specific to that session.    2020 Meetings November 13 - The Importance of Self Care and Connection During Covid    December 18 - Open Forum    2021 Meetings    January 29 - How to Stay Active and Healthy during the Winter Months    February 26 -  Reading Food Labels: What do I Need to Know?  Guest Speaker: Myesha Leggett, BELEN    March 19 - Finding Health, Happiness and Confidence at Every Size  Guest Speaker: Shala Christopher, Health Psychology Fellow    April 30 - Healthy Eating on a Budget  Guest Speaker: Lynn Iverson, BELEN    May 21 - Open Forum  _____________________________________________________________________________________________________________________________  [place] [facility] Get Moving Program  Our team of physical therapists is trained to help you understand and take control of your condition. They will perform a thorough evaluation to determine your ability for activity and develop a customized plan to fit your goals and physical ability.  Scheduling: Unsure if the Get Moving program is right for you? Discuss the program with your medical provider or diabetes educator. You can also call us at [phone] to ask questions or schedule an appointment.   [facility] Get Moving Program  ______________________________________________________________________________________________________________________  [facility] Diabetes Prevention Program (DPP)  If you have prediabetes and Medicare please contact us by MyChart or phone to learn more about our Diabetes Prevention Program  _______________________________________________________________________________________________________________________  Bluetooth Scale:    We hope to provide you with high quality telephone and virtual healthcare visits while social distancing for COVID-19 is necessary, as well as in the future when virtual visits may be more convenient for you.     Our technology team made it possible for Bluetooth scales to send weight measurements to our electronic medical record. This allows weights from you weighing at home to securely flow into the medical record, which will improve telephone and virtual visits.   Additionally, studies have shown that adults actually lose  more weight when their weights are automatically sent to someone else, and also that this process is not stressful for those adults.    Below is a link for purchasing the scale, with a discount code for our patients. You may call your insurance company to see if they will reimburse you for the cost of the scale, as a piece of durable medical equipment. The scales only go up to a weight of 400 pounds. This is an issue and we are working with the developer on increasing this. We found no scales that go over 400lb that have blue-tooth for connecting to Spotted.    Scale to purchase: the [employer]  Body  Scale: [email or web address]=aw.ds    Discount Code: We have a discount code for our patients to bring the cost down to $50, Discount code is: [place]_Scale_20%off  Thank juliana,   [facility] Comprehensive Weight Management Team  Bariatric Task List  Status:  Is patient a candidate for bariatric surgery?:  patient is a candidate for bariatric surgery -     Cleared to schedule surgeon consult?:    -     Status:    -     Surgeon: Wise  -     Tentative surgery month/year: June 2021 -        Insurance: Insurance:  Blue Plus  -     Cigna: PCP Recommendation and Medical Clearance:    -      Referral:    -     Other:    -        Patient Info: Initial Weight:  284 -     Date of Initial Weight/Height:  12/16/2020 -     Goal Weight (lbs):  274 -     Required Weight Loss:  10 -     Surgery Type:  sleeve gastrectomy -     Multidisciplinary Meeting:    -        Dietician Visits: Structured weight loss required by insurance?:  structured weight loss required -     Dietician Visit 1:  Needed -     Dietician Visit 2:  Needed -     Dietician Visit 3:  Needed -     Dietician Visit 4:  Needed -     Dietician Visit 5:  Needed -     Dietician Visit 6:  Needed -     Dietician Visit additional:    -     Clearance from dietician to see surgeon?:     "-     Dietician Notes:    -        Psychological Evaluation: Psych eval:  Needed -     Therapist letter of support:  Needed -     Psychiatrist letter of support:  Needed -     Establish care with therapist:    -     Complete eating disorder evaluation:    -     Letter of clearance from therapist/eating disorder program:    -     Other:    -        Lab Work: Complete Blood Count:  Needed -     Comprehensive Metabolic Panel:  Needed -     Vitamin D:  Needed -     Hgb A1c:  Needed -     PTH:  Needed -     H. pylori:    -     TSH:  Needed -     Nicotine Testing:    -     Other:  Needed - lipids      Consults/ Clearance: Sleep Medicine:    -     Cardiac:    -     Pain:   -     Dental:    -     Endocrine:    -     Gastroenterology:    -     Vascular Medicine:    -     Hematology:    -     Medical Weight Management:   -     Physical Therapy/Exercise:    -     Nephrology:    -     Neurology:    -     Pulmonology:    -     Rheumatology:    -     Other    -     Other    -     Other    -           Testing: UGI:    -     EGD:    -     Other:    -        PCP: Establish care with PCP:  Completed -     Follow up with PCP:    -     PCP letter of support:  Needed -        Smoking: Quit tobacco use (3 months smoke free)?:    -     Quit date:    -        Patient Education:  Information Session:    -     Given \"Making your decision\" handout?:    -     Given support group information?:    -     Attended support group?:    -     Support plan in place?:    -     Research consents signed?:    -        Additional Surgery Requirements: Review Coag plan:    -     HgA1c <8:    -     Inpatient pain consult:    -     Final nicotine screen:    -     Dental work complete:    -     Birth control plan:    -     Other Requirements:    -     Other Requirements:    -        Final Tasks:  Before surgery online class:  Needed -     Before surgery online class website link:  [email or web address]   After surgery online class:  Needed -   "   After surgery online class website link:  [email or web address]   Nurse visit for weigh-in and information:  Needed -     Pre-assessment clinic visit with anesthesia team for H&P:  Needed -     Final labs (Hgb, plt, T&S, UA):  Needed -        Notes:   -

## 2020-12-16 NOTE — Clinical Note
NBS. Blue Plus. Cuevas. Needs Psych First then surgeon visit. Getting new therapist so she'll wait a few months on the therapist letter. Jade, can you please send out information and letters. Thanks!

## 2020-12-21 ENCOUNTER — PATIENT OUTREACH (OUTPATIENT)
Dept: ENDOCRINOLOGY | Facility: CLINIC | Age: 25
End: 2020-12-21

## 2020-12-21 DIAGNOSIS — E66.01 MORBID OBESITY (H): Primary | ICD-10-CM

## 2020-12-21 NOTE — PROGRESS NOTES
Task list updated and information/clearance letters sent to patient via [employer].    Bariatric Task List  Status:  Is patient a candidate for bariatric surgery?:  patient is a candidate for bariatric surgery -     Cleared to schedule surgeon consult?:    -     Status:    -     Surgeon: Wise  -     Tentative surgery month/year: June 2021 -        Insurance: Insurance:  Blue Plus  -     Cigna: PCP Recommendation and Medical Clearance:    -     HP Referral:    -     Other:    -        Patient Info: Initial Weight:  284 -     Date of Initial Weight/Height:  12/16/2020 -     Goal Weight (lbs):  274 -     Required Weight Loss:  10 -     Surgery Type:  sleeve gastrectomy -     Multidisciplinary Meeting:    -        Dietician Visits: Structured weight loss required by insurance?:  structured weight loss required -     Dietician Visit 1:  Needed -     Dietician Visit 2:  Needed -     Dietician Visit 3:  Needed -     Dietician Visit 4:  Needed -     Dietician Visit 5:  Needed -     Dietician Visit 6:  Needed -     Dietician Visit additional:    -     Clearance from dietician to see surgeon?:    -     Dietician Notes:    -        Psychological Evaluation: Psych eval:  Needed - List and letter sent to pt 12/21 - As   Therapist letter of support:  Needed - Letter sent to pt 12/21 - AS   Psychiatrist letter of support:  Needed - Letter sent to pt 12/21 - AS   Establish care with therapist:    -     Complete eating disorder evaluation:    -     Letter of clearance from therapist/eating disorder program:    -     Other:    -        Lab Work: Complete Blood Count:  Needed - Ordered 12/21 - AS   Comprehensive Metabolic Panel:  Needed - Ordered 12/21 - AS   Vitamin D:  Needed - Ordered 12/21 - AS   Hgb A1c:  Needed - Ordered 12/21 - AS   PTH:  Needed - Ordered 12/21 - AS   H. pylori:    -     TSH:  Needed - Ordered 12/21 - AS   Nicotine Testing:    -     Other:  Needed - lipids Ordered 12/21 - AS      Consults/ Clearance: Sleep  "Medicine:    -     Cardiac:    -     Pain:   -     Dental:    -     Endocrine:    -     Gastroenterology:    -     Vascular Medicine:    -     Hematology:    -     Medical Weight Management:   -     Physical Therapy/Exercise:    -     Nephrology:    -     Neurology:    -     Pulmonology:    -     Rheumatology:    -     Other    -     Other    -     Other    -           Testing: UGI:    -     EGD:    -     Other:    -        PCP: Establish care with PCP:  Completed -     Follow up with PCP:    -     PCP letter of support:  Needed -        Smoking: Quit tobacco use (3 months smoke free)?:    -     Quit date:    -        Patient Education:  Information Session:    -     Given \"Making your decision\" handout?:  Given \"\"Making your decision\"\" handout? - 12/21 -AS   Given support group information?:  support group contact information provided - 12/21 -AS   Attended support group?:    -     Support plan in place?:    -     Research consents signed?:    -        Additional Surgery Requirements: Review Coag plan:    -     HgA1c <8:    -     Inpatient pain consult:    -     Final nicotine screen:    -     Dental work complete:    -     Birth control plan:    -     Other Requirements:    -     Other Requirements:    -        Final Tasks:  Before surgery online class:  Needed -     Before surgery online class website link:  [email or web address]   After surgery online class:  Needed -     After surgery online class website link:  [email or web address]   Nurse visit for weigh-in and information:  Needed -     Pre-assessment clinic visit with anesthesia team for H&P:  Needed -     Final labs (Hgb, plt, T&S, UA):  Needed -        Notes:   -               "

## 2020-12-28 ENCOUNTER — TELEPHONE (OUTPATIENT)
Dept: GASTROENTEROLOGY | Facility: CLINIC | Age: 25
End: 2020-12-28

## 2020-12-28 NOTE — TELEPHONE ENCOUNTER
[facility] Call Center    Phone Message    May a detailed message be left on voicemail: yes     Reason for Call: Other: Pt called in and wanted to schedule an appointment for an evaluation for Bariatric surgery. Pleae follow up when available. Thank you      Action Taken: Message routed to:  [facility] ([facility] Gastro    Travel Screening: Not Applicable

## 2021-01-06 NOTE — PATIENT INSTRUCTIONS
"GOALS:  Relating To Eating:  Eat slowly (20-30 minutes per meal), chewing foods well (25 chews per bite/applesauce consistency)  9\" Plate method (1/2 plate non-starchy vegetables/fruit, 1/4 plate lean protein, 1/4 plate whole grain starch - no more than 1/2 cup carb/meal)    Relating to beverages:  Reduce caffeine/carbonation/calorie containing beverages  Separate fluids from meals by 30 minutes before, during, and after eating    Relating to dietary supplements:  Start a multivitamin containing iron daily    Relating to activity:  Increase activity as able     Initial Consult / Weight Loss    My Plate Planner_English - Pt Education  [email or web address]    Protein Sources for Weight Loss  [email or web address]     Carbohydrates  [email or web address]     Follow up with RD in one month    Lynn Iverson, MS, RD, LD  If you need to schedule or reschedule with a dietitian please call [phone].  "

## 2021-01-11 ENCOUNTER — TELEPHONE (OUTPATIENT)
Dept: ENDOCRINOLOGY | Facility: CLINIC | Age: 26
End: 2021-01-11

## 2021-01-11 NOTE — TELEPHONE ENCOUNTER
LVM for patient.  Per Jade, schedule a new virtual visit with Dr. Thien Cuevas for a bariatric meet and greet appointment.  [employer] message sent.

## 2021-01-15 ENCOUNTER — TELEPHONE (OUTPATIENT)
Dept: SURGERY | Facility: CLINIC | Age: 26
End: 2021-01-15

## 2021-01-15 ENCOUNTER — HEALTH MAINTENANCE LETTER (OUTPATIENT)
Age: 26
End: 2021-01-15

## 2021-01-15 NOTE — TELEPHONE ENCOUNTER
LVM for patient.  Per Jade, schedule a new virtual visit with Dr. CALI Cuevas for a bariatric meet and greet appointment.  [employer] message and letter sent.

## 2021-01-29 ENCOUNTER — TELEPHONE (OUTPATIENT)
Dept: ENDOCRINOLOGY | Facility: CLINIC | Age: 26
End: 2021-01-29

## 2021-01-29 NOTE — TELEPHONE ENCOUNTER
Spoke with Tiffanie and she will call back to schedule next available video return visit with Lynn Iverson for a follow-up and a video return visit with Sadia Soares on around 2/16/21 for a follow-up as well.

## 2021-02-03 ENCOUNTER — OFFICE VISIT (OUTPATIENT)
Dept: FAMILY MEDICINE | Facility: CLINIC | Age: 26
End: 2021-02-03
Payer: COMMERCIAL

## 2021-02-03 VITALS
OXYGEN SATURATION: 96 % | RESPIRATION RATE: 18 BRPM | SYSTOLIC BLOOD PRESSURE: 124 MMHG | WEIGHT: 280.4 LBS | HEART RATE: 92 BPM | DIASTOLIC BLOOD PRESSURE: 85 MMHG | HEIGHT: 67 IN | BODY MASS INDEX: 44.01 KG/M2 | TEMPERATURE: 98.5 F

## 2021-02-03 DIAGNOSIS — Z02.89 ENCOUNTER FOR COMPLETION OF FORM WITH PATIENT: ICD-10-CM

## 2021-02-03 DIAGNOSIS — Z91.410 HISTORY OF ADULT PHYSICAL AND SEXUAL ABUSE: ICD-10-CM

## 2021-02-03 DIAGNOSIS — R87.612 LOW GRADE SQUAMOUS INTRAEPITHELIAL LESION ON CYTOLOGIC SMEAR OF CERVIX (LGSIL): ICD-10-CM

## 2021-02-03 DIAGNOSIS — Z00.00 ROUTINE GENERAL MEDICAL EXAMINATION AT A HEALTH CARE FACILITY: Primary | ICD-10-CM

## 2021-02-03 DIAGNOSIS — Z13.9 SCREENING FOR CONDITION: ICD-10-CM

## 2021-02-03 DIAGNOSIS — Z00.01 ENCOUNTER FOR ROUTINE ADULT MEDICAL EXAM WITH ABNORMAL FINDINGS: ICD-10-CM

## 2021-02-03 LAB — HCG UR QL: NEGATIVE

## 2021-02-03 PROCEDURE — 81025 URINE PREGNANCY TEST: CPT | Performed by: STUDENT IN AN ORGANIZED HEALTH CARE EDUCATION/TRAINING PROGRAM

## 2021-02-03 PROCEDURE — 86481 TB AG RESPONSE T-CELL SUSP: CPT | Performed by: STUDENT IN AN ORGANIZED HEALTH CARE EDUCATION/TRAINING PROGRAM

## 2021-02-03 PROCEDURE — 86706 HEP B SURFACE ANTIBODY: CPT | Performed by: STUDENT IN AN ORGANIZED HEALTH CARE EDUCATION/TRAINING PROGRAM

## 2021-02-03 PROCEDURE — 36415 COLL VENOUS BLD VENIPUNCTURE: CPT | Performed by: STUDENT IN AN ORGANIZED HEALTH CARE EDUCATION/TRAINING PROGRAM

## 2021-02-03 PROCEDURE — 86709 HEPATITIS A IGM ANTIBODY: CPT | Performed by: STUDENT IN AN ORGANIZED HEALTH CARE EDUCATION/TRAINING PROGRAM

## 2021-02-03 PROCEDURE — 86708 HEPATITIS A ANTIBODY: CPT | Performed by: STUDENT IN AN ORGANIZED HEALTH CARE EDUCATION/TRAINING PROGRAM

## 2021-02-03 PROCEDURE — 86803 HEPATITIS C AB TEST: CPT | Performed by: STUDENT IN AN ORGANIZED HEALTH CARE EDUCATION/TRAINING PROGRAM

## 2021-02-03 PROCEDURE — 87340 HEPATITIS B SURFACE AG IA: CPT | Performed by: STUDENT IN AN ORGANIZED HEALTH CARE EDUCATION/TRAINING PROGRAM

## 2021-02-03 PROCEDURE — 87389 HIV-1 AG W/HIV-1&-2 AB AG IA: CPT | Performed by: STUDENT IN AN ORGANIZED HEALTH CARE EDUCATION/TRAINING PROGRAM

## 2021-02-03 PROCEDURE — 99395 PREV VISIT EST AGE 18-39: CPT | Mod: GC | Performed by: STUDENT IN AN ORGANIZED HEALTH CARE EDUCATION/TRAINING PROGRAM

## 2021-02-03 ASSESSMENT — ANXIETY QUESTIONNAIRES
IF YOU CHECKED OFF ANY PROBLEMS ON THIS QUESTIONNAIRE, HOW DIFFICULT HAVE THESE PROBLEMS MADE IT FOR YOU TO DO YOUR WORK, TAKE CARE OF THINGS AT HOME, OR GET ALONG WITH OTHER PEOPLE: NOT DIFFICULT AT ALL
GAD7 TOTAL SCORE: 0
2. NOT BEING ABLE TO STOP OR CONTROL WORRYING: NOT AT ALL
3. WORRYING TOO MUCH ABOUT DIFFERENT THINGS: NOT AT ALL
6. BECOMING EASILY ANNOYED OR IRRITABLE: NOT AT ALL
7. FEELING AFRAID AS IF SOMETHING AWFUL MIGHT HAPPEN: NOT AT ALL
1. FEELING NERVOUS, ANXIOUS, OR ON EDGE: NOT AT ALL
5. BEING SO RESTLESS THAT IT IS HARD TO SIT STILL: NOT AT ALL

## 2021-02-03 ASSESSMENT — PATIENT HEALTH QUESTIONNAIRE - PHQ9
5. POOR APPETITE OR OVEREATING: NOT AT ALL
SUM OF ALL RESPONSES TO PHQ QUESTIONS 1-9: 0

## 2021-02-03 ASSESSMENT — MIFFLIN-ST. JEOR: SCORE: 2059.52

## 2021-02-03 NOTE — PATIENT INSTRUCTIONS
Patient Education   Here is the plan from today's visit    1. Routine general medical examination at a health care facility  - HIV Antigen Antibody Combo  - Hepatitis A Antibody IgG  - Hepatitis B Surface Antibody  - Hepatitis B surface antigen  - Hepatitis C antibody  - Hepatitis A antibody IgM  - Quantiferon TB Gold Plus  - HCG Qualitative Urine (UPT) ([facility])    2. Low grade squamous intraepithelial lesion on cytologic smear of cervix (LGSIL)  - OB/GYN REFERRAL - INTERNAL    Lab Testing:  We will fax results to [facility]    Scheduling:  If you have any concerns about today's visit or wish to schedule another appointment please call our office during normal business hours [phone] (8-5:00 M-F)  If a referral was made to a [facility] and you don't get a call from central scheduling please call [phone].  Medical Concerns:  If you have urgent medical concerns please call [phone] at any time of the day.    Caterina Selby MD       Preventive Health Recommendations  Female Ages 21 to 25     Yearly exam:     See your health care provider every year in order to  o Review health changes.   o Discuss preventive care.    o Review your medicines if your doctor has prescribed any.      You should be tested each year for STDs (sexually transmitted diseases).       Talk to your provider about how often you should have cholesterol testing.      Get a Pap test every three years. If you have an abnormal result, your doctor may have you test more often.      If you are at risk for diabetes, you should have a diabetes test (fasting glucose).     Shots:     Get a flu shot each year.     Get a tetanus shot every 10 years.     Consider getting the shot (vaccine) that prevents cervical cancer (Gardasil).    Nutrition:     Eat at least 5 servings of fruits and vegetables each day.    Eat whole-grain bread, whole-wheat pasta and brown rice instead of white grains and  rice.    Get adequate Calcium and Vitamin D.     Lifestyle    Exercise at least 150 minutes a week each week (30 minutes a day, 5 days a week). This will help you control your weight and prevent disease.    Limit alcohol to one drink per day.    No smoking.     Wear sunscreen to prevent skin cancer.    See your dentist every six months for an exam and cleaning.

## 2021-02-03 NOTE — PROGRESS NOTES
Preceptor Attestation:  Patient seen and evaluated in person. I discussed the patient with the resident. I have verified the content of the note, which accurately reflects my assessment of the patient and the plan of care.   Supervising Physician:  Walter Mahmood DO.
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
## 2021-02-04 LAB
HAV IGG SER QL IA: REACTIVE
HAV IGM SERPL QL IA: NONREACTIVE
HBV SURFACE AB SERPL IA-ACNC: 19.69 M[IU]/ML
HBV SURFACE AG SERPL QL IA: NONREACTIVE
HCV AB SERPL QL IA: NONREACTIVE
HIV 1+2 AB+HIV1 P24 AG SERPL QL IA: NONREACTIVE

## 2021-02-04 ASSESSMENT — ANXIETY QUESTIONNAIRES: GAD7 TOTAL SCORE: 0

## 2021-02-05 LAB
GAMMA INTERFERON BACKGROUND BLD IA-ACNC: 0.03 IU/ML
M TB IFN-G CD4+ BCKGRND COR BLD-ACNC: 9.97 IU/ML
M TB TUBERC IFN-G BLD QL: NEGATIVE
MITOGEN IGNF BCKGRD COR BLD-ACNC: 0.02 IU/ML
MITOGEN IGNF BCKGRD COR BLD-ACNC: 0.03 IU/ML

## 2021-02-25 NOTE — PROGRESS NOTES
Monica is a 25 year old who is being evaluated via a billable telephone visit.      Assessment & Plan     Seasonal allergic rhinitis due to pollen  Patient with chronic history of seasonal allergies previously well controlled with cetirizine and fluticasone year round, has had exacerbation of symptoms since running out her medication 2 weeks ago. Refills provided. Does also have a history of related sinus infections, no symptoms of that at this time, though did encourage patient to schedule another appointment if her symptoms do not improve with antihistamines.   - cetirizine (ZYRTEC) 10 MG tablet; Take 1 tablet (10 mg) by mouth daily  - fluticasone (FLONASE) 50 MCG/ACT nasal spray; Spray 2 sprays into both nostrils daily      No follow-ups on file.    Caterina Selby MD  [facility]    Marisol Anderson is a 25 year old who presents for the following health issues     HPI     Medication follow up    History of seasonal allergies- typically takes cetirizine and Flonase year round, ran out weeks ago and has noticed rhinorrhea, congestion   Prior to starting antihistamines she used to get sinus infections, no longer has had issues with these since starting meds  Needs refill sent to [facility], previously sent to [facility]   No fevers/chills, facial pain or headache, chest pain or shortness of breath  No known COVID19+ contacts     Review of Systems   10 point ROS neg other than the symptoms noted above in the HPI.       Objective         Vitals:  No vitals were obtained today due to virtual visit.    Physical Exam   healthy, alert and no distress  PSYCH: Alert; coherent speech, normal rate and volume, able to articulate logical thoughts, able to abstract reason, no tangential thoughts.  RESP: No cough, no audible wheezing, able to talk in full sentences  Remainder of exam unable to be completed due to telephone visits        Phone call duration: 12 minutes    Patient discussed with   Jose A.

## 2021-02-26 ENCOUNTER — VIRTUAL VISIT (OUTPATIENT)
Dept: FAMILY MEDICINE | Facility: CLINIC | Age: 26
End: 2021-02-26
Payer: COMMERCIAL

## 2021-02-26 DIAGNOSIS — J30.1 SEASONAL ALLERGIC RHINITIS DUE TO POLLEN: ICD-10-CM

## 2021-02-26 PROCEDURE — 99213 OFFICE O/P EST LOW 20 MIN: CPT | Mod: 95 | Performed by: STUDENT IN AN ORGANIZED HEALTH CARE EDUCATION/TRAINING PROGRAM

## 2021-02-26 RX ORDER — CETIRIZINE HYDROCHLORIDE 10 MG/1
10 TABLET ORAL DAILY
Qty: 90 TABLET | Refills: 3 | Status: ON HOLD | OUTPATIENT
Start: 2021-02-26 | End: 2022-07-14

## 2021-02-26 RX ORDER — FLUTICASONE PROPIONATE 50 MCG
2 SPRAY, SUSPENSION (ML) NASAL DAILY
Qty: 16 G | Refills: 2 | Status: SHIPPED | OUTPATIENT
Start: 2021-02-26 | End: 2021-07-05

## 2021-03-03 ENCOUNTER — OFFICE VISIT (OUTPATIENT)
Dept: OBGYN | Facility: CLINIC | Age: 26
End: 2021-03-03
Payer: COMMERCIAL

## 2021-03-03 VITALS
DIASTOLIC BLOOD PRESSURE: 82 MMHG | BODY MASS INDEX: 43.95 KG/M2 | OXYGEN SATURATION: 96 % | HEIGHT: 67 IN | SYSTOLIC BLOOD PRESSURE: 134 MMHG | WEIGHT: 280 LBS | HEART RATE: 95 BPM

## 2021-03-03 DIAGNOSIS — Z12.4 SCREENING FOR CERVICAL CANCER: Primary | ICD-10-CM

## 2021-03-03 PROCEDURE — 88175 CYTOPATH C/V AUTO FLUID REDO: CPT | Performed by: OBSTETRICS & GYNECOLOGY

## 2021-03-03 PROCEDURE — 88141 CYTOPATH C/V INTERPRET: CPT | Performed by: PATHOLOGY

## 2021-03-03 PROCEDURE — 99202 OFFICE O/P NEW SF 15 MIN: CPT | Performed by: OBSTETRICS & GYNECOLOGY

## 2021-03-03 ASSESSMENT — MIFFLIN-ST. JEOR: SCORE: 2057.7

## 2021-03-03 NOTE — PROGRESS NOTES
"Monica Morales is a 25 year old year old woman who presents for pap smear.  Prior Pap was LSIL.  She is currently enrolled in [facility].  Reports no sexual activity for the past 2 years, when she was sexually active had some vaginal dryness, was able to be orgasmic.  We discussed use of lubricants with intercourse.  Declines STD testing as this was done prior to cessation sexual activity.   She describes no abnormal bleeding, abnormal vaginal discharge, pelvic pain, GI symptoms.      OBJECTIVE: Healthy female in NAD.  /82   Pulse 95   Ht 1.702 m (5' 7\")   Wt 127 kg (280 lb)   LMP 02/07/2021   SpO2 96%   BMI 43.85 kg/m       Abdomen soft, non-tender, without mass.      Pelvic exam:  External genitalia appeared normal without lesion.  Urethral meatus, perineum, and anus appeared normal. Cervix and vagina appeared normal, without lesion.  Pap was sent.  Bimanual examination revealed no pelvic masses nor tenderness.    ASSESSMENT: LSIL Pap.  Normal pelvic exam.      PLAN:  When Pap results are known will reassess per guidelines.   "

## 2021-03-08 LAB
COPATH REPORT: ABNORMAL
PAP: ABNORMAL

## 2021-03-09 ENCOUNTER — PATIENT OUTREACH (OUTPATIENT)
Dept: OBGYN | Facility: CLINIC | Age: 26
End: 2021-03-09

## 2021-03-09 PROBLEM — R87.619 ABNORMAL PAP SMEAR OF CERVIX: Status: ACTIVE | Noted: 2019-05-16

## 2021-07-05 DIAGNOSIS — J30.1 SEASONAL ALLERGIC RHINITIS DUE TO POLLEN: ICD-10-CM

## 2021-07-05 RX ORDER — FLUTICASONE PROPIONATE 50 MCG
2 SPRAY, SUSPENSION (ML) NASAL DAILY
Qty: 16 G | Refills: 2 | Status: ON HOLD | OUTPATIENT
Start: 2021-07-05 | End: 2022-07-14

## 2021-07-05 NOTE — TELEPHONE ENCOUNTER
Fluticasone Rx sent. Patient had virtual visit 2/26/2021 for follow up of chronic seasonal allergies, well controlled with cetirizine and fluticasone year round. Has flare of symptoms when she has run out of meds in the past.     Caterina Selby MD

## 2021-07-05 NOTE — TELEPHONE ENCOUNTER
"Request for medication refill:  fluticasone (FLONASE) 50 MCG/ACT nasal spray    Providers if patient needs an appointment and you are willing to give a one month supply please refill for one month and  send a letter/MyChart using \".SMILLIMITEDREFILL\" .smillimited and route chart to \"P SMI \" (Giving one month refill in non controlled medications is strongly recommended before denial)    If refill has been denied, meaning absolutely no refills without visit, please complete the smart phrase \".smirxrefuse\" and route it to the \"P SMI MED REFILLS\"  pool to inform the patient and the pharmacy.    Kate Madden MA        "

## 2021-09-04 ENCOUNTER — HEALTH MAINTENANCE LETTER (OUTPATIENT)
Age: 26
End: 2021-09-04

## 2022-02-17 ENCOUNTER — PATIENT OUTREACH (OUTPATIENT)
Dept: OBGYN | Facility: CLINIC | Age: 27
End: 2022-02-17
Payer: COMMERCIAL

## 2022-02-17 PROBLEM — R87.612 PAPANICOLAOU SMEAR OF CERVIX WITH LOW GRADE SQUAMOUS INTRAEPITHELIAL LESION (LGSIL): Status: ACTIVE | Noted: 2019-05-16

## 2022-02-17 NOTE — LETTER
February 17, 2022      Monica Morales  [address] [facility] [address]        Dear ,    This letter is to remind you that you are due for your follow-up Pap smear.    Please call [phone]  and press 1 to schedule your appointment at your earliest convenience.    If you have completed the appointment outside of the [facility] system, please have the records forwarded to our office. We will update your chart for your provider to review before your next annual wellness visit.     Thank you for choosing [facility]!      Sincerely,    Your [facility] Care Team

## 2022-04-14 NOTE — TELEPHONE ENCOUNTER
FYI to provider - Patient is lost to pap tracking follow-up. Attempts to contact pt have been made per reminder process and there has been no reply and/or no appt scheduled. Contact hx listed below.     5/10/19 LSIL Pap Plan repeat cytology in one year  03/3/21 ASCUS Pap. Plan Pap in 1 year due 03/3/22.  Results and recommendations released to the pt through mychart and Letter sent.   02/17/22 Reminder Mychart, Letter  3/15/22 Reminder call - LM  4/14/22 Lost to follow-up for pap tracking

## 2022-04-16 ENCOUNTER — HEALTH MAINTENANCE LETTER (OUTPATIENT)
Age: 27
End: 2022-04-16

## 2022-06-29 ENCOUNTER — APPOINTMENT (OUTPATIENT)
Dept: CT IMAGING | Facility: CLINIC | Age: 27
End: 2022-06-29
Attending: EMERGENCY MEDICINE
Payer: COMMERCIAL

## 2022-06-29 ENCOUNTER — ANESTHESIA (OUTPATIENT)
Dept: EMERGENCY MEDICINE | Facility: CLINIC | Age: 27
End: 2022-06-29
Payer: COMMERCIAL

## 2022-06-29 ENCOUNTER — HOSPITAL ENCOUNTER (INPATIENT)
Facility: CLINIC | Age: 27
LOS: 7 days | Discharge: ANOTHER HOSPITAL | End: 2022-07-06
Attending: EMERGENCY MEDICINE | Admitting: INTERNAL MEDICINE
Payer: COMMERCIAL

## 2022-06-29 ENCOUNTER — ANESTHESIA EVENT (OUTPATIENT)
Dept: EMERGENCY MEDICINE | Facility: CLINIC | Age: 27
End: 2022-06-29
Payer: COMMERCIAL

## 2022-06-29 ENCOUNTER — APPOINTMENT (OUTPATIENT)
Dept: GENERAL RADIOLOGY | Facility: CLINIC | Age: 27
End: 2022-06-29
Attending: EMERGENCY MEDICINE
Payer: COMMERCIAL

## 2022-06-29 DIAGNOSIS — T50.904A DRUG OVERDOSE, UNDETERMINED INTENT, INITIAL ENCOUNTER: ICD-10-CM

## 2022-06-29 DIAGNOSIS — Z11.52 ENCOUNTER FOR SCREENING LABORATORY TESTING FOR SEVERE ACUTE RESPIRATORY SYNDROME CORONAVIRUS 2 (SARS-COV-2): ICD-10-CM

## 2022-06-29 LAB
ALBUMIN SERPL-MCNC: 3.5 G/DL (ref 3.4–5)
ALBUMIN UR-MCNC: 30 MG/DL
ALP SERPL-CCNC: 93 U/L (ref 40–150)
ALT SERPL W P-5'-P-CCNC: 58 U/L (ref 0–50)
AMPHETAMINES UR QL SCN: ABNORMAL
ANION GAP SERPL CALCULATED.3IONS-SCNC: 17 MMOL/L (ref 3–14)
ANION GAP SERPL CALCULATED.3IONS-SCNC: 17 MMOL/L (ref 3–14)
APAP SERPL-MCNC: <2 MG/L (ref 10–30)
APPEARANCE UR: CLEAR
AST SERPL W P-5'-P-CCNC: 42 U/L (ref 0–45)
BARBITURATES UR QL: ABNORMAL
BASOPHILS # BLD AUTO: 0.1 10E3/UL (ref 0–0.2)
BASOPHILS NFR BLD AUTO: 0 %
BENZODIAZ UR QL: ABNORMAL
BILIRUB SERPL-MCNC: 0.3 MG/DL (ref 0.2–1.3)
BILIRUB UR QL STRIP: NEGATIVE
BUN SERPL-MCNC: 7 MG/DL (ref 7–30)
BUN SERPL-MCNC: 7 MG/DL (ref 7–30)
CALCIUM SERPL-MCNC: 8.2 MG/DL (ref 8.5–10.1)
CALCIUM SERPL-MCNC: 8.2 MG/DL (ref 8.5–10.1)
CANNABINOIDS UR QL SCN: ABNORMAL
CHLORIDE BLD-SCNC: 109 MMOL/L (ref 94–109)
CHLORIDE BLD-SCNC: 109 MMOL/L (ref 94–109)
CO2 SERPL-SCNC: 16 MMOL/L (ref 20–32)
CO2 SERPL-SCNC: 16 MMOL/L (ref 20–32)
COCAINE UR QL: ABNORMAL
COLOR UR AUTO: YELLOW
CREAT SERPL-MCNC: 0.99 MG/DL (ref 0.52–1.04)
CREAT SERPL-MCNC: 0.99 MG/DL (ref 0.52–1.04)
EOSINOPHIL # BLD AUTO: 0.2 10E3/UL (ref 0–0.7)
EOSINOPHIL NFR BLD AUTO: 1 %
ERYTHROCYTE [DISTWIDTH] IN BLOOD BY AUTOMATED COUNT: 12.4 % (ref 10–15)
ETHANOL SERPL-MCNC: 0.01 G/DL
GFR SERPL CREATININE-BSD FRML MDRD: 81 ML/MIN/1.73M2
GFR SERPL CREATININE-BSD FRML MDRD: 81 ML/MIN/1.73M2
GLUCOSE BLD-MCNC: 150 MG/DL (ref 70–99)
GLUCOSE BLD-MCNC: 150 MG/DL (ref 70–99)
GLUCOSE BLDC GLUCOMTR-MCNC: 148 MG/DL (ref 70–99)
GLUCOSE UR STRIP-MCNC: NEGATIVE MG/DL
HCG SERPL QL: NEGATIVE
HCT VFR BLD AUTO: 46.6 % (ref 35–47)
HGB BLD-MCNC: 15.2 G/DL (ref 11.7–15.7)
HGB UR QL STRIP: NEGATIVE
HOLD SPECIMEN: NORMAL
HYALINE CASTS: 3 /LPF
IMM GRANULOCYTES # BLD: 0.3 10E3/UL
IMM GRANULOCYTES NFR BLD: 1 %
KETONES UR STRIP-MCNC: 5 MG/DL
LACTATE SERPL-SCNC: 4.1 MMOL/L (ref 0.7–2)
LEUKOCYTE ESTERASE UR QL STRIP: NEGATIVE
LYMPHOCYTES # BLD AUTO: 4.9 10E3/UL (ref 0.8–5.3)
LYMPHOCYTES NFR BLD AUTO: 24 %
MCH RBC QN AUTO: 29.9 PG (ref 26.5–33)
MCHC RBC AUTO-ENTMCNC: 32.6 G/DL (ref 31.5–36.5)
MCV RBC AUTO: 92 FL (ref 78–100)
MONOCYTES # BLD AUTO: 1.5 10E3/UL (ref 0–1.3)
MONOCYTES NFR BLD AUTO: 7 %
MUCOUS THREADS #/AREA URNS LPF: PRESENT /LPF
NEUTROPHILS # BLD AUTO: 13.8 10E3/UL (ref 1.6–8.3)
NEUTROPHILS NFR BLD AUTO: 67 %
NITRATE UR QL: NEGATIVE
NRBC # BLD AUTO: 0 10E3/UL
NRBC BLD AUTO-RTO: 0 /100
OPIATES UR QL SCN: ABNORMAL
PCP UR QL SCN: ABNORMAL
PH UR STRIP: 5 [PH] (ref 5–7)
PLATELET # BLD AUTO: 330 10E3/UL (ref 150–450)
POTASSIUM BLD-SCNC: 3.1 MMOL/L (ref 3.4–5.3)
POTASSIUM BLD-SCNC: 3.1 MMOL/L (ref 3.4–5.3)
PROT SERPL-MCNC: 6.6 G/DL (ref 6.8–8.8)
RBC # BLD AUTO: 5.08 10E6/UL (ref 3.8–5.2)
RBC URINE: 1 /HPF
SALICYLATES SERPL-MCNC: <2 MG/DL
SARS-COV-2 RNA RESP QL NAA+PROBE: NEGATIVE
SODIUM SERPL-SCNC: 142 MMOL/L (ref 133–144)
SODIUM SERPL-SCNC: 142 MMOL/L (ref 133–144)
SP GR UR STRIP: 1.02 (ref 1–1.03)
SQUAMOUS EPITHELIAL: <1 /HPF
TROPONIN I SERPL HS-MCNC: <3 NG/L
UROBILINOGEN UR STRIP-MCNC: NORMAL MG/DL
WBC # BLD AUTO: 20.7 10E3/UL (ref 4–11)
WBC URINE: 1 /HPF

## 2022-06-29 PROCEDURE — 36415 COLL VENOUS BLD VENIPUNCTURE: CPT | Performed by: EMERGENCY MEDICINE

## 2022-06-29 PROCEDURE — 82077 ASSAY SPEC XCP UR&BREATH IA: CPT | Performed by: EMERGENCY MEDICINE

## 2022-06-29 PROCEDURE — 250N000011 HC RX IP 250 OP 636

## 2022-06-29 PROCEDURE — 258N000003 HC RX IP 258 OP 636: Performed by: EMERGENCY MEDICINE

## 2022-06-29 PROCEDURE — 96365 THER/PROPH/DIAG IV INF INIT: CPT | Mod: 59 | Performed by: EMERGENCY MEDICINE

## 2022-06-29 PROCEDURE — 31500 INSERT EMERGENCY AIRWAY: CPT

## 2022-06-29 PROCEDURE — 93010 ELECTROCARDIOGRAM REPORT: CPT | Performed by: EMERGENCY MEDICINE

## 2022-06-29 PROCEDURE — 999N000123 HC STATISTIC OXYGEN O2DAILY TECH TIME

## 2022-06-29 PROCEDURE — 72125 CT NECK SPINE W/O DYE: CPT

## 2022-06-29 PROCEDURE — 999N000159 HC STATISTIC RCP TIME PACU VENT EA 10 MIN

## 2022-06-29 PROCEDURE — 200N000001 HC R&B ICU

## 2022-06-29 PROCEDURE — 250N000011 HC RX IP 250 OP 636: Performed by: NURSE ANESTHETIST, CERTIFIED REGISTERED

## 2022-06-29 PROCEDURE — 80307 DRUG TEST PRSMV CHEM ANLYZR: CPT | Performed by: EMERGENCY MEDICINE

## 2022-06-29 PROCEDURE — 80143 DRUG ASSAY ACETAMINOPHEN: CPT | Performed by: EMERGENCY MEDICINE

## 2022-06-29 PROCEDURE — 250N000009 HC RX 250: Performed by: NURSE ANESTHETIST, CERTIFIED REGISTERED

## 2022-06-29 PROCEDURE — 80179 DRUG ASSAY SALICYLATE: CPT | Performed by: EMERGENCY MEDICINE

## 2022-06-29 PROCEDURE — 80053 COMPREHEN METABOLIC PANEL: CPT | Performed by: EMERGENCY MEDICINE

## 2022-06-29 PROCEDURE — 83605 ASSAY OF LACTIC ACID: CPT | Performed by: EMERGENCY MEDICINE

## 2022-06-29 PROCEDURE — 31500 INSERT EMERGENCY AIRWAY: CPT | Performed by: EMERGENCY MEDICINE

## 2022-06-29 PROCEDURE — 5A1935Z RESPIRATORY VENTILATION, LESS THAN 24 CONSECUTIVE HOURS: ICD-10-PCS | Performed by: EMERGENCY MEDICINE

## 2022-06-29 PROCEDURE — C9803 HOPD COVID-19 SPEC COLLECT: HCPCS | Performed by: EMERGENCY MEDICINE

## 2022-06-29 PROCEDURE — 94002 VENT MGMT INPAT INIT DAY: CPT

## 2022-06-29 PROCEDURE — 999N000158 HC STATISTIC RCP TIME ED VENT EA 10 MIN

## 2022-06-29 PROCEDURE — 250N000011 HC RX IP 250 OP 636: Performed by: EMERGENCY MEDICINE

## 2022-06-29 PROCEDURE — 85025 COMPLETE CBC W/AUTO DIFF WBC: CPT | Performed by: EMERGENCY MEDICINE

## 2022-06-29 PROCEDURE — 70450 CT HEAD/BRAIN W/O DYE: CPT

## 2022-06-29 PROCEDURE — 84484 ASSAY OF TROPONIN QUANT: CPT | Performed by: EMERGENCY MEDICINE

## 2022-06-29 PROCEDURE — 99292 CRITICAL CARE ADDL 30 MIN: CPT | Mod: 25 | Performed by: EMERGENCY MEDICINE

## 2022-06-29 PROCEDURE — 96375 TX/PRO/DX INJ NEW DRUG ADDON: CPT | Performed by: EMERGENCY MEDICINE

## 2022-06-29 PROCEDURE — 99291 CRITICAL CARE FIRST HOUR: CPT | Mod: 25 | Performed by: EMERGENCY MEDICINE

## 2022-06-29 PROCEDURE — 999N000157 HC STATISTIC RCP TIME EA 10 MIN

## 2022-06-29 PROCEDURE — 93005 ELECTROCARDIOGRAM TRACING: CPT | Performed by: EMERGENCY MEDICINE

## 2022-06-29 PROCEDURE — 96361 HYDRATE IV INFUSION ADD-ON: CPT | Performed by: EMERGENCY MEDICINE

## 2022-06-29 PROCEDURE — 96360 HYDRATION IV INFUSION INIT: CPT | Mod: 59 | Performed by: EMERGENCY MEDICINE

## 2022-06-29 PROCEDURE — 87040 BLOOD CULTURE FOR BACTERIA: CPT | Performed by: EMERGENCY MEDICINE

## 2022-06-29 PROCEDURE — 370N000003 HC ANESTHESIA WARD SERVICE

## 2022-06-29 PROCEDURE — 87635 SARS-COV-2 COVID-19 AMP PRB: CPT | Performed by: EMERGENCY MEDICINE

## 2022-06-29 PROCEDURE — 82803 BLOOD GASES ANY COMBINATION: CPT | Performed by: EMERGENCY MEDICINE

## 2022-06-29 PROCEDURE — 87086 URINE CULTURE/COLONY COUNT: CPT | Performed by: EMERGENCY MEDICINE

## 2022-06-29 PROCEDURE — 81001 URINALYSIS AUTO W/SCOPE: CPT | Performed by: EMERGENCY MEDICINE

## 2022-06-29 PROCEDURE — 84703 CHORIONIC GONADOTROPIN ASSAY: CPT | Performed by: EMERGENCY MEDICINE

## 2022-06-29 PROCEDURE — 999N000065 XR CHEST PORT 1 VIEW

## 2022-06-29 PROCEDURE — 999N000178 HC STATISTIC SUCTION SPUTUM

## 2022-06-29 RX ORDER — SODIUM CHLORIDE 9 MG/ML
INJECTION, SOLUTION INTRAVENOUS CONTINUOUS
Status: DISCONTINUED | OUTPATIENT
Start: 2022-06-29 | End: 2022-06-30

## 2022-06-29 RX ORDER — BISACODYL 5 MG
5 TABLET, DELAYED RELEASE (ENTERIC COATED) ORAL DAILY PRN
Status: DISCONTINUED | OUTPATIENT
Start: 2022-06-29 | End: 2022-07-06 | Stop reason: HOSPADM

## 2022-06-29 RX ORDER — DEXTROSE MONOHYDRATE 25 G/50ML
25-50 INJECTION, SOLUTION INTRAVENOUS
Status: DISCONTINUED | OUTPATIENT
Start: 2022-06-29 | End: 2022-07-06 | Stop reason: HOSPADM

## 2022-06-29 RX ORDER — LORAZEPAM 2 MG/ML
INJECTION INTRAMUSCULAR
Status: DISCONTINUED
Start: 2022-06-29 | End: 2022-06-30 | Stop reason: WASHOUT

## 2022-06-29 RX ORDER — ONDANSETRON 2 MG/ML
4 INJECTION INTRAMUSCULAR; INTRAVENOUS EVERY 6 HOURS PRN
Status: DISCONTINUED | OUTPATIENT
Start: 2022-06-29 | End: 2022-07-06 | Stop reason: HOSPADM

## 2022-06-29 RX ORDER — PROPOFOL 10 MG/ML
INJECTION, EMULSION INTRAVENOUS PRN
Status: DISCONTINUED | OUTPATIENT
Start: 2022-06-29 | End: 2022-06-29

## 2022-06-29 RX ORDER — ONDANSETRON 4 MG/1
4 TABLET, ORALLY DISINTEGRATING ORAL EVERY 6 HOURS PRN
Status: DISCONTINUED | OUTPATIENT
Start: 2022-06-29 | End: 2022-07-06 | Stop reason: HOSPADM

## 2022-06-29 RX ORDER — LORAZEPAM 2 MG/ML
1 INJECTION INTRAMUSCULAR ONCE
Status: COMPLETED | OUTPATIENT
Start: 2022-06-29 | End: 2022-06-29

## 2022-06-29 RX ORDER — PROCHLORPERAZINE MALEATE 10 MG
10 TABLET ORAL EVERY 6 HOURS PRN
Status: DISCONTINUED | OUTPATIENT
Start: 2022-06-29 | End: 2022-07-06 | Stop reason: HOSPADM

## 2022-06-29 RX ORDER — LORAZEPAM 2 MG/ML
INJECTION INTRAMUSCULAR
Status: DISCONTINUED
Start: 2022-06-29 | End: 2022-06-29

## 2022-06-29 RX ORDER — BISACODYL 5 MG
15 TABLET, DELAYED RELEASE (ENTERIC COATED) ORAL DAILY PRN
Status: DISCONTINUED | OUTPATIENT
Start: 2022-06-29 | End: 2022-07-06 | Stop reason: HOSPADM

## 2022-06-29 RX ORDER — NICOTINE POLACRILEX 4 MG
15-30 LOZENGE BUCCAL
Status: DISCONTINUED | OUTPATIENT
Start: 2022-06-29 | End: 2022-07-06 | Stop reason: HOSPADM

## 2022-06-29 RX ORDER — ENOXAPARIN SODIUM 100 MG/ML
40 INJECTION SUBCUTANEOUS EVERY 24 HOURS
Status: DISCONTINUED | OUTPATIENT
Start: 2022-06-29 | End: 2022-07-02

## 2022-06-29 RX ORDER — PROCHLORPERAZINE 25 MG
25 SUPPOSITORY, RECTAL RECTAL EVERY 12 HOURS PRN
Status: DISCONTINUED | OUTPATIENT
Start: 2022-06-29 | End: 2022-07-06 | Stop reason: HOSPADM

## 2022-06-29 RX ORDER — BISACODYL 5 MG
10 TABLET, DELAYED RELEASE (ENTERIC COATED) ORAL DAILY PRN
Status: DISCONTINUED | OUTPATIENT
Start: 2022-06-29 | End: 2022-07-06 | Stop reason: HOSPADM

## 2022-06-29 RX ORDER — CEFAZOLIN SODIUM 1 G/50ML
1250 SOLUTION INTRAVENOUS EVERY 12 HOURS
Status: DISCONTINUED | OUTPATIENT
Start: 2022-06-29 | End: 2022-06-30

## 2022-06-29 RX ADMIN — SODIUM CHLORIDE: 9 INJECTION, SOLUTION INTRAVENOUS at 23:28

## 2022-06-29 RX ADMIN — LORAZEPAM 1 MG: 2 INJECTION INTRAMUSCULAR at 21:56

## 2022-06-29 RX ADMIN — SODIUM CHLORIDE 1000 ML: 9 INJECTION, SOLUTION INTRAVENOUS at 22:30

## 2022-06-29 RX ADMIN — LORAZEPAM 1 MG: 2 INJECTION INTRAMUSCULAR; INTRAVENOUS at 21:56

## 2022-06-29 RX ADMIN — ROCURONIUM BROMIDE 50 MG: 50 INJECTION, SOLUTION INTRAVENOUS at 22:35

## 2022-06-29 RX ADMIN — VANCOMYCIN HYDROCHLORIDE 1250 MG: 10 INJECTION, POWDER, LYOPHILIZED, FOR SOLUTION INTRAVENOUS at 23:28

## 2022-06-29 RX ADMIN — PROPOFOL 250 MG: 10 INJECTION, EMULSION INTRAVENOUS at 22:30

## 2022-06-29 RX ADMIN — SUCCINYLCHOLINE CHLORIDE 180 MG: 20 INJECTION, SOLUTION INTRAMUSCULAR; INTRAVENOUS at 22:30

## 2022-06-29 RX ADMIN — SODIUM CHLORIDE 1000 ML: 9 INJECTION, SOLUTION INTRAVENOUS at 23:27

## 2022-06-29 RX ADMIN — SODIUM CHLORIDE 1000 ML: 9 INJECTION, SOLUTION INTRAVENOUS at 21:55

## 2022-06-30 LAB
ALBUMIN SERPL-MCNC: 3 G/DL (ref 3.4–5)
ALP SERPL-CCNC: 70 U/L (ref 40–150)
ALT SERPL W P-5'-P-CCNC: 47 U/L (ref 0–50)
ANION GAP SERPL CALCULATED.3IONS-SCNC: 10 MMOL/L (ref 3–14)
AST SERPL W P-5'-P-CCNC: 34 U/L (ref 0–45)
BASE EXCESS BLDV CALC-SCNC: -3.2 MMOL/L (ref -7.7–1.9)
BILIRUB SERPL-MCNC: 0.5 MG/DL (ref 0.2–1.3)
BUN SERPL-MCNC: 6 MG/DL (ref 7–30)
CALCIUM SERPL-MCNC: 7.5 MG/DL (ref 8.5–10.1)
CHLORIDE BLD-SCNC: 111 MMOL/L (ref 94–109)
CO2 SERPL-SCNC: 25 MMOL/L (ref 20–32)
CREAT SERPL-MCNC: 1.01 MG/DL (ref 0.52–1.04)
ERYTHROCYTE [DISTWIDTH] IN BLOOD BY AUTOMATED COUNT: 12.8 % (ref 10–15)
GFR SERPL CREATININE-BSD FRML MDRD: 79 ML/MIN/1.73M2
GLUCOSE BLD-MCNC: 97 MG/DL (ref 70–99)
GLUCOSE BLDC GLUCOMTR-MCNC: 101 MG/DL (ref 70–99)
GLUCOSE BLDC GLUCOMTR-MCNC: 110 MG/DL (ref 70–99)
GLUCOSE BLDC GLUCOMTR-MCNC: 118 MG/DL (ref 70–99)
GLUCOSE BLDC GLUCOMTR-MCNC: 149 MG/DL (ref 70–99)
GLUCOSE BLDC GLUCOMTR-MCNC: 89 MG/DL (ref 70–99)
HCO3 BLDV-SCNC: 24 MMOL/L (ref 21–28)
HCT VFR BLD AUTO: 38.6 % (ref 35–47)
HGB BLD-MCNC: 13.2 G/DL (ref 11.7–15.7)
HOLD SPECIMEN: NORMAL
LACTATE SERPL-SCNC: 1.6 MMOL/L (ref 0.7–2)
LACTATE SERPL-SCNC: 2.2 MMOL/L (ref 0.7–2)
MCH RBC QN AUTO: 29.9 PG (ref 26.5–33)
MCHC RBC AUTO-ENTMCNC: 34.2 G/DL (ref 31.5–36.5)
MCV RBC AUTO: 88 FL (ref 78–100)
O2/TOTAL GAS SETTING VFR VENT: 21 %
PCO2 BLDV: 48 MM HG (ref 40–50)
PH BLDV: 7.3 [PH] (ref 7.32–7.43)
PLATELET # BLD AUTO: 247 10E3/UL (ref 150–450)
PO2 BLDV: 62 MM HG (ref 25–47)
POTASSIUM BLD-SCNC: 3.3 MMOL/L (ref 3.4–5.3)
PROT SERPL-MCNC: 5.4 G/DL (ref 6.8–8.8)
RBC # BLD AUTO: 4.41 10E6/UL (ref 3.8–5.2)
SODIUM SERPL-SCNC: 146 MMOL/L (ref 133–144)
WBC # BLD AUTO: 17.6 10E3/UL (ref 4–11)

## 2022-06-30 PROCEDURE — 94003 VENT MGMT INPAT SUBQ DAY: CPT

## 2022-06-30 PROCEDURE — 36415 COLL VENOUS BLD VENIPUNCTURE: CPT | Performed by: EMERGENCY MEDICINE

## 2022-06-30 PROCEDURE — 83605 ASSAY OF LACTIC ACID: CPT | Performed by: EMERGENCY MEDICINE

## 2022-06-30 PROCEDURE — 258N000003 HC RX IP 258 OP 636: Performed by: EMERGENCY MEDICINE

## 2022-06-30 PROCEDURE — 999N000157 HC STATISTIC RCP TIME EA 10 MIN

## 2022-06-30 PROCEDURE — C9113 INJ PANTOPRAZOLE SODIUM, VIA: HCPCS | Performed by: INTERNAL MEDICINE

## 2022-06-30 PROCEDURE — 250N000011 HC RX IP 250 OP 636: Performed by: EMERGENCY MEDICINE

## 2022-06-30 PROCEDURE — 80053 COMPREHEN METABOLIC PANEL: CPT | Performed by: INTERNAL MEDICINE

## 2022-06-30 PROCEDURE — 250N000011 HC RX IP 250 OP 636: Performed by: INTERNAL MEDICINE

## 2022-06-30 PROCEDURE — 99233 SBSQ HOSP IP/OBS HIGH 50: CPT

## 2022-06-30 PROCEDURE — 120N000004 HC R&B MS OVERFLOW

## 2022-06-30 PROCEDURE — 85027 COMPLETE CBC AUTOMATED: CPT | Performed by: INTERNAL MEDICINE

## 2022-06-30 PROCEDURE — 83605 ASSAY OF LACTIC ACID: CPT | Performed by: INTERNAL MEDICINE

## 2022-06-30 PROCEDURE — 94002 VENT MGMT INPAT INIT DAY: CPT

## 2022-06-30 PROCEDURE — 250N000013 HC RX MED GY IP 250 OP 250 PS 637

## 2022-06-30 PROCEDURE — 36415 COLL VENOUS BLD VENIPUNCTURE: CPT | Performed by: INTERNAL MEDICINE

## 2022-06-30 RX ORDER — POTASSIUM CHLORIDE 1.5 G/1.58G
40 POWDER, FOR SOLUTION ORAL ONCE
Status: DISCONTINUED | OUTPATIENT
Start: 2022-06-30 | End: 2022-07-01

## 2022-06-30 RX ORDER — SODIUM CHLORIDE AND POTASSIUM CHLORIDE 150; 900 MG/100ML; MG/100ML
INJECTION, SOLUTION INTRAVENOUS CONTINUOUS
Status: DISCONTINUED | OUTPATIENT
Start: 2022-06-30 | End: 2022-06-30

## 2022-06-30 RX ORDER — IBUPROFEN 400 MG/1
400 TABLET, FILM COATED ORAL EVERY 6 HOURS PRN
Status: DISCONTINUED | OUTPATIENT
Start: 2022-06-30 | End: 2022-07-06 | Stop reason: HOSPADM

## 2022-06-30 RX ORDER — BUPROPION HYDROCHLORIDE 300 MG/1
300 TABLET ORAL EVERY MORNING
Status: DISCONTINUED | OUTPATIENT
Start: 2022-07-01 | End: 2022-07-03

## 2022-06-30 RX ORDER — FLUTICASONE PROPIONATE 50 MCG
2 SPRAY, SUSPENSION (ML) NASAL DAILY PRN
Status: DISCONTINUED | OUTPATIENT
Start: 2022-06-30 | End: 2022-07-06 | Stop reason: HOSPADM

## 2022-06-30 RX ORDER — LORAZEPAM 2 MG/ML
1 INJECTION INTRAMUSCULAR EVERY 4 HOURS PRN
Status: DISCONTINUED | OUTPATIENT
Start: 2022-06-30 | End: 2022-07-04

## 2022-06-30 RX ORDER — POTASSIUM CHLORIDE 1500 MG/1
40 TABLET, EXTENDED RELEASE ORAL ONCE
Status: COMPLETED | OUTPATIENT
Start: 2022-06-30 | End: 2022-06-30

## 2022-06-30 RX ORDER — CETIRIZINE HYDROCHLORIDE 10 MG/1
10 TABLET ORAL DAILY PRN
Status: DISCONTINUED | OUTPATIENT
Start: 2022-07-01 | End: 2022-07-06 | Stop reason: HOSPADM

## 2022-06-30 RX ORDER — CEFAZOLIN SODIUM 1 G/50ML
1250 SOLUTION INTRAVENOUS EVERY 12 HOURS
Status: DISCONTINUED | OUTPATIENT
Start: 2022-06-30 | End: 2022-06-30

## 2022-06-30 RX ORDER — LAMOTRIGINE 25 MG/1
50 TABLET ORAL DAILY
Status: DISCONTINUED | OUTPATIENT
Start: 2022-06-30 | End: 2022-07-03

## 2022-06-30 RX ORDER — PROPOFOL 10 MG/ML
5-75 INJECTION, EMULSION INTRAVENOUS CONTINUOUS
Status: DISCONTINUED | OUTPATIENT
Start: 2022-06-30 | End: 2022-06-30

## 2022-06-30 RX ADMIN — PROPOFOL 5 MCG/KG/MIN: 10 INJECTION, EMULSION INTRAVENOUS at 01:47

## 2022-06-30 RX ADMIN — POTASSIUM CHLORIDE AND SODIUM CHLORIDE: 900; 150 INJECTION, SOLUTION INTRAVENOUS at 01:49

## 2022-06-30 RX ADMIN — ENOXAPARIN SODIUM 40 MG: 100 INJECTION SUBCUTANEOUS at 00:45

## 2022-06-30 RX ADMIN — POTASSIUM CHLORIDE 40 MEQ: 20 TABLET, EXTENDED RELEASE ORAL at 08:37

## 2022-06-30 RX ADMIN — PROPOFOL 45 MCG/KG/MIN: 10 INJECTION, EMULSION INTRAVENOUS at 04:40

## 2022-06-30 RX ADMIN — TAZOBACTAM SODIUM AND PIPERACILLIN SODIUM 4.5 G: 500; 4 INJECTION, SOLUTION INTRAVENOUS at 00:44

## 2022-06-30 RX ADMIN — SODIUM CHLORIDE: 9 INJECTION, SOLUTION INTRAVENOUS at 00:54

## 2022-06-30 RX ADMIN — PANTOPRAZOLE SODIUM 40 MG: 40 INJECTION, POWDER, FOR SOLUTION INTRAVENOUS at 08:22

## 2022-06-30 ASSESSMENT — ACTIVITIES OF DAILY LIVING (ADL)
ADLS_ACUITY_SCORE: 24
WEAR_GLASSES_OR_BLIND: NO
ADLS_ACUITY_SCORE: 24
ADLS_ACUITY_SCORE: 24
CHANGE_IN_FUNCTIONAL_STATUS_SINCE_ONSET_OF_CURRENT_ILLNESS/INJURY: NO
DRESSING/BATHING_DIFFICULTY: NO
ADLS_ACUITY_SCORE: 24
ADLS_ACUITY_SCORE: 21
WALKING_OR_CLIMBING_STAIRS_DIFFICULTY: NO
ADLS_ACUITY_SCORE: 24
ADLS_ACUITY_SCORE: 24
TOILETING_ISSUES: NO
ADLS_ACUITY_SCORE: 21
FALL_HISTORY_WITHIN_LAST_SIX_MONTHS: NO
CONCENTRATING,_REMEMBERING_OR_MAKING_DECISIONS_DIFFICULTY: OTHER (SEE COMMENTS)
ADLS_ACUITY_SCORE: 21
DIFFICULTY_EATING/SWALLOWING: OTHER (SEE COMMENTS)
ADLS_ACUITY_SCORE: 24
ADLS_ACUITY_SCORE: 21
DOING_ERRANDS_INDEPENDENTLY_DIFFICULTY: NO
ADLS_ACUITY_SCORE: 35

## 2022-06-30 NOTE — PROGRESS NOTES
Care Management Note:    72 HOUR HOLD START 6/30/22 at 8:25 AM  72 HOUR HOLD END 7/6/22 at 8:25 AM  72 hours hold do not include weekends or holidays    SW received referral due to suicide attempt.  Per EMR review, pt confirmed with MD, RN and DEC  that overdose was a suicide attempt.  Pt with history of suicide ideation in the the past and has history of mental health diagnosis.  Per EMR, previous stay with [facility] treatment program in 2010.        As pt is on a 72 hour hold, MD will need to determine if pt will require civil commitment process based on conversation with pt and DEC .    Once DEC  has completed assessment (possibly tomorrow due to pt's lethargy) CM staff will assist as needed with discharge planning.    DOMINIC Staples  Care Transitions   Tele: [phone]

## 2022-06-30 NOTE — ANESTHESIA CARE TRANSFER NOTE
Patient: Monica Morales    Procedure: * No procedures listed *       Diagnosis: * No pre-op diagnosis entered *  Diagnosis Additional Information: No value filed.    Anesthesia Type:   No value filed.     Note:    Oropharynx: endotracheal tube in place and ventilatory support  Level of Consciousness: unresponsive      Independent Airway: airway patency not satisfactory and stable  Dentition: dentition unchanged  Vital Signs Stable: post-procedure vital signs reviewed and stable  Report to RN Given: handoff report given  Patient transferred to: Emergency Department    Handoff Report: Identifed the Patient, Identified the Reponsible Provider, Reviewed the pertinent medical history, Discussed the surgical course, Reviewed Intra-OP anesthesia mangement and issues during anesthesia, Set expectations for post-procedure period and Allowed opportunity for questions and acknowledgement of understanding      Vitals:  Vitals Value Taken Time   BP 81/52 06/29/22 2237   Temp     Pulse 118 06/29/22 2237   Resp 14 06/29/22 2237   SpO2 97 % 06/29/22 2237       Electronically Signed By: RODNEY Dee CRNA  June 29, 2022  10:51 PM

## 2022-06-30 NOTE — PHARMACY-VANCOMYCIN DOSING SERVICE
"Pharmacy Vancomycin Initial Note  Date of Service 2022  Patient's  1995  27 year old, female    Indication: Sepsis    Current estimated CrCl = Estimated Creatinine Clearance: 124.7 mL/min (based on SCr of 0.99 mg/dL).    Creatinine for last 3 days  2022:  9:35 PM Creatinine 0.99 mg/dL;  9:35 PM Creatinine 0.99 mg/dL    Recent Vancomycin Level(s) for last 3 days  No results found for requested labs within last 72 hours.      Vancomycin IV Administrations (past 72 hours)      No vancomycin orders with administrations in past 72 hours.                Nephrotoxins and other renal medications (From now, onward)    Start     Dose/Rate Route Frequency Ordered Stop    22 2300  vancomycin (VANCOCIN) 1,250 mg in sodium chloride 0.9 % 250 mL intermittent infusion         1,250 mg  over 90 Minutes Intravenous EVERY 12 HOURS 22 2258      22 2255  piperacillin-tazobactam (ZOSYN) intermittent infusion 4.5 g        Note to Pharmacy: For [facility], [facility] and [facility]: For Zosyn-naive patients, use the \"Zosyn initial dose + extended infusion\" order panel.    4.5 g  200 mL/hr over 30 Minutes Intravenous ONCE 22            Contrast Orders - past 72 hours (72h ago, onward)    None          [employer] Prediction of Planned Initial Vancomycin Regimen  Regimen: 1250 mg IV every 12 hours.  Start time: 22:59 on 2022  Exposure target: AUC24 (range)400-600 mg/L.hr   AUC24,ss: 535 mg/L.hr  Probability of AUC24 > 400: 71 %  Ctrough,ss: 14.7 mg/L  Probability of Ctrough,ss > 20: 35 %  Probability of nephrotoxicity (Lodise SASHA ): 10 %        Plan:  1. Start vancomycin  1250 mg IV q14h.   2. Vancomycin monitoring method: AUC  3. Vancomycin therapeutic monitoring goal: 400-600 mg*h/L  4. Pharmacy will check vancomycin levels as appropriate in 1-3 Days.    5. Serum creatinine levels will be ordered daily for the first week of therapy and at least twice weekly for subsequent weeks.      Haseeb Barnhart, " RPH

## 2022-06-30 NOTE — PLAN OF CARE
Patient began to wake up after arriving to floor. Propofol started per MD order and patient now resting comfortably, not fighting vent. Patient woke to staff checking blood glucose and nodded approval when staff explained where she was and what they were doing. After propofol titration patient not twitching extremities nor opening eyes spontaneously. Appears to be resting comfortably. Mckeon placed and patent. Thick secretions requiring frequent suctioning. NG output minimal.

## 2022-06-30 NOTE — ED NOTES
Pt was noted to have seizure-like activity at 2150. Lasted less than a minute. Pt was noted to become decorticate posturing with hands and full body shaking. Eyelids blinking open and shut. Dr Gray notified of seizure activity.     Ativan ordered and given
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
## 2022-06-30 NOTE — ANESTHESIA PREPROCEDURE EVALUATION
Anesthesia Pre-Procedure Evaluation    Patient: Monica Morales   MRN: [id number] : 1995        Procedure : * No procedures listed *          Past Medical History:   Diagnosis Date     Abnormal Pap smear of cervix 2021    03/3/21     Anxiety      Depressive disorder       No past surgical history on file.   Allergies   Allergen Reactions     Latex      Seasonal Allergies Itching      Social History     Tobacco Use     Smoking status: Former Smoker     Smokeless tobacco: Never Used   Substance Use Topics     Alcohol use: Not Currently      Wt Readings from Last 1 Encounters:   22 131.5 kg (290 lb)        Anesthesia Evaluation            ROS/MED HX  ENT/Pulmonary:       Neurologic:       Cardiovascular:       METS/Exercise Tolerance:     Hematologic:       Musculoskeletal:       GI/Hepatic:       Renal/Genitourinary:       Endo:     (+) Obesity,     Psychiatric/Substance Use:       Infectious Disease:       Malignancy:       Other:            Physical Exam    Airway   unable to assess          Respiratory Devices and Support         Dental    unable to assess        Cardiovascular             Pulmonary                   OUTSIDE LABS:  CBC:   Lab Results   Component Value Date    WBC 20.7 (H) 2022    WBC 7.6 2019    HGB 15.2 2022    HGB 13.3 2019    HCT 46.6 2022    HCT 40.1 2019     2022     2019     BMP:   Lab Results   Component Value Date     2022     2022    POTASSIUM 3.1 (L) 2022    POTASSIUM 3.1 (L) 2022    CHLORIDE 109 2022    CHLORIDE 109 2022    CO2 16 (L) 2022    CO2 16 (L) 2022    BUN 7 2022    BUN 7 2022    CR 0.99 2022    CR 0.99 2022     (H) 2022     (H) 2022     (H) 2022     COAGS: No results found for: PTT, INR, FIBR  POC:   Lab Results   Component Value Date    HCG NEGATIVE 2021    HCGS  Negative 04/16/2019     HEPATIC:   Lab Results   Component Value Date    ALBUMIN 3.5 06/29/2022    PROTTOTAL 6.6 (L) 06/29/2022    ALT 58 (H) 06/29/2022    AST 42 06/29/2022    ALKPHOS 93 06/29/2022    BILITOTAL 0.3 06/29/2022     OTHER:   Lab Results   Component Value Date    A1C 5.4 08/11/2020    SOO 8.2 (L) 06/29/2022    SOO 8.2 (L) 06/29/2022    TSH 1.18 06/07/2019       Anesthesia Plan    ASA Status:  3   NPO Status:  ELEVATED Aspiration Risk/Unknown       Induction: Propofol.           Consents    Anesthesia Plan(s) and associated risks, benefits, and realistic alternatives discussed. Questions answered and patient/representative(s) expressed understanding.    - Discussed:     - Discussed with:  Implied consent/emergency         Postoperative Care            Comments:                RODNEY Dee CRNA
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
## 2022-06-30 NOTE — PROGRESS NOTES
Patient sitting up in chair , attempted to eat lunch, not so coordinated , spilling  food.is cooperative,sleeps in and out. Will wait to call for DEC assessment till fully awake.

## 2022-06-30 NOTE — PROGRESS NOTES
"Pt extubated and NG tube removed at 0737, pt tolerated this well. She is on RA and lung sounds are clear to all fields.    Around 0815 pt started cry and stating that her mother doesn't love her. She stated, \"Why am I here, I am not supposed to be alive.\" She continued to state that she purposely took drugs to kill herself, she explained that her mother used her as her emotional support dog when she was a child. Dr. Jason came in to see the pt during this conversation.    Stacey Villalobos from DEC was unable to complete a DEC assessment now d/t pt in and out of drowsiness, requested to get an order for a new DEC assessment in the after noon. Writer paged Dr. Jason about this.  "

## 2022-06-30 NOTE — ANESTHESIA POSTPROCEDURE EVALUATION
Patient: Monica Morales    Procedure: * No procedures listed *       Anesthesia Type:  No value filed.    Note:  Disposition: Outpatient   Postop Pain Control: Uneventful            Sign Out: Well controlled pain   PONV: No   Neuro/Psych: Uneventful            Sign Out: Acceptable/Baseline neuro status   Airway/Respiratory: Uneventful            Sign Out: AIRWAY IN SITU/Resp. Support               Airway in situ/Resp. Support: ETT   CV/Hemodynamics: Uneventful            Sign Out: Acceptable CV status; No obvious hypovolemia; No obvious fluid overload   Other NRE: NONE   DID A NON-ROUTINE EVENT OCCUR? No           Last vitals:  Vitals:    06/29/22 2137 06/29/22 2231 06/29/22 2237   BP: 124/61 127/73 (!) 81/52   Pulse:  (!) 126 118   Resp:  16 14   Temp:  (!) 38.2  C (100.7  F)    SpO2:  100% 97%       Electronically Signed By: RODNEY Dee CRNA  June 29, 2022  10:52 PM

## 2022-06-30 NOTE — PROGRESS NOTES
Right wrist and Left wrist restraints initiated on patient on 6/30/2022 at 1:14 AM    Clinical Justification: Pulling lines, pulling tubes, and pulling equipment  Less Restrictive Alternative: 1:1 patient care, Repositioning, Pain management  Attending Physician Notified: MD ordered restraint,     Order received: Yes     Family Notification: Other   Criteria explained to Patient  Patient's Response: No evidence of learning  Restraint care Plan initiated: Yes    Jaden Betts RN

## 2022-06-30 NOTE — PROGRESS NOTES
"SPIRITUAL HEALTH SERVICES  [facility] - ICU    Referral Source: Patient request when speaking with her MD    - Monica was intermittently resting and looking around the room.  It was difficult for her to focus.  She asked what day it was.  She knew where she was.  She was unsure about what was going on at times and other times would clear.    - She mentioned that her mother lived in [place].  When I mentioned that she had talked with her MD about \"not getting along with her mother\" Monica spoke of something related to her birthday, it was difficult to understand, and all her family being there but her mother left and she didn't know why.     - Monica said that she had a Christianity in [place] [place] [facility]"" near where she lived.  She acknowledged that God was a part of her life and would come to tears just for a moment.  Her conversation was very fragmented and difficult to follow.    - I offered to let her rest today and return tomorrow to see how she was doing.  That was OK with her.    Plan:  will remain available for any ongoing support needs during LOS.    Basil Kapadia M.A., [facility]  Lead Chaplain [facility]  Office: [phone]    "

## 2022-06-30 NOTE — CONSULTS
"6/29/2022  [facility] 1995     Writer consulted with ICU nurse Lynn,  on this date at 9:25 AM. It was determined that pt would not benefit from assessment at this time due to Pt unable able to participate in assessment. Pt was unable to keep her eyes open and was unable to answer any questions. Pt moved her head up and down when asked if she attempted to complete suicide. Pt is unable to discuss anything further.    ICU will resubmit for a DEC assessment after pt has been allowed time to have medications further metabolize, is able to eat and wake to a point where she can actively participate in the assessment. It was discussed that it might be beneficial to consider assessing pt after 1pm today.     Additional note provided in pt's chart from hospital : \"Once DEC  has completed assessment (possibly tomorrow) due to pt's lethargy)  staff will assist as needed with discharge planning.  [facility]  Care Transitions   Tele: [phone]\"    Stacey Veliz, [facility], [facility]    "

## 2022-06-30 NOTE — PROGRESS NOTES
Patient alert,now eating lunch,cooperative,more awake than this morning.Plan to have DEC assessment at 1:30pm today.

## 2022-06-30 NOTE — PROGRESS NOTES
[facility] Medicine Progress Note  Date of Service: 06/30/2022    Assessment & Plan        Ms. Morales is a 27 year old  female with a history of depression, anxiety, and polysubstance abuse who was transported to the ER via ambulance following a drug overdose.     Assessment/Plan     Polysubstance overdose  The patient has not been able to provide a verbal history of exact substances and amounts that she took, though drug screen is positive for PCP and opiates.  EtOH was negative.  This morning, with propofol stopped, the patient awakens to voice.  At this point, we can extubate her and obtain additional history, particularly whether she intended self-harm with last night's OD.  -Will obtain history regarding chronicity and frequency of drug use, to determine whether a withdrawal syndrome is likely.  -Will question regarding possible intent for self-harm.  If self-harm was attempted, we will need to put a hold on her and obtain a DEC assessment.     Acute ventilatory failure  Intubated electively in ED.  She has been stable on mechanical ventilation.  Current minute volume on , rate 14, FiO2 0.40, and PEEP 5 cm is 5.6 L..  She did well on a PSV wean trial earlier this morning.  With propofol off, she is easily arousable to voice.  There is a strong cough reflex with suctioning.  -We will extubate this morning.     New onset seizure probably due to phencyclidine intoxication.  Seizure was witnessed in the EMS rig en route to ED.  ED notes do not describe whether medication was necessary to terminate seizure.  We have not seen seizure since ICU arrival, though she has been on a propofol infusion.    -Monitor for additional seizure.  -Lorazepam is available as needed.     Lactic acidosis  Hypotension  Initial lactate was 4.1.  The patient had mild hypotension on ED arrival that resolved with modest fluid administration.  Lactate normalized after IVF.  I  suspect that these findings were due to opioid intoxication.  There is no evidence of underlying infection by history or exam.  Chest x-ray is clear.  UA is negative.  The patient was started on empiric vancomycin and piperacillin/tazobactam in ED.  -We will discontinue empiric antibiotics and observe.    Leukocytosis  WBC on ED arrival was 20.7 --> 17.6 this morning.  As described above, there is no evidence of underlying infection.  This may be a reactive leukocytosis to OD and ventilatory failure.  -Empiric antibiotics have been stopped.  -Monitor WBC.     Hypokalemia  Potassium was 3.1 on ED arrival.  She has been started on IV fluids containing potassium, though potassium this morning remains low at 3.3.  -I ordered potassium chloride 40 mEq equivalent once enterally this morning.  Now that she is extubated, we will give this orally once she passes a dysphagia screen.    Hypernatremia  Hyperchloremia  Both were normal on admission, though this morning sodium is 146 and chloride 111.  Abnormalities are probably due to NaCl containing IV fluids given since ED arrival.  -IV fluids will be stopped after extubation.  -Encourage oral free water.  -Repeat labs in the morning.    Diet: NPO for Medical/Clinical Reasons Except for: No Exceptions.  We will perform a dysphagia screen at bedside after extubation, and begin regular diet if no dysphagia seen.  DVT Prophylaxis: Enoxaparin (Lovenox) SQ  Mckeon Catheter: PRESENT, indication: Deep Sedation/Paralysis.  Will remove after extubation.  Central Lines: None  Code Status: Full Code           Discussion: We are going to extubate her this morning.  After extubation, we will need some information as to the intent of her OD, specifically whether she intended self-harm.  If self-harm was intended, we will need to put a 72-hour hold on her and obtain a DEC assessment.  We also need information as to the chronicity of her polysubstance use, to gauge whether she is at risk for a  withdrawal syndrome.    Disposition: Anticipate discharge in 1 to 2 days unless she requires a hold.    Attestation:  I have reviewed today's vital signs, notes, medications, labs and imaging.  Amount of time spent providing critical care service today: 45 minutes.    Eliceo Jason MD   [facility] Medicine      Interval History   No verbal history is obtainable from this intubated patient.    Physical Exam   Temp:  [98.5  F (36.9  C)-100.7  F (38.2  C)] 98.5  F (36.9  C)  Pulse:  [] 96  Resp:  [14-22] 14  BP: ()/() 125/76  FiO2 (%):  [30 %] 30 %  SpO2:  [94 %-100 %] 100 %    Weights:   Vitals:    06/29/22 2129 06/30/22 0040   Weight: 131.5 kg (290 lb) 128.7 kg (283 lb 11.7 oz)    Body mass index is 43.14 kg/m .    GENERAL: Young appearing female, who appears meticulously groomed in terms of hair, make-up, and nails.  She is intubated, and just regaining alertness after propofol discontinuation.  EYES: Eyes grossly normal to inspection, extraocular movements intact  HENT: NG tube is in place.  No nasal drainage is seen.  NECK: Trachea midline.   RESP: Intubated orally with 7.5 ETT.  With decrease in ventilator rate to 8/min, the patient initiated spontaneous breathing.  Lungs clear throughout on inspiration and expiration.  Expiration not prolonged, no wheeze.  Scant, clear secretions are being suctioned.  CV: Regular rate and rhythm, non-tachycardic.  Normal S1 S2, no murmur or extra sound.  No lower extremity edema.  ABDOMEN: Soft, no guarding.  Liver and spleen not enlarged, no masses palpable.  Bowel sounds positive.  MS: No bony deformities noted.  No red or inflamed joints.  SKIN: Warm and dry, no rashes.  NEURO: Somnolent shortly after propofol discontinuation, though she does open her eyes promptly to voice.  Cranial nerves III - XII grossly intact.  No gross motor or sensory deficits.    : Mckeon catheter is noted.  PSYCH: Deferred in this intubation, nonconversant patient.        Data    Recent Labs   Lab 06/30/22  0424 06/30/22  0157 06/29/22 2141 06/29/22 2135   WBC 17.6*  --   --  20.7*   HGB 13.2  --   --  15.2   MCV 88  --   --  92     --   --  330   *  --   --  142  142   POTASSIUM 3.3*  --   --  3.1*  3.1*   CHLORIDE 111*  --   --  109  109   CO2 25  --   --  16*  16*   BUN 6*  --   --  7  7   CR 1.01  --   --  0.99  0.99   ANIONGAP 10  --   --  17*  17*   SOO 7.5*  --   --  8.2*  8.2*   GLC 97 149* 148* 150*  150*   ALBUMIN 3.0*  --   --  3.5   PROTTOTAL 5.4*  --   --  6.6*   BILITOTAL 0.5  --   --  0.3   ALKPHOS 70  --   --  93   ALT 47  --   --  58*   AST 34  --   --  42       Recent Labs   Lab 06/30/22 0424 06/30/22  0157 06/29/22 2141 06/29/22 2135   GLC 97 149* 148* 150*  150*        Unresulted Labs Ordered in the Past 30 Days of this Admission     Date and Time Order Name Status Description    6/29/2022 11:13 PM Urine Culture In process     6/29/2022 10:53 PM Blood Culture Arm, Left In process     6/29/2022 10:53 PM Blood Culture Peripheral Blood In process            Imaging  Recent Results (from the past 24 hour(s))   CT Head w/o Contrast    Narrative    EXAM: CT HEAD W/O CONTRAST, CT CERVICAL SPINE W/O CONTRAST  LOCATION: [facility]  DATE/TIME: 6/29/2022 10:20 PM    INDICATION: seizure  COMPARISON: None.  TECHNIQUE:   1) Routine CT Head without IV contrast. Multiplanar reformats. Dose reduction techniques were used.  2) Routine CT Cervical Spine without IV contrast. Multiplanar reformats. Dose reduction techniques were used.    FINDINGS:   HEAD CT:   INTRACRANIAL CONTENTS: No intracranial hemorrhage, extraaxial collection, or mass effect.  No CT evidence of acute infarct. Normal parenchymal attenuation. Normal ventricles and sulci.     VISUALIZED ORBITS/SINUSES/MASTOIDS: No intraorbital abnormality. Opacification of left frontal sinus. No middle ear or mastoid effusion.    BONES/SOFT TISSUES: No acute abnormality.    CERVICAL  SPINE CT:   VERTEBRA: Normal vertebral body heights and alignment. No fracture or posttraumatic subluxation.     CANAL/FORAMINA: No canal or neural foraminal stenosis.    PARASPINAL: No extraspinal abnormality. Visualized lung fields are clear.      Impression    IMPRESSION:  HEAD CT:  1.  Normal head CT.    CERVICAL SPINE CT:  1.  No CT evidence for acute fracture or post traumatic subluxation.   CT Cervical Spine w/o Contrast    Narrative    EXAM: CT HEAD W/O CONTRAST, CT CERVICAL SPINE W/O CONTRAST  LOCATION: [facility]  DATE/TIME: 6/29/2022 10:20 PM    INDICATION: seizure  COMPARISON: None.  TECHNIQUE:   1) Routine CT Head without IV contrast. Multiplanar reformats. Dose reduction techniques were used.  2) Routine CT Cervical Spine without IV contrast. Multiplanar reformats. Dose reduction techniques were used.    FINDINGS:   HEAD CT:   INTRACRANIAL CONTENTS: No intracranial hemorrhage, extraaxial collection, or mass effect.  No CT evidence of acute infarct. Normal parenchymal attenuation. Normal ventricles and sulci.     VISUALIZED ORBITS/SINUSES/MASTOIDS: No intraorbital abnormality. Opacification of left frontal sinus. No middle ear or mastoid effusion.    BONES/SOFT TISSUES: No acute abnormality.    CERVICAL SPINE CT:   VERTEBRA: Normal vertebral body heights and alignment. No fracture or posttraumatic subluxation.     CANAL/FORAMINA: No canal or neural foraminal stenosis.    PARASPINAL: No extraspinal abnormality. Visualized lung fields are clear.      Impression    IMPRESSION:  HEAD CT:  1.  Normal head CT.    CERVICAL SPINE CT:  1.  No CT evidence for acute fracture or post traumatic subluxation.   XR Chest Port 1 View    Narrative    EXAM: XR CHEST PORT 1 VIEW  LOCATION: [facility]  DATE/TIME: 6/29/2022 10:59 PM    INDICATION: intubation and ng verification  COMPARISON: None.      Impression    IMPRESSION: ET tube in good position with tip  projecting at level of mid clavicular heads and 2.6 cm above chandana. NG tube into the stomach.    Mildly low lung volumes but lungs appear grossly clear. Heart size normal.        I reviewed all new labs and imaging results over the last 24 hours. I personally reviewed the chest x-ray image(s) showing An ETT and NG to be in good position.  Lung fields are clear.    Medications     0.9% sodium chloride + KCl 20 mEq/L 100 mL/hr at 06/30/22 0149       enoxaparin ANTICOAGULANT  40 mg Subcutaneous Q24H     pantoprazole  40 mg Per Feeding Tube QAM AC    Or     pantoprazole (PROTONIX) IV  40 mg Intravenous QAM AC     potassium chloride  40 mEq Per NG tube Once       Eliceo Jason MD   [facility]

## 2022-06-30 NOTE — ED NOTES
Narcan found in pt's car was not used prior to EMS.  reported seals on narcan intake when looking in the car.

## 2022-06-30 NOTE — ANESTHESIA PROCEDURE NOTES
Airway         Procedure Start/Stop Times: 6/29/2022 10:31 PM  Staff -        CRNA: Leonardo Cabrales APRN CRNA       Performed By: CRNA  Consent for Airway        Urgency: emergent       Consent: The procedure was performed in an emergent situation.  Indications and Patient Condition       Indications for airway management: altered level of consciousness and airway protection       Induction type:RSI       Mask difficulty assessment: 0 - not attempted    Final Airway Details       Final airway type: endotracheal airway       Successful airway: ETT - single  Endotracheal Airway Details        ETT size (mm): 7.5       Cuffed: yes       Cuff volume (mL): 7       Successful intubation technique: video laryngoscopy       VL Blade Size: Amador 3       Grade View of Cords: 1       Adjucts: stylet       Position: Center       Measured from: lips       Secured at (cm): 22       Bite block used: None    Post intubation assessment        Placement verified by: capnometry, equal breath sounds and chest rise        Number of attempts at approach: 1       Number of other approaches attempted: 0       Secured with: commercial tube marx       Ease of procedure: easy       Dentition: Intact and Unchanged    Medication(s) Administered   Medication Administration Time: 6/29/2022 10:31 PM

## 2022-06-30 NOTE — PROGRESS NOTES
"UPDATE    Although she seems to be under the effects of some residual sedation, she is able to have a conversation about her intentions last night.  She does indeed tell us that she intended to self-harm with her overdose, and says that she \"does not want to be alive\".  Although she keeps dozing off in the middle of her story, it sounds as though she has a difficult relationship with her mother and perhaps other family members.  It will be easier to get a history when she is less likely to doze off.    -Patient identifies herself as Mosque, and would like to speak to a .  -I will place a 72-hour hold on her based on the information above.  -We will request a DEC assessment.  This may need to wait until later in the day, as she is still just a bit somnolent as above.    Eliceo Jason MD  [facility]  "
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
## 2022-07-01 LAB
ANION GAP SERPL CALCULATED.3IONS-SCNC: 4 MMOL/L (ref 3–14)
BACTERIA UR CULT: NO GROWTH
BUN SERPL-MCNC: 4 MG/DL (ref 7–30)
CALCIUM SERPL-MCNC: 8.1 MG/DL (ref 8.5–10.1)
CHLORIDE BLD-SCNC: 113 MMOL/L (ref 94–109)
CO2 SERPL-SCNC: 27 MMOL/L (ref 20–32)
CREAT SERPL-MCNC: 0.89 MG/DL (ref 0.52–1.04)
ERYTHROCYTE [DISTWIDTH] IN BLOOD BY AUTOMATED COUNT: 12.9 % (ref 10–15)
GFR SERPL CREATININE-BSD FRML MDRD: >90 ML/MIN/1.73M2
GLUCOSE BLD-MCNC: 117 MG/DL (ref 70–99)
GLUCOSE BLDC GLUCOMTR-MCNC: 104 MG/DL (ref 70–99)
GLUCOSE BLDC GLUCOMTR-MCNC: 117 MG/DL (ref 70–99)
GLUCOSE BLDC GLUCOMTR-MCNC: 119 MG/DL (ref 70–99)
HCT VFR BLD AUTO: 39.5 % (ref 35–47)
HGB BLD-MCNC: 13 G/DL (ref 11.7–15.7)
LACTATE SERPL-SCNC: 1.3 MMOL/L (ref 0.7–2)
MCH RBC QN AUTO: 29.3 PG (ref 26.5–33)
MCHC RBC AUTO-ENTMCNC: 32.9 G/DL (ref 31.5–36.5)
MCV RBC AUTO: 89 FL (ref 78–100)
PLATELET # BLD AUTO: 215 10E3/UL (ref 150–450)
POTASSIUM BLD-SCNC: 3.2 MMOL/L (ref 3.4–5.3)
RBC # BLD AUTO: 4.43 10E6/UL (ref 3.8–5.2)
SODIUM SERPL-SCNC: 144 MMOL/L (ref 133–144)
WBC # BLD AUTO: 13.1 10E3/UL (ref 4–11)

## 2022-07-01 PROCEDURE — 250N000011 HC RX IP 250 OP 636

## 2022-07-01 PROCEDURE — 83605 ASSAY OF LACTIC ACID: CPT

## 2022-07-01 PROCEDURE — 250N000013 HC RX MED GY IP 250 OP 250 PS 637

## 2022-07-01 PROCEDURE — 120N000004 HC R&B MS OVERFLOW

## 2022-07-01 PROCEDURE — 90791 PSYCH DIAGNOSTIC EVALUATION: CPT

## 2022-07-01 PROCEDURE — 80048 BASIC METABOLIC PNL TOTAL CA: CPT

## 2022-07-01 PROCEDURE — 250N000011 HC RX IP 250 OP 636: Performed by: INTERNAL MEDICINE

## 2022-07-01 PROCEDURE — 36415 COLL VENOUS BLD VENIPUNCTURE: CPT

## 2022-07-01 PROCEDURE — 85014 HEMATOCRIT: CPT

## 2022-07-01 PROCEDURE — 99233 SBSQ HOSP IP/OBS HIGH 50: CPT

## 2022-07-01 RX ORDER — OLANZAPINE 5 MG/1
5 TABLET, ORALLY DISINTEGRATING ORAL 3 TIMES DAILY PRN
Status: DISCONTINUED | OUTPATIENT
Start: 2022-07-01 | End: 2022-07-06 | Stop reason: HOSPADM

## 2022-07-01 RX ORDER — POTASSIUM CHLORIDE 1500 MG/1
40 TABLET, EXTENDED RELEASE ORAL ONCE
Status: COMPLETED | OUTPATIENT
Start: 2022-07-01 | End: 2022-07-01

## 2022-07-01 RX ORDER — OLANZAPINE 10 MG/2ML
5 INJECTION, POWDER, FOR SOLUTION INTRAMUSCULAR ONCE
Status: COMPLETED | OUTPATIENT
Start: 2022-07-01 | End: 2022-07-01

## 2022-07-01 RX ADMIN — POTASSIUM CHLORIDE 40 MEQ: 20 TABLET, EXTENDED RELEASE ORAL at 10:19

## 2022-07-01 RX ADMIN — ENOXAPARIN SODIUM 40 MG: 100 INJECTION SUBCUTANEOUS at 00:54

## 2022-07-01 RX ADMIN — BUPROPION HYDROCHLORIDE 300 MG: 300 TABLET, FILM COATED, EXTENDED RELEASE ORAL at 09:12

## 2022-07-01 RX ADMIN — LAMOTRIGINE 50 MG: 25 TABLET ORAL at 09:12

## 2022-07-01 RX ADMIN — OLANZAPINE 5 MG: 10 INJECTION, POWDER, FOR SOLUTION INTRAMUSCULAR at 02:49

## 2022-07-01 RX ADMIN — OLANZAPINE 5 MG: 5 TABLET, ORALLY DISINTEGRATING ORAL at 10:55

## 2022-07-01 ASSESSMENT — ACTIVITIES OF DAILY LIVING (ADL)
ADLS_ACUITY_SCORE: 23
ADLS_ACUITY_SCORE: 26
ADLS_ACUITY_SCORE: 23
ADLS_ACUITY_SCORE: 23
ADLS_ACUITY_SCORE: 26
ADLS_ACUITY_SCORE: 26
ADLS_ACUITY_SCORE: 23
ADLS_ACUITY_SCORE: 26

## 2022-07-01 NOTE — PLAN OF CARE
"Writer assumed cares [id number].     Patient has stated a few words throughout the night and has answered questions by shaking her head yes and no. She has been laying in bed overnight. She does reposition and weight shift. Patient does not appear to be in pain but patient does not answer. Patient has had some intermittent agitation/restlessness but has overall been calm and sleeping. Zyprexa 5mg IM administered. Patient will intermittently waken and ask \"what's happening\" and flail her arms in the air.      After Zyprexa was administered, patient has remained calm and sleeping comfortably.     This morning patient became more alert. She does not remember what happened, when oriented to situation patient does state that she tried to harm herself. She was tearful and stated, \"I'm alive?\" Writer asked her how she feels about it and she smiled and stated, \"I'm saturnino.\" Patient also stated that she \"feels like I'm in a dream.\"     Patient did tell writer her name and date of birth.     R&L PIV saline locked.  On room air.     Mckeon catheter removed. Patient ambulated to bathroom x1. She is steady, SBA with gaitbelt until she is more alert.     She drank about 4 cups of ice water. Took a couple of bites of a sandwich.     Patient remains on a 1:1.  "
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
## 2022-07-01 NOTE — PLAN OF CARE
Goal Outcome Evaluation:    Plan of Care Reviewed With: patient     Overall Patient Progress: no change    Outcome Evaluation: Alertness intermittently, but mostly confused and drowsy this shift. Continuing to provide care until full DEC assessment is able to be done.      Problem: Suicide Risk  Goal: Absence of Self-Harm  Outcome: Ongoing, Progressing       Problem: Neurologic Impairment (Overdose)  Goal: Optimal Neurologic Function  Outcome: Ongoing, Progressing

## 2022-07-01 NOTE — PROGRESS NOTES
[facility] Medicine Progress Note  Date of Service: 07/01/2022    Assessment & Plan        Ms. Morales is a 27 year old  female with a history of depression, anxiety, and polysubstance abuse who was transported to the ER via ambulance following a drug overdose.     Assessment/Plan     Polysubstance overdose  The patient has not been able to provide a verbal history of exact substances and amounts that she took, though drug screen was positive for PCP and opiates.  EtOH was negative.  I tried to question her this morning about any history of chronic drug use, though she is not able to answer these questions, see toxic encephalopathy below.  On questioning after extubation, the patient informed us that she intended self-harm when she took her overdose, discussed more below.     Acute ventilatory failure  Intubated electively in ED.  She has been stable on mechanical ventilation.  Current minute volume on , rate 14, FiO2 0.40, and PEEP 5 cm is 5.6 L..  She did well on a PSV wean trial earlier 6/30/2022 morning, and was extubated.  She has had no respiratory difficulties following extubation.  She has no oxygen needs.  -Resolved.     New onset seizure probably due to phencyclidine intoxication.  Seizure was witnessed in the EMS rig en route to ED.  ED notes do not describe whether medication was necessary to terminate seizure.  We have seen no seizure activity since admission.  -Monitor for additional seizure.  -Lorazepam is available as needed.    Toxic encephalopathy due to phencyclidine  Since extubation, the patient has remained confused.  Although she cannot state her name and her date of birth, she is periodically disoriented to her surroundings, and has seemingly no recollection of events leading up to her OD and this hospitalization.  She is intermittently agitated, with some behaviors, specifically flinging her arms.  She received olanzapine 5 mg  "IM overnight, which did seem to calm her.  This morning, she acknowledges being fearful, and explained that she does not know where she is.  I reviewed the circumstances that led up to this hospitalization, reviewed that she told us that she intended self-harm by her overdose, and explained that she needs to remain in the hospital for ongoing observation and care.  She responded \"thank you\", and laid back down.  -I think that this encephalopathy and delirium could last another 1 to 2 days as result of phencyclidine toxicity.  We will make olanzapine 0.5 mg p.o. 3 times daily as needed available.    Risk for self-harm  After extubation 6/30/2022, when she seems to be oriented and decisional, we asked if her overdose was an attempt at self-harm.  She acknowledged that it was without hesitation, and then told us that she \"did not want to be alive\".  On the morning of 6/30/2022, we put her under a 72-hour hold.  A DEC assessment is ordered, though the patient has not had the cognitive capacity to participate as of yet.  -Continue under a 72-hour hold.  If her mental status does not clear over the next 24 hours, we will need to pursue civil commitment.  -Continue one-to-one observation.  -DEC assessment when the patient can cognitively participate.     Lactic acidosis  Hypotension  SIRS from phencyclidine toxicity  Initial lactate was 4.1.  The patient had mild hypotension on ED arrival that resolved with modest fluid administration.  Lactate normalized after IVF.  I suspect that these findings were due to opioid intoxication.  There is no evidence of underlying infection by history or exam.  Chest x-ray was clear.  UA was negative.  The patient was started on empiric vancomycin and piperacillin/tazobactam in ED, which I stopped 6/30/2022 as there was no evidence of underlying infection.  She remains afebrile and hemodynamically stable off of antibiotics.  -Resolved.    Leukocytosis  WBC on ED arrival was 20.7 --> 17.6 " --> 13.1 this morning.  As described above, there is no evidence of underlying infection.  This may be a reactive leukocytosis to OD and ventilatory failure.  -Empiric antibiotics have been stopped.  -Monitor WBC.     Hypokalemia  Potassium was 3.1 on ED arrival.  She has been started on IV fluids containing potassium, though potassium this morning remains low at 3.3.  The potassium chloride 40 mEq p.o. dose I ordered on 6/30/2022 was not given.  Potassium this morning remains low at 3.2.  -Potassium chloride 40 mEq equivalent once this morning, then recheck K in AM.    Hypernatremia  Hyperchloremia  Both were normal on admission, though on 6/30/2022 morning sodium was 146 and chloride 111.  Abnormalities are probably due to NaCl containing IV fluids she was given in ED and after admission.  IV fluids were stopped yesterday.  Sodium has normalized at 144.  Chloride remains mildly elevated at 113.  -No additional therapy or monitoring is required.  I anticipate the chloride will normalize with ongoing oral intake.    Diet: Regular Diet Adult.  We will perform a dysphagia screen at bedside after extubation, and begin regular diet if no dysphagia seen.  DVT Prophylaxis: Enoxaparin (Lovenox) subcutaneous until ambulatory.  Mckeon Catheter: Not present.    Central Lines: None  Code Status: Full Code           Discussion: Encephalopathic likely due to phencyclidine toxicity.  We will make olanzapine as needed available.  This encephalopathy prevents her from participating in a DEC assessment.  She is now starting day #2 of a 72-hour hold.  If encephalopathy does not clear over the next 24 hours, we will need to pursue civil commitment.    Disposition: Anticipate discharge in 1 to 2 additional days once encephalopathy clears and if cleared for discharge by DEC.    Attestation:  I have reviewed today's vital signs, notes, medications, labs and imaging.  Amount of time spent providing critical care service today: 35  "minutes.    Eliceo Jason MD   [facility] Medicine      Interval History   She appeared to be sleeping when I entered her room.  When I awoke her, she sat up bolt upright, and began to look around her as if confused.  She did not answer my questions about symptoms, so I abandoned questioning her, and instead tried to reassure her as to her location and safety.    Physical Exam   Temp:  [98.6  F (37  C)-99  F (37.2  C)] 98.6  F (37  C)  Pulse:  [] 101  Resp:  [8-105] 20  BP: (145-172)/() 153/95  SpO2:  [96 %-100 %] 100 %    Weights:   Vitals:    06/29/22 2129 06/30/22 0040 07/01/22 0828   Weight: 131.5 kg (290 lb) 128.7 kg (283 lb 11.7 oz) 121.3 kg (267 lb 6.7 oz)    Body mass index is 40.66 kg/m .    GENERAL: Young appearing female, seated upright in bed, appears confused and fearful.  EYES: Eyes grossly normal to inspection, extraocular movements intact  HENT: Nares patent bilaterally, no discharge.  NECK: Trachea midline.   RESP: No accessory muscle use.  Lungs clear throughout on inspiration and expiration.  Expiration not prolonged, no wheeze.   CV: Regular rate and rhythm, mildly tachycardic.  Normal S1 S2, no murmur or extra sound.  No lower extremity edema.  ABDOMEN: Soft, no guarding.  Liver and spleen not enlarged, no masses palpable.  Bowel sounds positive.  MS: No bony deformities noted.  No red or inflamed joints.  SKIN: Warm and dry, no rashes.  NEURO: Awake, alert, and seemingly confused.  Cranial nerves III - XII grossly intact.  No gross motor or sensory deficits.  Tone is normal.  There are no abnormal movements.  PSYCH: Awake, but minimally conversant.  She was seated both upright in bed, looking around the room as if confused.  She was softly whispering \"what, what\".  She did maintain eye contact with me while I was speaking.  She appeared to be reassured by my review of the circumstances leading up to hospitalization, her current location, and are plan going forward.  She laid " back down and appeared calm when I left the room.        Data   Recent Labs   Lab 07/01/22  0753 07/01/22  0503 07/01/22  0010 06/30/22  0734 06/30/22 0424 06/29/22 2141 06/29/22  2135   WBC  --  13.1*  --   --  17.6*  --  20.7*   HGB  --  13.0  --   --  13.2  --  15.2   MCV  --  89  --   --  88  --  92   PLT  --  215  --   --  247  --  330   NA  --  144  --   --  146*  --  142  142   POTASSIUM  --  3.2*  --   --  3.3*  --  3.1*  3.1*   CHLORIDE  --  113*  --   --  111*  --  109  109   CO2  --  27  --   --  25  --  16*  16*   BUN  --  4*  --   --  6*  --  7  7   CR  --  0.89  --   --  1.01  --  0.99  0.99   ANIONGAP  --  4  --   --  10  --  17*  17*   SOO  --  8.1*  --   --  7.5*  --  8.2*  8.2*   * 117* 104*   < > 97   < > 150*  150*   ALBUMIN  --   --   --   --  3.0*  --  3.5   PROTTOTAL  --   --   --   --  5.4*  --  6.6*   BILITOTAL  --   --   --   --  0.5  --  0.3   ALKPHOS  --   --   --   --  70  --  93   ALT  --   --   --   --  47  --  58*   AST  --   --   --   --  34  --  42    < > = values in this interval not displayed.       Recent Labs   Lab 07/01/22  0753 07/01/22  0503 07/01/22  0010 06/30/22 2014 06/30/22  1817 06/30/22  1143   * 117* 104* 89 110* 101*        Unresulted Labs Ordered in the Past 30 Days of this Admission     Date and Time Order Name Status Description    6/29/2022 10:53 PM Blood Culture Arm, Left Preliminary     6/29/2022 10:53 PM Blood Culture Peripheral Blood Preliminary            Imaging  No results found for this or any previous visit (from the past 24 hour(s)).     I reviewed all new labs and imaging results over the last 24 hours. I personally reviewed no images or EKGs today.    Medications       buPROPion  300 mg Oral QAM     enoxaparin ANTICOAGULANT  40 mg Subcutaneous Q24H     lamoTRIgine  50 mg Oral Daily     potassium chloride  40 mEq Per NG tube Once       Eliceo Jason MD   [facility] Medicine

## 2022-07-01 NOTE — PROGRESS NOTES
Writer updated TELEMED Dr. Alvarado:    Patient appears to be restless, while in her room without talking to her she has been shaking her head back and forth. She has been having intermittent increased respirations, heavy breathing.     MD ordered Zyprexa 5mg IM.

## 2022-07-01 NOTE — PROGRESS NOTES
DEC was not able to fully complete assessment with patient. They will check in again if necessary.

## 2022-07-01 NOTE — PROGRESS NOTES
"Writer received a call from patients friend asking for information r/t patients current medical status. Caller reported being transferred from [facility] and was also told that the patient was at \"[facility].\" Writer informed patient that no information could be given. Caller asked if they were on the information call list. Writer informed the caller that they were not. Caller said, \"thank you, I will pray for her.\"   "

## 2022-07-01 NOTE — PROGRESS NOTES
Writer and staff have taken multiple phone calls from family and friends regarding whether or not patient is at this hospital. Writer did not give any patient information nor did writer tell callers that patient was at this hospital. None of the individuals who have called are on patients contact list. Writer and another nurse did let patient know that she was receiving calls. Patient became anxious at that time, did not seem to want to here the names of those who called. She shut her eyes and stopped verbally responding.

## 2022-07-01 NOTE — PROGRESS NOTES
Patient has been up to chair twice today. She is becoming gradually more alert, but still has episodes where she seems overwhelmed and scared and needs to be reassured. PRN oral zyprexa given once as she became restless and stated she felt anxious. This has helped.   After clarifying contacts with patient, her mother and friend, Mikaela have both talked with her on the phone today. Her Mother states she is in [place], so appreciated an update.     Attempt at DEC assessment is underway at this time.

## 2022-07-01 NOTE — PROGRESS NOTES
Care Management Note:     72 HOUR HOLD START        6/30/22 at 8:25 AM  72 HOUR HOLD END            7/6/22 at 8:25 AM  72 hours hold do not include weekends or holidays    CM following and reviewing EMR daily. Awaiting the completion of the DEC assessment to determine recommendations. CM to to assist as needed based on recommendations from MD and DEC assessment.     Pt is on a 72 hour hold, MD will need to determine if pt will require civil commitment process based on conversation with pt and DEC .    [facility] to discuss next steps in assessment process.   -informed Pt is not medically appropriate for full psychiatric evaluation and to determine civil commitment at this time due to the pt's on-going medical needs currently being addressed.     Appears from [place]- resides in [facility].     CM acquired the Pre-petition Screening Dept's info for [facility]. Confirmed the Pt resides in their district. Informed Brijesh of Pt's 72 hour hold and potential for commitment need. CM will call and notify Brijesh on Tuesday at 8:00am of plan directed by MD and recommendations of DEC.     CM to supply Physician Support Statement documents to MD should provider decide to petition for Civil Commitment with Son     Att: Brijesh  # [phone] Fax: [phone]  Prefer documents are emailed: [email or web address].      DOMINIC Vela  [facility] [phone]   Formerly [facility]  [phone]

## 2022-07-02 ENCOUNTER — TELEPHONE (OUTPATIENT)
Dept: BEHAVIORAL HEALTH | Facility: CLINIC | Age: 27
End: 2022-07-02

## 2022-07-02 LAB
ANION GAP SERPL CALCULATED.3IONS-SCNC: 2 MMOL/L (ref 3–14)
BUN SERPL-MCNC: 8 MG/DL (ref 7–30)
CALCIUM SERPL-MCNC: 8.7 MG/DL (ref 8.5–10.1)
CHLORIDE BLD-SCNC: 109 MMOL/L (ref 94–109)
CO2 SERPL-SCNC: 30 MMOL/L (ref 20–32)
CREAT SERPL-MCNC: 0.84 MG/DL (ref 0.52–1.04)
ERYTHROCYTE [DISTWIDTH] IN BLOOD BY AUTOMATED COUNT: 12.7 % (ref 10–15)
GFR SERPL CREATININE-BSD FRML MDRD: >90 ML/MIN/1.73M2
GLUCOSE BLD-MCNC: 196 MG/DL (ref 70–99)
HCT VFR BLD AUTO: 42.5 % (ref 35–47)
HGB BLD-MCNC: 13.8 G/DL (ref 11.7–15.7)
MCH RBC QN AUTO: 29.5 PG (ref 26.5–33)
MCHC RBC AUTO-ENTMCNC: 32.5 G/DL (ref 31.5–36.5)
MCV RBC AUTO: 91 FL (ref 78–100)
PLATELET # BLD AUTO: 214 10E3/UL (ref 150–450)
POTASSIUM BLD-SCNC: 3.5 MMOL/L (ref 3.4–5.3)
RBC # BLD AUTO: 4.68 10E6/UL (ref 3.8–5.2)
SODIUM SERPL-SCNC: 141 MMOL/L (ref 133–144)
WBC # BLD AUTO: 9.3 10E3/UL (ref 4–11)

## 2022-07-02 PROCEDURE — 250N000011 HC RX IP 250 OP 636

## 2022-07-02 PROCEDURE — 36415 COLL VENOUS BLD VENIPUNCTURE: CPT

## 2022-07-02 PROCEDURE — 250N000013 HC RX MED GY IP 250 OP 250 PS 637

## 2022-07-02 PROCEDURE — 85027 COMPLETE CBC AUTOMATED: CPT

## 2022-07-02 PROCEDURE — 99232 SBSQ HOSP IP/OBS MODERATE 35: CPT

## 2022-07-02 PROCEDURE — 82310 ASSAY OF CALCIUM: CPT

## 2022-07-02 PROCEDURE — 120N000004 HC R&B MS OVERFLOW

## 2022-07-02 RX ADMIN — OLANZAPINE 5 MG: 5 TABLET, ORALLY DISINTEGRATING ORAL at 21:16

## 2022-07-02 RX ADMIN — LAMOTRIGINE 50 MG: 25 TABLET ORAL at 07:59

## 2022-07-02 RX ADMIN — CETIRIZINE HYDROCHLORIDE 10 MG: 10 TABLET, FILM COATED ORAL at 21:16

## 2022-07-02 RX ADMIN — BUPROPION HYDROCHLORIDE 300 MG: 300 TABLET, FILM COATED, EXTENDED RELEASE ORAL at 07:59

## 2022-07-02 RX ADMIN — FLUTICASONE PROPIONATE 2 SPRAY: 50 SPRAY, METERED NASAL at 21:16

## 2022-07-02 RX ADMIN — ENOXAPARIN SODIUM 40 MG: 100 INJECTION SUBCUTANEOUS at 00:12

## 2022-07-02 ASSESSMENT — ACTIVITIES OF DAILY LIVING (ADL)
ADLS_ACUITY_SCORE: 23

## 2022-07-02 NOTE — CONSULTS
[facility] Crisis Reassessment      Monica Morales was reassessed at the request of Dr. Jason for the following reasons: Pt is medically clear. Pt was first seen on 7/1/2022 by Amy Carrasquillo; see the initial assessment note for details.      Patient Presentation    Initial ED presentation details: Pt presented to the ED after being found unresponsive in her car after an intentional overdose on cough syrup. Pt attempted to end her life and required intubation resulting in an admission to the ICU.    Current patient presentation: Pt is cooperative but tearful regarding her present state. It is unclear if her suicide attempt continues to affect her cognition as pt does not express great insight to the events leading to her current state. Pt has little memory but states that she wanted to die after having her birthday party and her mother not showing up.     Changes observed since initial assessment: Pt is more responsive than previous attmpets to engage with a crisis assessment though she continues to show signs of confusion. Pt's response time to question is slow and she has difficulty with her memory. Pt expresses concerns regarding her mental health and her becoming overwhelmed triggering a suicide attempt.      Risk of Harm    Is the patient experiencing current suicidal ideation: Yes. Passive wish to be dead without thoughts or plan.     Does the patient have thoughts of harming others? No      Mental Status Exam   Affect: Flat   Appearance: Appropriate    Attention Span/Concentration: Attentive?    Eye Contact: Variable   Fund of Knowledge: Delayed    Language /Speech Content: Non-fluent   Language /Speech Volume: Soft    Language /Speech Rate/Productions: Slow    Recent Memory: Poor   Remote Memory: Poor   Mood: Sad    Orientation to Person: Yes    Orientation to Place: Yes   Orientation to Time of Day: Yes    Orientation to Date: Yes    Situation (Do they understand why they are here?): Yes    Psychomotor  Behavior: Normal    Thought Content: Clear   Thought Form: Goal Directed       Additional Collateral Information   See initial assessment.      Therapeutic Intervention  The following therapeutic methodologies were employed when working with the patient: Establishing rapport, Active listening and Assess dimensions of crisis. Patient response to intervention: Receptive.      Disposition  Recommended disposition: Inpatient Mental Health      Reviewed case and recommendations with attending provider. Attending Name: Dr. Jason      Attending concurs with disposition: Yes      Patient concurs with disposition: Yes      Final disposition: Inpatient mental health .       Clinical Substantiation of Recommendations  As pt us unable to express insight to her suicide attempt and is not able to communicate her future plans to remain safe she will be admitted to a mental health unit for safety. Pt notes that she has attempted suicide in the past which resulted in hospitalization but she did not continue to engage in treatment long term. Pt will need both therapy and medication management at discharge to help insure continued stability in the community. Pt will need to further discuss her substance use as she was unable to engage in conversation surrounding this as treatment maybe be necessary in this area.       Assessment Details  Total duration spent on the patient case in minutes: .50 hrs     CPT code(s) utilized: 40191 - Psychotherapy for Crisis (Each additional 30 minutes) - 30 min        Amalia Scott, [facility]

## 2022-07-02 NOTE — PROGRESS NOTES
"[facility] Medicine Progress Note  Date of Service: 07/02/2022    Assessment & Plan        Ms. Morales is a 27 year old  female with a history of depression, anxiety, and polysubstance abuse who was transported to the ER via ambulance following a drug overdose.     Assessment/Plan     Polysubstance overdose  It sounds as though the patient has some degree of chronic drug use, with cough syrup being her preferred drug.  Based on what she tells me, the PCP was a one-time thing; she does not use this chronically.  As discussed below, she reported to us that she took these medications in an attempt for self-harm, and that she \"did not want to be alive\".  Toxic encephalopathy has improved or resolved, discussed below.  -Probably at low risk for withdrawal from opioids based upon chronic use history.  Observe.     Acute ventilatory failure  Intubated electively in ED.  She has been stable on mechanical ventilation.  Current minute volume on , rate 14, FiO2 0.40, and PEEP 5 cm is 5.6 L..  She did well on a PSV wean trial earlier 6/30/2022 morning, and was extubated.  She has had no respiratory difficulties following extubation.  She has no oxygen needs.  -Resolved.     New onset seizure probably due to phencyclidine intoxication.  Seizure was witnessed in the EMS rig en route to ED.  ED notes do not describe whether medication was necessary to terminate seizure.  We have seen no seizure activity since admission.  -Monitor for additional seizure.  -Lorazepam is available as needed.    Toxic encephalopathy due to phencyclidine  For the first 24 to 36 hours after extubation, the patient was very confused.  Although she was able to state her name and her date of birth, she was disoriented to her surroundings, and had no recollection of events leading up to her OD and this hospitalization.  She had been intermittently agitated, with some behaviors, specifically " "flinging her arms.  She received olanzapine 5 mg IM x1 on 6/30/2022 night, then I ordered olanzapine as needed on 7/1/2022.  She required only one olanzapine dose, that roughly 24 hours ago.  This morning, the patient is calm, somewhat tearful, oriented, appropriate in conversation, and the confusion and vigilance seen yesterday have resolved.  She asks calmly if she can go home.    -Appears resolved.    Risk for self-harm  After extubation 6/30/2022, when she seems to be oriented and decisional, we asked if her overdose was an attempt at self-harm.  She acknowledged that it was without hesitation, and then told us that she \"did not want to be alive\".  On the morning of 6/30/2022, we put her under a 72-hour hold.  A DEC assessment was ordered, though the patient has not had the cognitive capacity to participate as of yet.  As of this morning, her acute encephalopathy appears resolved.  She can probably participate in a DEC assessment at this point.  -Continue under a 72-hour hold.  The hold expires 7/6/2022 at 0825.  -Continue one-to-one observation.  -DEC assessment reordered now that the patient can participate.     Lactic acidosis  Hypotension  SIRS from phencyclidine toxicity  Initial lactate was 4.1.  The patient had mild hypotension on ED arrival that resolved with modest fluid administration.  Lactate normalized after IVF.  I suspect that these findings were due to opioid intoxication.  There is no evidence of underlying infection by history or exam.  Chest x-ray was clear.  UA was negative.  The patient was started on empiric vancomycin and piperacillin/tazobactam in ED, which I stopped 6/30/2022 as there was no evidence of underlying infection.  She remains afebrile and hemodynamically stable off of antibiotics.  -Resolved.    Leukocytosis  WBC on ED arrival was 20.7 --> 17.6 --> 13.1 --> 9.3 this morning.  As described above, there is no evidence of underlying infection.  This was probably a reactive " "leukocytosis to OD and ventilatory failure.  -Resolved.     Hypokalemia  Potassium was 3.1 on ED arrival.  She has been started on IV fluids containing potassium, though potassium this morning remains low at 3.3.  The potassium chloride 40 mEq p.o. dose I ordered on 6/30/2022 was not given, so she received a dose 7/1/2022.  Potassium is 3.5 this morning.  -Resolved.    Hypernatremia  Hyperchloremia  Both were normal on admission, though on 6/30/2022 morning sodium was 146 and chloride 111.  Abnormalities are probably due to NaCl containing IV fluids she was given in ED and after admission.  IV fluids were stopped 7/1/2022.  Sodium and chloride have now normalized.  -Resolved.    Diet: Room Service  Regular Diet Adult.    DVT Prophylaxis: She is now ambulatory.  Will discontinue enoxaparin.  Mckeon Catheter: Not present.    Central Lines: None  Code Status: Full Code           Discussion: Encephalopathy has either significantly improved or resolved.  She will now be able to participate in a DEC assessment.  She is beginning to ask about discharge.    Disposition: Anticipate discharge in 1 to 2 additional days if cleared for discharge by DEC.    Attestation:  I have reviewed today's vital signs, notes, medications, labs and imaging.  Amount of time spent providing critical care service today: 20 minutes.    Eliceo Jason MD   [facility] Medicine      Interval History   She feels that she \"tossed and turned\" last night, and is a little tired this morning.  She is otherwise feeling well.  Appetite is reduced, and she is not thrilled with some of her food choices, though she denies dysphagia, nausea, vomiting, or abdominal pain.  She denies dyspnea, cough, or chest congestion.  She is asking if she can go home.    Physical Exam   Temp:  [98  F (36.7  C)-98.4  F (36.9  C)] 98.4  F (36.9  C)  Pulse:  [] 101  Resp:  [9-39] 16  BP: (140-175)/() 150/98  SpO2:  [98 %-100 %] 99 %    Weights:   Vitals:    06/30/22 " 0040 07/01/22 0828 07/02/22 0400   Weight: 128.7 kg (283 lb 11.7 oz) 121.3 kg (267 lb 6.7 oz) 122.6 kg (270 lb 4.5 oz)    Body mass index is 41.1 kg/m .    GENERAL: Young appearing female, seated upright in bed, appears confused and fearful.  EYES: Eyes grossly normal to inspection, extraocular movements intact  HENT: Nares patent bilaterally, no discharge.  NECK: Trachea midline.   RESP: No accessory muscle use.  Lungs clear throughout on inspiration and expiration.  Expiration not prolonged, no wheeze.   CV: Regular rate and rhythm, mildly tachycardic.  Normal S1 S2, no murmur or extra sound.  No lower extremity edema.  ABDOMEN: Soft, no guarding.  Liver and spleen not enlarged, no masses palpable.  Bowel sounds positive.  MS: No bony deformities noted.  No red or inflamed joints.  SKIN: Warm and dry, no rashes.  NEURO: Awake, alert, softly conversant, and appropriate in conversation.  Cranial nerves III - XII grossly intact.  No gross motor or sensory deficits.  Tone is normal.  There are no abnormal movements.  PSYCH: Awake, conversant.  She answers my questions thoughtfully and completely.  She expresses no hallucinations or delusions.  Affect is flat and mildly tearful.      Data   Recent Labs   Lab 07/02/22  0409 07/01/22  1127 07/01/22  0753 07/01/22  0503 06/30/22  0734 06/30/22  0424 06/29/22  2141 06/29/22  2135   WBC 9.3  --   --  13.1*  --  17.6*  --  20.7*   HGB 13.8  --   --  13.0  --  13.2  --  15.2   MCV 91  --   --  89  --  88  --  92     --   --  215  --  247  --  330     --   --  144  --  146*  --  142  142   POTASSIUM 3.5  --   --  3.2*  --  3.3*  --  3.1*  3.1*   CHLORIDE 109  --   --  113*  --  111*  --  109  109   CO2 30  --   --  27  --  25  --  16*  16*   BUN 8  --   --  4*  --  6*  --  7  7   CR 0.84  --   --  0.89  --  1.01  --  0.99  0.99   ANIONGAP 2*  --   --  4  --  10  --  17*  17*   SOO 8.7  --   --  8.1*  --  7.5*  --  8.2*  8.2*   * 117* 119* 117*   < >  97   < > 150*  150*   ALBUMIN  --   --   --   --   --  3.0*  --  3.5   PROTTOTAL  --   --   --   --   --  5.4*  --  6.6*   BILITOTAL  --   --   --   --   --  0.5  --  0.3   ALKPHOS  --   --   --   --   --  70  --  93   ALT  --   --   --   --   --  47  --  58*   AST  --   --   --   --   --  34  --  42    < > = values in this interval not displayed.       Recent Labs   Lab 07/02/22  0409 07/01/22  1127 07/01/22  0753 07/01/22  0503 07/01/22  0010 06/30/22 2014   * 117* 119* 117* 104* 89        Unresulted Labs Ordered in the Past 30 Days of this Admission     Date and Time Order Name Status Description    6/29/2022 10:53 PM Blood Culture Arm, Left Preliminary     6/29/2022 10:53 PM Blood Culture Peripheral Blood Preliminary            Imaging  No results found for this or any previous visit (from the past 24 hour(s)).     I reviewed all new labs and imaging results over the last 24 hours. I personally reviewed no images or EKGs today.    Medications       buPROPion  300 mg Oral QAM     enoxaparin ANTICOAGULANT  40 mg Subcutaneous Q24H     lamoTRIgine  50 mg Oral Daily       Eliceo Jason MD   [facility] Medicine
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
## 2022-07-02 NOTE — PROGRESS NOTES
Patient frequently asks staff when she can go home. Reminded that she is on a hold and will need most likely transfer to a mental health facility.   She is still slow to respond and pauses for long periods of time in the middle of a conversation.   She is currently visiting with friends who brought her comfort items from home.   SR on monitor. BP remains elevated; no current PRNs for this. She remains on room air. Voice is hoarse since extubation, but lung sounds are clear.   No PRNs given this shift for anxiety. She was up to chair and ambulated within room with minimal SBA.

## 2022-07-02 NOTE — PLAN OF CARE
"Goal Outcome Evaluation:    Plan of Care Reviewed With: patient     Overall Patient Progress: improving    Outcome Evaluation: Patient has been alert most of today and intermittently tearful/ sad and wants to go home. DEC able to make assessment and recommendations are in note.      Problem: Plan of Care - These are the overarching goals to be used throughout the patient stay.    Goal: Optimal Comfort and Wellbeing  Intervention: Provide Person-Centered Care  Recent Flowsheet Documentation  Taken 7/2/2022 1515 by Maggi Lucas RN  Trust Relationship/Rapport:    empathic listening provided    thoughts/feelings acknowledged    care explained    reassurance provided  Taken 7/2/2022 0815 by Maggi Lucas RN  Trust Relationship/Rapport:    empathic listening provided    thoughts/feelings acknowledged       Problem: Adjustment to Illness (Overdose)  Goal: Optimal Coping  Outcome: Ongoing, Progressing    Diversional activities encouraged. Coloring books given to patient.     Problem: Suicide Risk  Goal: Absence of Self-Harm  Outcome: Ongoing, Progressing     Reason for admission was, again, explained to patient after she voiced wanting to go home and that she has a ride. She currently denies taking substances to end her life and states, \"I was just having a bad day and I would never even hurt a fly\". It was explained to her that the hospital is responsible for her at this time and she is on a hold until we can get her mental health assistance.  "

## 2022-07-02 NOTE — PROGRESS NOTES
Patient rested well until around 3am. Has been up to chair and back to bed. Impulsive, gets OOB quickly. Up to BR with SBA. Steady on feet. Weepy and wants to go home today. Has denied pain.

## 2022-07-03 PROCEDURE — 99232 SBSQ HOSP IP/OBS MODERATE 35: CPT | Performed by: HOSPITALIST

## 2022-07-03 PROCEDURE — 250N000013 HC RX MED GY IP 250 OP 250 PS 637

## 2022-07-03 PROCEDURE — 120N000004 HC R&B MS OVERFLOW

## 2022-07-03 RX ORDER — LAMOTRIGINE 25 MG/1
25 TABLET ORAL DAILY
Status: DISCONTINUED | OUTPATIENT
Start: 2022-07-04 | End: 2022-07-06 | Stop reason: HOSPADM

## 2022-07-03 RX ORDER — ACETAMINOPHEN 500 MG
1000 TABLET ORAL EVERY 6 HOURS PRN
Status: ON HOLD | COMMUNITY
End: 2022-07-14

## 2022-07-03 RX ADMIN — LAMOTRIGINE 50 MG: 25 TABLET ORAL at 08:07

## 2022-07-03 RX ADMIN — BUPROPION HYDROCHLORIDE 300 MG: 300 TABLET, FILM COATED, EXTENDED RELEASE ORAL at 08:07

## 2022-07-03 RX ADMIN — OLANZAPINE 5 MG: 5 TABLET, ORALLY DISINTEGRATING ORAL at 03:16

## 2022-07-03 RX ADMIN — OLANZAPINE 5 MG: 5 TABLET, ORALLY DISINTEGRATING ORAL at 23:59

## 2022-07-03 ASSESSMENT — ACTIVITIES OF DAILY LIVING (ADL)
ADLS_ACUITY_SCORE: 23

## 2022-07-03 NOTE — PLAN OF CARE
Problem: Gas Exchange Impaired  Goal: Optimal Gas Exchange  Outcome: Ongoing, Progressing   On RA saturation in upper 90's. LS CTA.      Problem: Suicide Risk  Goal: Absence of Self-Harm  Outcome: Ongoing, Progressing   Continues to have 1:1 observation. Absence of Self-Harm noted. Patient has not verbalized any intent to harm self.
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
## 2022-07-03 NOTE — CONSULTS
Care Management:     Per the DEC Assessment the Patient is appropriate for Inpatient Mental Health.  Provided MD with Physician Support Statement documents for civil commitment.     The Patient remains on a 72 hour hold.     72 HOUR HOLD START  6/30/22 at 8:25 AM     72 HOUR HOLD END       7/6/22 at 8:25 AM  72 hours hold do not include weekends or holidays      Suni Camacho RN, Care Coordinator [phone]

## 2022-07-03 NOTE — PROGRESS NOTES
Friend dropped off [employer] ate 2 containers of chicken nuggets and 2 containers of fries plus small shake and large drink
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
## 2022-07-03 NOTE — PROGRESS NOTES
"Patient restless in bed. Quickly getting up and setting off bed alarm. Writer assisted patient to the BR to void. Assisted back to bed. Patient is tearful. Patient given Zyprexa 5 mg ODT. Vital signs:  Temp: 98.6  F (37  C) Temp src: Oral BP: (!) 149/97 Pulse: 103   Resp: 18 SpO2: 96 % O2 Device: None (Room air)   Height: 172.7 cm (5' 8\") Weight: 124.5 kg (274 lb 7.6 oz)  Estimated body mass index is 41.73 kg/m  as calculated from the following:    Height as of this encounter: 1.727 m (5' 8\").    Weight as of this encounter: 124.5 kg (274 lb 7.6 oz).        "

## 2022-07-03 NOTE — PLAN OF CARE
Pts blood pressure has been elevated.  Md aware-stated continue to monitor.  Pt is tolerating food and fluids.  No complaints of pain.  Pt showered today with this writer present-she has been calm and cooperative other than crying this am(see note).  This writer has been in constant view of pt.  Enc to call for concerns.

## 2022-07-03 NOTE — PROGRESS NOTES
"Patient was getting up and pacing and sitting and pacing. Writer asked patient how she was feeling. Patient stated, \"I have anxiety problems.\" Zyprexa 5 mg ODT was given. Patient is now resting in bed with eyes closed.   "

## 2022-07-03 NOTE — PROGRESS NOTES
"[facility] Medicine Progress Note  Date of Service: 07/03/2022    Assessment & Plan        Ms. Morales is a 27 year old  female with a history of depression, anxiety, and polysubstance abuse who was transported to the ER via ambulance following a drug overdose.  Patient was found on the side of the road unresponsive after previously making both homicidal and suicidal statements towards family and friends.    Assessment/Plan     Polysubstance overdose  It sounds as though the patient has some degree of chronic drug use, with cough syrup being her preferred drug.  Based on what she tells me, the PCP was a one-time thing; she does not use this chronically.  As discussed below, she reported to us that she took these medications in an attempt for self-harm, and that she \"did not want to be alive\".  Toxic encephalopathy has improved or resolved, discussed below.  -Probably at low risk for withdrawal from opioids based upon chronic use history.  Observe.     Acute ventilatory failure  Intubated electively in ED.  She has been stable on mechanical ventilation.  Current minute volume on , rate 14, FiO2 0.40, and PEEP 5 cm is 5.6 L..  She did well on a PSV wean trial earlier 6/30/2022 morning, and was extubated.  She has had no respiratory difficulties following extubation.  She has no oxygen needs.  -Resolved.     New onset seizure probably due to phencyclidine intoxication.  Seizure was witnessed in the EMS rig en route to ED.  ED notes do not describe whether medication was necessary to terminate seizure.  We have seen no seizure activity since admission.  -Monitor for additional seizure.  -Lorazepam is available as needed.      Toxic encephalopathy due to phencyclidine  For the first 24 to 36 hours after extubation, the patient was very confused.  Although she was able to state her name and her date of birth, she was disoriented to her surroundings, and had " no recollection of events leading up to her OD and this hospitalization.  She had been intermittently agitated, with some behaviors, specifically flinging her arms.  She received olanzapine 5 mg IM x1 on 6/30/2022 night, then I ordered olanzapine as needed on 7/1/2022.  She required only one olanzapine dose, that roughly 24 hours ago.  This morning, the patient is calm, somewhat tearful, oriented, appropriate in conversation, and the confusion and vigilance seen yesterday have resolved.  She asks calmly if she can go home.    -Appears resolved, although she continues to lack insight into her suicidal intents and just wishes to go home.    Major depressive disorder versus bipolar depression versus polysubstance abuse  Patient had been doing relatively well for most of 2022 and had been working.  She previously has been treated for possible bipolar versus major depression and was supposed to be on Lamictal and bupropion.  She previously had some kind of resolved on its own.  Over the 10 days leading up to the patient's presentation, she was increasingly erratic and threatening towards others.  She made statements of suicidal ideation saying she would slit her neck or crash her car.  Additionally she threatened to lure her mother to [place] and kill her there.  Per mother, patient was supposed to receive an inheritance from her grandmother but the mother has refused to give it to the patient due to the mother feeling that she was using drugs.  Patient previously has been maintained on Lamictal 50 mg as well as bupropion.  These were initially both resumed here in the hospital  - HOLD bupropion given that patient had a seizure in the emergency department.  This is likely more related to her phencyclidine intoxication but seems prudent to hold bupropion for the time being.  It can likely be resumed in the near future.  - Per friends patient was not taking her medications.  I have therefore decreased the Lamictal back down  "to 25 mg given that she would need to retitrate on it.  - Further medication management per psychiatry once she is admitted to inpatient psychiatric [facility]     Risk for self-harm  After extubation 6/30/2022, when she seems to be oriented and decisional, we asked if her overdose was an attempt at self-harm.  She acknowledged that it was without hesitation, and then told us that she \"did not want to be alive\".  On the morning of 6/30/2022, we put her under a 72-hour hold.  A DEC assessment was ordered, though the patient has not had the cognitive capacity to participate as of yet.  As of this morning, her acute encephalopathy appears resolved.  She has been seen by DEC assessment and they advised \"As pt us unable to express insight to her suicide attempt and is not able to communicate her future plans to remain safe she will be admitted to a mental health unit for safety. Pt notes that she has attempted suicide in the past which resulted in hospitalization but she did not continue to engage in treatment long term. Pt will need both therapy and medication management at discharge to help insure continued stability in the community. Pt will need to further discuss her substance use as she was unable to engage in conversation surrounding this as treatment maybe be necessary in this area. \"  -Continue under a 72-hour hold.  The hold expires 7/6/2022 at 0825.  -Continue one-to-one observation.  -Per mental health intake, they have requested that we petition for commitment prior to her being transferred to psychiatric [facility].  Therefore, I have completed petition for commitment under [facility] and given this document to care management for submission.  Of note, patient most recently lived in [facility] but per care management they have confirmed with [facility] that she has an existing case open with [facility] and she remains under management with [facility] and confirmed that her case should come through " [facility].     Lactic acidosis  Hypotension  SIRS from phencyclidine toxicity  initial lactate was 4.1.  The patient had mild hypotension on ED arrival that resolved with modest fluid administration.  Lactate normalized after IVF.  I suspect that these findings were due to opioid intoxication.  There is no evidence of underlying infection by history or exam.  Chest x-ray was clear.  UA was negative.  The patient was started on empiric vancomycin and piperacillin/tazobactam in ED, which I stopped 6/30/2022 as there was no evidence of underlying infection.  She remains afebrile and hemodynamically stable off of antibiotics.  -Resolved.    Leukocytosis  WBC on ED arrival was 20.7 --> 17.6 --> 13.1 --> 9.3 this morning.  As described above, there is no evidence of underlying infection.  This was probably a reactive leukocytosis to OD and ventilatory failure.  -Resolved.     Hypokalemia  Potassium was 3.1 on ED arrival.  She has been started on IV fluids containing potassium, though potassium this morning remains low at 3.3.  The potassium chloride 40 mEq p.o. dose I ordered on 6/30/2022 was not given, so she received a dose 7/1/2022.  Potassium was 3.5 on 7/2/2022  -Resolved.    Hypernatremia  Hyperchloremia  Both were normal on admission, though on 6/30/2022 morning sodium was 146 and chloride 111.  Abnormalities are probably due to NaCl containing IV fluids she was given in ED and after admission.  IV fluids were stopped 7/1/2022.  Sodium and chloride have now normalized.  -Resolved.    Diet: Room Service  Regular Diet Adult.    DVT Prophylaxis: She is now ambulatory.  Will discontinue enoxaparin.  Mckeon Catheter: Not present.    Central Lines: None  Code Status: Full Code           Discussion: Awaiting inpatient psychiatric admission    Disposition: Anticipate discharge in 3-4 additional days to psychiatry    Total time: 45minutes with 25 minutes spent with coordination of care and counseling reviewing plan of care  with patient and also social work, also filling out petition for commitment      Demetrius Rivas MD  [facility] Medicine Service          Interval History   Patient denies any pain.  Such as she is feeling well.  She has some friends visiting.     Physical Exam   Temp:  [97.7  F (36.5  C)-98.6  F (37  C)] 98.5  F (36.9  C)  Pulse:  [] 97  Resp:  [10-24] 16  BP: (135-163)/() 141/93  SpO2:  [96 %-100 %] 99 %    Weights:   Vitals:    07/01/22 0828 07/02/22 0400 07/03/22 0315   Weight: 121.3 kg (267 lb 6.7 oz) 122.6 kg (270 lb 4.5 oz) 124.5 kg (274 lb 7.6 oz)    Body mass index is 41.73 kg/m .    GENERAL: Young appearing female, seated upright in bed, appears confused and fearful.  EYES: Eyes grossly normal to inspection, extraocular movements intact  HENT: Nares patent bilaterally, no discharge.  NECK: Trachea midline.   RESP: No accessory muscle use.  Lungs clear throughout on inspiration and expiration.  Expiration not prolonged, no wheeze.   CV: Regular rate and rhythm, mildly tachycardic.  Normal S1 S2, no murmur or extra sound.  No lower extremity edema.  ABDOMEN: Soft, no guarding.  Liver and spleen not enlarged, no masses palpable.  Bowel sounds positive.  MS: No bony deformities noted.  No red or inflamed joints.  SKIN: Warm and dry, no rashes.  NEURO: Awake, alert, softly conversant, and appropriate in conversation.  Cranial nerves III - XII grossly intact.  No gross motor or sensory deficits.  Tone is normal.  There are no abnormal movements.  PSYCH: Awake, conversant.  However she is not able to repeat back complicated decisions such as risk and benefits regarding to her going home.  Affect is flat and mildly tearful.      Data   Recent Labs   Lab 07/02/22  0409 07/01/22  1127 07/01/22  0753 07/01/22  0503 06/30/22  0734 06/30/22  0424 06/29/22  2141 06/29/22  2135   WBC 9.3  --   --  13.1*  --  17.6*  --  20.7*   HGB 13.8  --   --  13.0  --  13.2  --  15.2   MCV 91  --   --  89  --  88  --  92      --   --  215  --  247  --  330     --   --  144  --  146*  --  142  142   POTASSIUM 3.5  --   --  3.2*  --  3.3*  --  3.1*  3.1*   CHLORIDE 109  --   --  113*  --  111*  --  109  109   CO2 30  --   --  27  --  25  --  16*  16*   BUN 8  --   --  4*  --  6*  --  7  7   CR 0.84  --   --  0.89  --  1.01  --  0.99  0.99   ANIONGAP 2*  --   --  4  --  10  --  17*  17*   SOO 8.7  --   --  8.1*  --  7.5*  --  8.2*  8.2*   * 117* 119* 117*   < > 97   < > 150*  150*   ALBUMIN  --   --   --   --   --  3.0*  --  3.5   PROTTOTAL  --   --   --   --   --  5.4*  --  6.6*   BILITOTAL  --   --   --   --   --  0.5  --  0.3   ALKPHOS  --   --   --   --   --  70  --  93   ALT  --   --   --   --   --  47  --  58*   AST  --   --   --   --   --  34  --  42    < > = values in this interval not displayed.       Recent Labs   Lab 07/02/22  0409 07/01/22  1127 07/01/22  0753 07/01/22  0503 07/01/22  0010 06/30/22 2014   * 117* 119* 117* 104* 89        Unresulted Labs Ordered in the Past 30 Days of this Admission     Date and Time Order Name Status Description    6/29/2022 10:53 PM Blood Culture Arm, Left Preliminary     6/29/2022 10:53 PM Blood Culture Peripheral Blood Preliminary            Imaging  No results found for this or any previous visit (from the past 24 hour(s)).     I reviewed all new labs and imaging results over the last 24 hours. I personally reviewed no images or EKGs today.    Medications       buPROPion  300 mg Oral QAM     [START ON 7/4/2022] lamoTRIgine  25 mg Oral Daily

## 2022-07-03 NOTE — PROGRESS NOTES
"Writer received a phone call from patients mother. Patients mother reported that the patients grandmother had passed away leaving money to patients mother that was to be given to the patient. Patients mother was here from out of state to purchase a car for the patient but would not give the $20,000.00 to the patient as the mother felt that the patient was using drugs. Mother also reports that the patient stated, \"fine, I am just going to kill myself and it will be all your fault.\" The mother reports that she left [place] two days early, as the patients anger and disrespect toward her was escalating. The mother reports that the drug abuse it not a new occurrence but the suicide threat is.   "

## 2022-07-04 PROCEDURE — 99233 SBSQ HOSP IP/OBS HIGH 50: CPT | Performed by: INTERNAL MEDICINE

## 2022-07-04 PROCEDURE — 250N000011 HC RX IP 250 OP 636

## 2022-07-04 PROCEDURE — 120N000004 HC R&B MS OVERFLOW

## 2022-07-04 PROCEDURE — 250N000013 HC RX MED GY IP 250 OP 250 PS 637: Performed by: HOSPITALIST

## 2022-07-04 PROCEDURE — 250N000013 HC RX MED GY IP 250 OP 250 PS 637: Performed by: INTERNAL MEDICINE

## 2022-07-04 PROCEDURE — 250N000011 HC RX IP 250 OP 636: Performed by: INTERNAL MEDICINE

## 2022-07-04 PROCEDURE — 250N000013 HC RX MED GY IP 250 OP 250 PS 637

## 2022-07-04 RX ORDER — OLANZAPINE 10 MG/2ML
10 INJECTION, POWDER, FOR SOLUTION INTRAMUSCULAR ONCE
Status: COMPLETED | OUTPATIENT
Start: 2022-07-04 | End: 2022-07-04

## 2022-07-04 RX ORDER — LORAZEPAM 2 MG/ML
1-2 INJECTION INTRAMUSCULAR
Status: DISCONTINUED | OUTPATIENT
Start: 2022-07-04 | End: 2022-07-04

## 2022-07-04 RX ORDER — LORAZEPAM 2 MG/ML
2 INJECTION INTRAMUSCULAR ONCE
Status: COMPLETED | OUTPATIENT
Start: 2022-07-04 | End: 2022-07-04

## 2022-07-04 RX ORDER — LORAZEPAM 1 MG/1
1-2 TABLET ORAL
Status: DISCONTINUED | OUTPATIENT
Start: 2022-07-04 | End: 2022-07-04

## 2022-07-04 RX ORDER — LORAZEPAM 1 MG/1
1-2 TABLET ORAL
Status: DISCONTINUED | OUTPATIENT
Start: 2022-07-04 | End: 2022-07-06 | Stop reason: HOSPADM

## 2022-07-04 RX ORDER — OLANZAPINE 10 MG/2ML
10 INJECTION, POWDER, FOR SOLUTION INTRAMUSCULAR 2 TIMES DAILY PRN
Status: DISCONTINUED | OUTPATIENT
Start: 2022-07-04 | End: 2022-07-06 | Stop reason: HOSPADM

## 2022-07-04 RX ORDER — LORAZEPAM 2 MG/ML
1-2 INJECTION INTRAMUSCULAR
Status: DISCONTINUED | OUTPATIENT
Start: 2022-07-04 | End: 2022-07-06 | Stop reason: HOSPADM

## 2022-07-04 RX ADMIN — LAMOTRIGINE 25 MG: 25 TABLET ORAL at 10:36

## 2022-07-04 RX ADMIN — OLANZAPINE 5 MG: 5 TABLET, ORALLY DISINTEGRATING ORAL at 04:47

## 2022-07-04 RX ADMIN — ONDANSETRON 4 MG: 4 TABLET, ORALLY DISINTEGRATING ORAL at 04:47

## 2022-07-04 RX ADMIN — LORAZEPAM 2 MG: 1 TABLET ORAL at 17:19

## 2022-07-04 RX ADMIN — LORAZEPAM 2 MG: 2 INJECTION INTRAMUSCULAR; INTRAVENOUS at 14:50

## 2022-07-04 RX ADMIN — OLANZAPINE 10 MG: 10 INJECTION, POWDER, FOR SOLUTION INTRAMUSCULAR at 14:37

## 2022-07-04 ASSESSMENT — ACTIVITIES OF DAILY LIVING (ADL)
ADLS_ACUITY_SCORE: 23

## 2022-07-04 NOTE — PROGRESS NOTES
"Patient awake and tearful.  Stated \"Im sorry\".  Reassurance given.  Also stated \"My anxiety is starting to climb again\".  Ativan 2 mg. Po given at this time.  "

## 2022-07-04 NOTE — TELEPHONE ENCOUNTER
Inpatient Bed Call Log 7/4/2022 Morning done at 7:05a    Adults:             72HH    [facility] is posting 0 beds.     [facility] is posting 0 beds.    [facility] is posting 0 beds.    [facility] is posting 0 beds.    [facility] is posting 0 beds.    [facility] is posting 0 beds.    [facility] is posting 0 beds.    [facility] is posting 0 beds.      [facility] is posting 1 beds. Mixed unit 12+. Low acuity only.     [facility] is positing 0 beds. No aggression.     [facility] is posting 0 beds.     [facility] is posting 0 beds. Low acuity only.    [facility] is posting 1 beds.    [facility] is posting 1 beds. Low acuity.     [facility] is posting 2 beds. 72 hr hold required.     [facility] is posting 0 beds.        is posting 0 beds. Vol only, No Hx of aggression, violence or assault. No sexual offenders. No 72 hr holds.    [facility] is posting 5 beds. (Must have the cognitive ability to do programming. No aggressive or violent behavior or recent HX in the last 2 yrs. MH must be primary.)    [facility] is posting 0 beds. Low acuity only. Violence and aggression capped.     [facility] is posting 0 beds. Low acuity, Neg Covid.     [facility] is posting 0 beds. Covid neg. Vol only. Combined adolescent and adult unit. No aggressive or violent behavior. No registered sex offenders.     [facility] is posting 8 beds. Call for details.      [facility] is posting 4 beds.    7:40am No appropriate bed available.

## 2022-07-04 NOTE — PROGRESS NOTES
"Shift events:  1420 entered room, patient asking for assistance to make a phone call to saniya.  \"I have to find out if I have a place to live when I get out of here\".  During conversation with saniya patient asked nurse if she was going to be able to leave and go home today.  Explained to patient that discharge was not going to happen today due to holiday.  Patient became very agitated, screaming, \"I have to go home, I have to go to work.  I'm going to kill myself in here if you don't let me leave\".  Continued to scream at staff, Code 21 called.  Patient then attempted to use light bulb from the lamp in the room to harm self.  Two ED docs, several nurses and 2 security guards were present.  Security held her arms down so she could not harm herself.  Patient continued to yell out repeatedly that she wanted to kill herself.  Patient was assisted back to bed willingly and was given IM Zyprexa.  About 10 minutes later the patient ran into the bathroom and locked the door despite 2 security guards and 2 nurses present in room within 3 feet.  [place] loud banging noises from bathroom.  Eventually opened up bathroom door with emergency key and witnessed patient standing with shower head in hand attempting to spray water towards the wires from the light fixture which were hanging out of the outlet which she had in her other hand.  Light fixtures and commode were broken with glass over floor.  Brought back to bed by security staff and IM Ativan was administered.      After patient was medicated for 45 minutes, DEC was called and reassessed patient.  Still is on placement list, currently at number [phone].      ANS and administration were called and updated on situation and it was agreed that patient be moved to a safe room in ED with ICU nursing staff providing 1:1 care until placement could be found in a mental health facility.     Patient continues to lie in bed calmly with eyes closed at this time.  "
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
## 2022-07-04 NOTE — PROGRESS NOTES
Pt had an episode of agitation around 0430am,  where she started to hit the walls of the bathroom.  Asked the patient how she was feeling and the patient stopped, sat down on the chair in the room and started crying.  Pt then started to have coughing and gaging episodes.  Offered the pt medications to aid with both the anxiety/aggitation the patient stated and the nausea.  Pt has been calm and relaxed since.

## 2022-07-04 NOTE — PROGRESS NOTES
Patient alert, pleasant at this time, conversation with staff about croc shoes and hospital food.  Sitting in bed eating supper.  Voided on commode earlier.

## 2022-07-04 NOTE — PROGRESS NOTES
Patient transferred to ED room 5 accompanied by 2 security personnel and writer.  All belongings and clothing locked in ED drawer for room 5.

## 2022-07-04 NOTE — PROGRESS NOTES
Patient asked writer why she was moved to ED room 5.  Explained that it was a safer room for her to be in.  Wanted to know what the plan was.  Explained that DEC was working on placement at a mental health facility where she could get the help she needed.  Accepted this response and is watching movie on laptop.

## 2022-07-05 LAB
BACTERIA BLD CULT: NO GROWTH
BACTERIA BLD CULT: NO GROWTH

## 2022-07-05 PROCEDURE — 250N000013 HC RX MED GY IP 250 OP 250 PS 637: Performed by: HOSPITALIST

## 2022-07-05 PROCEDURE — 250N000013 HC RX MED GY IP 250 OP 250 PS 637: Performed by: INTERNAL MEDICINE

## 2022-07-05 PROCEDURE — 250N000013 HC RX MED GY IP 250 OP 250 PS 637

## 2022-07-05 PROCEDURE — 200N000001 HC R&B ICU

## 2022-07-05 PROCEDURE — 99232 SBSQ HOSP IP/OBS MODERATE 35: CPT | Performed by: INTERNAL MEDICINE

## 2022-07-05 RX ADMIN — CETIRIZINE HYDROCHLORIDE 10 MG: 10 TABLET, FILM COATED ORAL at 10:33

## 2022-07-05 RX ADMIN — LAMOTRIGINE 25 MG: 25 TABLET ORAL at 10:14

## 2022-07-05 RX ADMIN — FLUTICASONE PROPIONATE 2 SPRAY: 50 SPRAY, METERED NASAL at 10:14

## 2022-07-05 RX ADMIN — LORAZEPAM 2 MG: 1 TABLET ORAL at 03:49

## 2022-07-05 RX ADMIN — OLANZAPINE 5 MG: 5 TABLET, ORALLY DISINTEGRATING ORAL at 11:45

## 2022-07-05 RX ADMIN — LORAZEPAM 2 MG: 1 TABLET ORAL at 12:21

## 2022-07-05 RX ADMIN — LORAZEPAM 2 MG: 1 TABLET ORAL at 10:22

## 2022-07-05 RX ADMIN — LORAZEPAM 2 MG: 1 TABLET ORAL at 14:32

## 2022-07-05 ASSESSMENT — ACTIVITIES OF DAILY LIVING (ADL)
ADLS_ACUITY_SCORE: 23

## 2022-07-05 NOTE — PLAN OF CARE
Received a call from Cindy informing that the patient is under a [facility] hold starting 7/5/22 @ 1623.    Maggi Ribeiro RN on 7/5/2022 at 4:32 PM

## 2022-07-05 NOTE — PLAN OF CARE
"Sent page to Dr. Mccoy to address if it is or isn't ok for patient to have her personal laptop in the ED safe room. Patient has been talking via video to someone. Patient has been calm and cooperative so far today. No aggressive behaviors. Has taken PO medications willingly.  Maggi Ribeiro RN on 7/5/2022 at 2:59 PM    Patient asking why she needs to be in this room. Reminded her about yesterday's events. Patient requesting to speak with her doctor. Informed patient that a rounding doctor will see her today. Patient wants to know the plan. States \"I'm ready to go\". Reassurance given. Patient resting comfortably.  Maggi Ribeiro RN on 7/5/2022 at 3:48 PM    "

## 2022-07-05 NOTE — PROGRESS NOTES
Triage & Transition Services, Extended Care     [facility]  July 5, 2022    Monica is followed related to 72 Hour Hold: Commitment petition has been accepted by [facility].  . Please see initial DEC Crisis Assessment completed for complete assessment information. Medical record is reviewed. Pt has continued to be board in ED room for safety. Reports desire to leave.   While patient is in the ED, care team is working towards Demonstrate Absence of Suicide Behavior for at least 24 hours.     There are not significant status changes. Full DEC Reassessment is not recommended at this time. Extended Care continues to be available for review of changes to initial crisis state resulting in this encounter.       Extended Care will follow and meet with patient/family/care team as able or requested.     Stacey Fitzgerald, [facility], Extended Care   [phone]
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
## 2022-07-05 NOTE — PLAN OF CARE
Goal Outcome Evaluation:      No major events to report overnight.   Pt has been calm and cooperative.

## 2022-07-05 NOTE — PLAN OF CARE
"Patient slept until 1015. Ambulated to bathroom with staff. Ate breakfast. Requested to shower. Said \"I know I scared you guys yesterday but I'd really like to take a shower\"  Informed patient that there is no shower but informed patient I'd get her hygiene supplies to wash up in her room. Patient proceeded take off all her clothes except for her bra. Walking around in room naked while waiting for hygiene supplies. Patient washed up independently and applied petroleum jelly to dry skin. PRN Ativan and Zyprexa given for anxiety.   Patient talked to Brooke Swanson from [facility]--see care management note.    Maggi Ribeiro RN on 7/5/2022 at 12:26 PM    "
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
## 2022-07-05 NOTE — TELEPHONE ENCOUNTER
Inpatient Bed Call Log 7/5/2022 Morning done at 8:02a    Adults:                      72HH    [facility] is posting 0 beds.     [facility] is posting 0 beds.    [facility] is posting 0 beds.    [facility] is posting 0 beds.    [facility] is posting 0 beds.    [facility] is posting 0 beds.    [facility] is posting 0 beds.    [facility] is posting 0 beds.      [facility] is posting 0 beds. Mixed unit 12+. Low acuity only.     [facility] is positing 0 beds. No aggression.     [facility] is posting 0 beds.     [facility] is posting 0 beds. Low acuity only.    [facility] is posting 0 beds.    [facility] is posting 0 beds. Low acuity.     [facility] is posting 2 beds. 72 hr hold required.     [facility] is posting 3 beds.       [facility] is posting 0 beds. Vol only, No Hx of aggression, violence or assault. No sexual offenders. No 72 hr holds.    [facility] is posting 3 beds. (Must have the cognitive ability to do programming. No aggressive or violent behavior or recent HX in the last 2 yrs. MH must be primary.)    [facility] is posting 0 beds. Low acuity only. Violence and aggression capped.     [facility] is posting 0 beds. Low acuity, Neg Covid.     [facility] is posting 0 beds. Covid neg. Vol only. Combined adolescent and adult unit. No aggressive or violent behavior. No registered sex offenders.     [facility] is posting 8 beds. Call for details.      [facility] is posting 4 beds.    8:15am There are no appropriate beds available at this time.

## 2022-07-05 NOTE — PROGRESS NOTES
CLINICAL NUTRITION SERVICES    Reviewed nutrition risk factors due to LOS. Pt is tolerating diet, eating well per nursing documentation. No nutrition issues identified at this time. RD will follow via rounds at this time, unless consulted.    Teressa Cooley RDN, LD  Clinical Dietitian  Office: [phone]  Weekend pager: [phone]

## 2022-07-05 NOTE — PROGRESS NOTES
Care Management Note:     72 HOUR HOLD START        22 at 8:25 AM  72 HOUR HOLD END            22 at 8:25 AM  72 hours hold do not include weekends or holidays    MD completed the Support of Civil Commitment documentation forms on [place] 7/3.    Aware Inpatient Mental placement is being pursued per DEC assessment.   No bed has been available per  Behavioral Health. Pt remains on list.     [facility]  Discussed events of patient that occurred over the  & .   Brijesh briefly reviewed documents from Provider. Aware Pt's hold  on 22 at 8:25am.     MD Support Documents sent to the [facility] Pre-petition screening department   Att: Brijesh  # [phone] Fax: [phone]  Prefer documents are emailed: [email or web address].      Sending file to JAYCEE Cee PrePetition Screener at [facility] to proceed with assessment. # [phone]  Screening department has access to [facility]'s EMR-will be able to review Pt's medical record.   No faxed required.     CM to await call from Patricia to proceed with next step in Civil Commitment process.   Patricia requested updated copy of [facility] for Judicial Commitment form completed. IGNACIA will notify MD.     Attempted to confirm the Pt's residence to establish the County to assume Commitment.   Pt has not reported her current residence to county:   [address] [place] [address]. [place] [id number]    [place] is being paid through [facility].   Since there is no registration/documentation to confirm that the Pt is a [facility] resident-they are deferring back to [facility].    IGNACIA spoke with Patricia. Will proceed with [facility] and will update with any further updates once directed.     Update 8815--Received call from Patricia who confirmed [facility] will proceed with Commitment Petition.  Patricia spoke with the Pt via phone to complete assessment.     Pre-Petition  department reported after review they will support [place] Petition only. Not [place] based on Pt's hx. MD informed. [facility] filed Petition.     Received call from Ban Harrison-  [facility] Attorney's Office . Inquired if MD will support [place] proceedings. Requested MD to confirm and complete updated [place] documentation. Sent documents to MD's attention.     PLAN:   Patricia reported [facility] does not foresee any concern with obtaining District Court Hold by 8:20am 07/06/22.     [facility] will notify ICU Team of the Preliminary Hearing and Commitment Hearing once issued in the am.     CM will remain available to assist as needed to IDT team.     DOMINIC Vela  [facility] [phone]   [facility]  [phone]

## 2022-07-06 ENCOUNTER — TELEPHONE (OUTPATIENT)
Dept: BEHAVIORAL HEALTH | Facility: CLINIC | Age: 27
End: 2022-07-06

## 2022-07-06 ENCOUNTER — HOSPITAL ENCOUNTER (INPATIENT)
Facility: CLINIC | Age: 27
LOS: 9 days | Discharge: HOME | End: 2022-07-15
Attending: PSYCHIATRY & NEUROLOGY | Admitting: PSYCHIATRY & NEUROLOGY
Payer: COMMERCIAL

## 2022-07-06 VITALS
RESPIRATION RATE: 20 BRPM | BODY MASS INDEX: 41.6 KG/M2 | TEMPERATURE: 98 F | HEART RATE: 88 BPM | OXYGEN SATURATION: 99 % | SYSTOLIC BLOOD PRESSURE: 142 MMHG | WEIGHT: 274.47 LBS | DIASTOLIC BLOOD PRESSURE: 92 MMHG | HEIGHT: 68 IN

## 2022-07-06 DIAGNOSIS — G47.09 OTHER INSOMNIA: ICD-10-CM

## 2022-07-06 DIAGNOSIS — E56.9 VITAMIN DEFICIENCY: ICD-10-CM

## 2022-07-06 DIAGNOSIS — F32.A ANXIETY AND DEPRESSION: ICD-10-CM

## 2022-07-06 DIAGNOSIS — Z00.00 HEALTHCARE MAINTENANCE: ICD-10-CM

## 2022-07-06 DIAGNOSIS — J30.1 SEASONAL ALLERGIC RHINITIS DUE TO POLLEN: ICD-10-CM

## 2022-07-06 DIAGNOSIS — F41.9 ANXIETY AND DEPRESSION: ICD-10-CM

## 2022-07-06 DIAGNOSIS — F33.1 MODERATE RECURRENT MAJOR DEPRESSION (H): Primary | ICD-10-CM

## 2022-07-06 DIAGNOSIS — B36.0 TINEA VERSICOLOR: ICD-10-CM

## 2022-07-06 PROBLEM — T14.91XA SUICIDE ATTEMPT (H): Status: ACTIVE | Noted: 2022-07-06

## 2022-07-06 LAB — SARS-COV-2 RNA RESP QL NAA+PROBE: NEGATIVE

## 2022-07-06 PROCEDURE — C9803 HOPD COVID-19 SPEC COLLECT: HCPCS | Performed by: EMERGENCY MEDICINE

## 2022-07-06 PROCEDURE — 250N000013 HC RX MED GY IP 250 OP 250 PS 637: Performed by: INTERNAL MEDICINE

## 2022-07-06 PROCEDURE — 250N000013 HC RX MED GY IP 250 OP 250 PS 637

## 2022-07-06 PROCEDURE — 124N000002 HC R&B MH UMMC

## 2022-07-06 PROCEDURE — 250N000013 HC RX MED GY IP 250 OP 250 PS 637: Performed by: PSYCHIATRY & NEUROLOGY

## 2022-07-06 PROCEDURE — 250N000013 HC RX MED GY IP 250 OP 250 PS 637: Performed by: HOSPITALIST

## 2022-07-06 PROCEDURE — 99239 HOSP IP/OBS DSCHRG MGMT >30: CPT | Performed by: INTERNAL MEDICINE

## 2022-07-06 PROCEDURE — 87635 SARS-COV-2 COVID-19 AMP PRB: CPT | Performed by: INTERNAL MEDICINE

## 2022-07-06 RX ORDER — FLUTICASONE PROPIONATE 50 MCG
2 SPRAY, SUSPENSION (ML) NASAL DAILY
Status: DISCONTINUED | OUTPATIENT
Start: 2022-07-07 | End: 2022-07-15 | Stop reason: HOSPADM

## 2022-07-06 RX ORDER — HYDROXYZINE HYDROCHLORIDE 25 MG/1
25 TABLET, FILM COATED ORAL EVERY 4 HOURS PRN
Status: DISCONTINUED | OUTPATIENT
Start: 2022-07-06 | End: 2022-07-15 | Stop reason: HOSPADM

## 2022-07-06 RX ORDER — MAGNESIUM HYDROXIDE/ALUMINUM HYDROXICE/SIMETHICONE 120; 1200; 1200 MG/30ML; MG/30ML; MG/30ML
30 SUSPENSION ORAL EVERY 4 HOURS PRN
Status: DISCONTINUED | OUTPATIENT
Start: 2022-07-06 | End: 2022-07-15 | Stop reason: HOSPADM

## 2022-07-06 RX ORDER — TRAZODONE HYDROCHLORIDE 50 MG/1
50 TABLET, FILM COATED ORAL
Status: DISCONTINUED | OUTPATIENT
Start: 2022-07-06 | End: 2022-07-15 | Stop reason: HOSPADM

## 2022-07-06 RX ORDER — CETIRIZINE HYDROCHLORIDE 10 MG/1
10 TABLET ORAL DAILY
Status: DISCONTINUED | OUTPATIENT
Start: 2022-07-07 | End: 2022-07-15 | Stop reason: HOSPADM

## 2022-07-06 RX ORDER — ACETAMINOPHEN 325 MG/1
650 TABLET ORAL EVERY 4 HOURS PRN
Status: DISCONTINUED | OUTPATIENT
Start: 2022-07-06 | End: 2022-07-15 | Stop reason: HOSPADM

## 2022-07-06 RX ORDER — KETOCONAZOLE 20 MG/ML
SHAMPOO TOPICAL
Status: DISCONTINUED | OUTPATIENT
Start: 2022-07-08 | End: 2022-07-15 | Stop reason: HOSPADM

## 2022-07-06 RX ORDER — OLANZAPINE 10 MG/1
10 TABLET ORAL 3 TIMES DAILY PRN
Status: DISCONTINUED | OUTPATIENT
Start: 2022-07-06 | End: 2022-07-15 | Stop reason: HOSPADM

## 2022-07-06 RX ORDER — IBUPROFEN 200 MG
400 TABLET ORAL EVERY 6 HOURS PRN
Status: DISCONTINUED | OUTPATIENT
Start: 2022-07-06 | End: 2022-07-15 | Stop reason: HOSPADM

## 2022-07-06 RX ORDER — LAMOTRIGINE 25 MG/1
25 TABLET ORAL DAILY
Status: DISCONTINUED | OUTPATIENT
Start: 2022-07-07 | End: 2022-07-11

## 2022-07-06 RX ORDER — MULTIPLE VITAMINS W/ MINERALS TAB 9MG-400MCG
1 TAB ORAL DAILY
Status: DISCONTINUED | OUTPATIENT
Start: 2022-07-07 | End: 2022-07-15 | Stop reason: HOSPADM

## 2022-07-06 RX ORDER — AMOXICILLIN 250 MG
1 CAPSULE ORAL 2 TIMES DAILY PRN
Status: DISCONTINUED | OUTPATIENT
Start: 2022-07-06 | End: 2022-07-15 | Stop reason: HOSPADM

## 2022-07-06 RX ORDER — OLANZAPINE 10 MG/2ML
10 INJECTION, POWDER, FOR SOLUTION INTRAMUSCULAR 3 TIMES DAILY PRN
Status: DISCONTINUED | OUTPATIENT
Start: 2022-07-06 | End: 2022-07-15 | Stop reason: HOSPADM

## 2022-07-06 RX ADMIN — OLANZAPINE 5 MG: 5 TABLET, ORALLY DISINTEGRATING ORAL at 12:48

## 2022-07-06 RX ADMIN — CETIRIZINE HYDROCHLORIDE 10 MG: 10 TABLET, FILM COATED ORAL at 08:20

## 2022-07-06 RX ADMIN — HYDROXYZINE HYDROCHLORIDE 25 MG: 25 TABLET ORAL at 23:43

## 2022-07-06 RX ADMIN — LORAZEPAM 2 MG: 1 TABLET ORAL at 15:32

## 2022-07-06 RX ADMIN — LORAZEPAM 2 MG: 1 TABLET ORAL at 08:21

## 2022-07-06 RX ADMIN — LAMOTRIGINE 25 MG: 25 TABLET ORAL at 07:52

## 2022-07-06 ASSESSMENT — ACTIVITIES OF DAILY LIVING (ADL)
WALKING_OR_CLIMBING_STAIRS_DIFFICULTY: NO
ADLS_ACUITY_SCORE: 28
DRESSING/BATHING_DIFFICULTY: NO
FALL_HISTORY_WITHIN_LAST_SIX_MONTHS: NO
TOILETING_ISSUES: NO
ADLS_ACUITY_SCORE: 23
ADLS_ACUITY_SCORE: 23
DIFFICULTY_EATING/SWALLOWING: NO
DIFFICULTY_COMMUNICATING: NO
ADLS_ACUITY_SCORE: 23
HYGIENE/GROOMING: INDEPENDENT
ADLS_ACUITY_SCORE: 23
ORAL_HYGIENE: INDEPENDENT
DRESS: SCRUBS (BEHAVIORAL HEALTH)
CONCENTRATING,_REMEMBERING_OR_MAKING_DECISIONS_DIFFICULTY: NO
ADLS_ACUITY_SCORE: 23
ADLS_ACUITY_SCORE: 23
LAUNDRY: UNABLE TO COMPLETE
DOING_ERRANDS_INDEPENDENTLY_DIFFICULTY: NO
WEAR_GLASSES_OR_BLIND: NO
ADLS_ACUITY_SCORE: 23
ADLS_ACUITY_SCORE: 28
ADLS_ACUITY_SCORE: 23
HEARING_DIFFICULTY_OR_DEAF: NO
CHANGE_IN_FUNCTIONAL_STATUS_SINCE_ONSET_OF_CURRENT_ILLNESS/INJURY: NO

## 2022-07-06 ASSESSMENT — LIFESTYLE VARIABLES
SKIP TO QUESTIONS 9-10: 1
AUDIT-C TOTAL SCORE: 0
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
## 2022-07-06 NOTE — PROGRESS NOTES
[facility] Atty called to speak to pt - gave pt the ED mobile phone to pt, allowed privacy for the conversation.

## 2022-07-06 NOTE — PROGRESS NOTES
[facility] psych ready for pt - report called to DARWIN Valenzuela at [phone].  Transportation being arranged - pt notified, calm at this point.  Pt stated she wanted sister Penelope notified of transfer - attempted to contact her, went straight to v.m w/ no identifying factors so no v.m left.

## 2022-07-06 NOTE — PLAN OF CARE
Goal Outcome Evaluation:        Pt requested the BR and to Brush her teeth. 1:1 visual throughout. Pt pleasant and conversing with writer. Laptop needs to be charged so pt is lying in bed, quietly, calm. Reading.

## 2022-07-06 NOTE — PROGRESS NOTES
Spoke again to DARWIN Lou charge at [facility]. They are unable to take the patient until late evening shift, possibly around 1800
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
## 2022-07-06 NOTE — PROGRESS NOTES
Patient walked to and from the bathroom without issue. Patient was accepting of the door being partially open and did not argue with nurse on why. Patient returned back to her room and is now watching movies on her laptop.

## 2022-07-06 NOTE — PROGRESS NOTES
Patient noted to become increasingly more agitated, up and down on the cart, pacing and then at the window of the room with face pushed up on the glass. Patient wanting to take a shower. Advised that there is no safe shower available here. Patient offered bedbath bag or washcloths, patient became notable frustrated and wanting to leave. Patient offered ativan and took it without issue. Currently on cart with eyes closed

## 2022-07-06 NOTE — PROGRESS NOTES
Care Transitions    CM continued communication with [facility] to confirm Civil Commitment proceedings.      - Call to [facility] Court to confirm District Court Hold.     Spoke with [facility] Administration who confirmed-Yes -  Patient was placed on 72 hours [address] Hold as of Wed-07/05/22 1423    Copy of District Court Hold sent to Risk Management for review and HIM.    -CM spoke with Claudia Avitia working the Pt's Civil Commitment. Informed of the Pt's acceptance to the [facility] Inpatient  unit. Anticipating the pt to be admitted to Station [facility].     Ban requested CM to notify her when a Station, Contact # and Fax # have been confirmed so the Pt's Court documents can be amended today. CM will connect with [facility] to confirm prior to Pt's transfer.     CM also provided the update Cline forms completed by the MD.     Ban reported the Pt's Civil Commitment Exam and Hearing is scheduled for Friday-7/8/22 at 10:30am via Zoom.   Courts will fax documents once completed by end of day.      Contact at [facility]:    Ban Avitia  [facility] Attorney's Office  Adult Services Division  [phone] phone  [phone] fax  [email or web address]      DOMINIC Vela  [facility] [phone]

## 2022-07-06 NOTE — PROGRESS NOTES
Patient advised that there is a wait on going to [place] at this time, patient is agreeable to having to wait and verbalized that she understands at this time.

## 2022-07-06 NOTE — PLAN OF CARE
"Patient woke up around 1945, has been calm and cooperative. Seen by Dr. Mccoy. Security stopped in to show patient the flowers/gifts from her mother which she seemed to appreciate.    2030 - Sitting on bed, eating supper and video chatting w/ a friend/family member on personal laptop. Per Dr. Mccoy, pt having laptop in room is not likely to be an issue, but to follow protocol.      2115 - Pt on phone w/mother, conversation somewhat argumentative, but pt is remaining calm. Pt lying in bed, crying after phone call. Asked pt if she was feeling anxious/upset and if she needed anything to help calm her down.  Pt declined meds. Stated, \"I just want to cry.\" Reassurance given. Pt calm and back on laptop within 1-2 minutes.    No major events/concerns to report. Patient has been calm and cooperative.  "

## 2022-07-06 NOTE — TELEPHONE ENCOUNTER
0525 Bed Search Update:    [facility] beds available  [facility]-No beds available  [facility]-No beds available  [facility]-No beds available  [facility]-No beds available  [facility]-No beds available  [facility]-No beds available  [facility]-No beds available  [facility]-Posting 1 bed.  [address] Per [facility] [place]-No beds available  [facility]-No beds available.  Low acuity only.  Mixed unit adol/adult/paresh  [facility]-No beds available  [facility]-No beds available  [facility]-No beds available  [facility]-No beds available  [facility]-No beds available  [facility]-Per [place] at 0446, 1 low acuity F only.  [facility]-No beds available.  Must be on a 72 HH. No intake after 10 PM.  [facility] [place]-No beds available  [facility]-Voluntary/[place] only. No hx violence or sexual assault.  [facility] [place]-No beds available  [facility] [place]-No beds available  [facility]-No recent hx violence/aggression. Must be able to program in groups.  [facility]-Low acuity only.  [facility]-No beds available.  No recent violence or aggression.  [facility] [place]-No beds available  [facility] [place]-No beds available  [facility]-No answer at 0432    Remains on wait list.

## 2022-07-06 NOTE — PROGRESS NOTES
Attempted to call report, according to Dasha they are unable to take report or the patient right now, patient is next in the Que with one more ahead of her and they are discharging another still. Dasha requested that I call back and they maybe able to take it in an hour
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
## 2022-07-06 NOTE — TELEPHONE ENCOUNTER
R: Pt currently at [facility] awaiting IP  bed transfer.    0908 Intake paged Dr. Landon to review for station 30. Intake awaiting call back.     1003 Dr. Landon called to review patient. Dr. MENDOZA requesting doc to doc information. Intake informed Dr. Landon they will get the information and page over to him.    1007 Intake called [facility] to get Doc to Doc. Information. Dr. PEREZ Mccoy Pager: [phone]. Intake paged Dr. Landon doc to doc information and awaiting call back.     1020 Dr Landon accepts pt for station 30. Intake placed patient in queue for transfer.     1028 Intake called [facility] to inform of patient transfer.    1030 Intake called [facility] ICU to inform of patient transfer.     1044 Court hold paperwork is in [employer] in the Hold/Commitment folder 10:27am. Paperwork faxed to station 30.

## 2022-07-07 LAB
ALBUMIN SERPL-MCNC: 3 G/DL (ref 3.4–5)
ALP SERPL-CCNC: 101 U/L (ref 40–150)
ALT SERPL W P-5'-P-CCNC: 54 U/L (ref 0–50)
ANION GAP SERPL CALCULATED.3IONS-SCNC: 4 MMOL/L (ref 3–14)
AST SERPL W P-5'-P-CCNC: 32 U/L (ref 0–45)
BASOPHILS # BLD AUTO: 0 10E3/UL (ref 0–0.2)
BASOPHILS NFR BLD AUTO: 0 %
BILIRUB SERPL-MCNC: 0.3 MG/DL (ref 0.2–1.3)
BUN SERPL-MCNC: 12 MG/DL (ref 7–30)
CALCIUM SERPL-MCNC: 9.2 MG/DL (ref 8.5–10.1)
CHLORIDE BLD-SCNC: 107 MMOL/L (ref 94–109)
CHOLEST SERPL-MCNC: 194 MG/DL
CO2 SERPL-SCNC: 28 MMOL/L (ref 20–32)
CREAT SERPL-MCNC: 0.82 MG/DL (ref 0.52–1.04)
EOSINOPHIL # BLD AUTO: 0.2 10E3/UL (ref 0–0.7)
EOSINOPHIL NFR BLD AUTO: 2 %
ERYTHROCYTE [DISTWIDTH] IN BLOOD BY AUTOMATED COUNT: 12.5 % (ref 10–15)
GFR SERPL CREATININE-BSD FRML MDRD: >90 ML/MIN/1.73M2
GLUCOSE BLD-MCNC: 125 MG/DL (ref 70–99)
HBA1C MFR BLD: 5.9 % (ref 0–5.6)
HCT VFR BLD AUTO: 41.7 % (ref 35–47)
HDLC SERPL-MCNC: 55 MG/DL
HGB BLD-MCNC: 14 G/DL (ref 11.7–15.7)
IMM GRANULOCYTES # BLD: 0.1 10E3/UL
IMM GRANULOCYTES NFR BLD: 1 %
LDLC SERPL CALC-MCNC: 113 MG/DL
LYMPHOCYTES # BLD AUTO: 2.5 10E3/UL (ref 0.8–5.3)
LYMPHOCYTES NFR BLD AUTO: 28 %
MCH RBC QN AUTO: 30.2 PG (ref 26.5–33)
MCHC RBC AUTO-ENTMCNC: 33.6 G/DL (ref 31.5–36.5)
MCV RBC AUTO: 90 FL (ref 78–100)
MONOCYTES # BLD AUTO: 0.6 10E3/UL (ref 0–1.3)
MONOCYTES NFR BLD AUTO: 6 %
NEUTROPHILS # BLD AUTO: 5.9 10E3/UL (ref 1.6–8.3)
NEUTROPHILS NFR BLD AUTO: 63 %
NONHDLC SERPL-MCNC: 139 MG/DL
NRBC # BLD AUTO: 0 10E3/UL
NRBC BLD AUTO-RTO: 0 /100
PLATELET # BLD AUTO: 243 10E3/UL (ref 150–450)
POTASSIUM BLD-SCNC: 4.2 MMOL/L (ref 3.4–5.3)
PROT SERPL-MCNC: 6.5 G/DL (ref 6.8–8.8)
RBC # BLD AUTO: 4.63 10E6/UL (ref 3.8–5.2)
SODIUM SERPL-SCNC: 139 MMOL/L (ref 133–144)
TRIGL SERPL-MCNC: 132 MG/DL
WBC # BLD AUTO: 9.2 10E3/UL (ref 4–11)

## 2022-07-07 PROCEDURE — H2032 ACTIVITY THERAPY, PER 15 MIN: HCPCS

## 2022-07-07 PROCEDURE — 83036 HEMOGLOBIN GLYCOSYLATED A1C: CPT | Performed by: PSYCHIATRY & NEUROLOGY

## 2022-07-07 PROCEDURE — 99222 1ST HOSP IP/OBS MODERATE 55: CPT | Mod: AI | Performed by: PSYCHIATRY & NEUROLOGY

## 2022-07-07 PROCEDURE — 85004 AUTOMATED DIFF WBC COUNT: CPT | Performed by: PSYCHIATRY & NEUROLOGY

## 2022-07-07 PROCEDURE — 250N000013 HC RX MED GY IP 250 OP 250 PS 637: Performed by: PSYCHIATRY & NEUROLOGY

## 2022-07-07 PROCEDURE — 82040 ASSAY OF SERUM ALBUMIN: CPT | Performed by: PSYCHIATRY & NEUROLOGY

## 2022-07-07 PROCEDURE — 124N000002 HC R&B MH UMMC

## 2022-07-07 PROCEDURE — 36415 COLL VENOUS BLD VENIPUNCTURE: CPT | Performed by: PSYCHIATRY & NEUROLOGY

## 2022-07-07 PROCEDURE — 80061 LIPID PANEL: CPT | Performed by: PSYCHIATRY & NEUROLOGY

## 2022-07-07 PROCEDURE — 80053 COMPREHEN METABOLIC PANEL: CPT | Performed by: PSYCHIATRY & NEUROLOGY

## 2022-07-07 RX ORDER — BUPROPION HYDROCHLORIDE 150 MG/1
150 TABLET ORAL DAILY
Status: DISCONTINUED | OUTPATIENT
Start: 2022-07-07 | End: 2022-07-15 | Stop reason: HOSPADM

## 2022-07-07 RX ADMIN — BUPROPION HYDROCHLORIDE 150 MG: 150 TABLET, EXTENDED RELEASE ORAL at 15:58

## 2022-07-07 RX ADMIN — OLANZAPINE 10 MG: 10 TABLET, FILM COATED ORAL at 21:08

## 2022-07-07 RX ADMIN — HYDROXYZINE HYDROCHLORIDE 25 MG: 25 TABLET ORAL at 08:18

## 2022-07-07 RX ADMIN — HYDROXYZINE HYDROCHLORIDE 25 MG: 25 TABLET ORAL at 21:08

## 2022-07-07 RX ADMIN — LAMOTRIGINE 25 MG: 25 TABLET ORAL at 08:18

## 2022-07-07 RX ADMIN — CETIRIZINE HYDROCHLORIDE 10 MG: 10 TABLET, FILM COATED ORAL at 08:18

## 2022-07-07 RX ADMIN — FLUTICASONE PROPIONATE 2 SPRAY: 50 SPRAY, METERED NASAL at 08:20

## 2022-07-07 RX ADMIN — MULTIPLE VITAMINS W/ MINERALS TAB 1 TABLET: TAB at 08:18

## 2022-07-07 RX ADMIN — TRAZODONE HYDROCHLORIDE 50 MG: 50 TABLET ORAL at 22:47

## 2022-07-07 ASSESSMENT — ACTIVITIES OF DAILY LIVING (ADL)
ADLS_ACUITY_SCORE: 28

## 2022-07-07 NOTE — PHARMACY-ADMISSION MEDICATION HISTORY
Admission Medication History Completed by Pharmacy    See [facility] Admission Navigator for allergy information, preferred outpatient pharmacy, prior to admission medications and immunization status.     Medication History Sources:     Patient    Pharmacy fill history via M/A-COM    Changes made to PTA medication list (reason):    Added: None    Deleted: None    Changed: None    Additional Information:    Pt reported taking cetirizine and Flonase prior to hospitalization. She reported she is prescribed bupropion and lamotrigine but hasn't taken these in months. When asked she also confirmed using acetaminophen and ibuprofen on an as needed basis and using ketoconzaole shampoo and a multivitamin.     Prior to Admission medications    Medication Sig Last Dose Taking? Auth Provider Long Term End Date   acetaminophen (TYLENOL) 500 MG tablet Take 1,000 mg by mouth every 6 hours as needed for mild pain Unknown at Unknown time Yes Reported, Patient     cetirizine (ZYRTEC) 10 MG tablet Take 1 tablet (10 mg) by mouth daily Unknown at Unknown time Yes Caterina Selby MD     fluticasone (FLONASE) 50 MCG/ACT nasal spray Spray 2 sprays into both nostrils daily Unknown at Unknown time Yes Orlando Bethea MD     ibuprofen (ADVIL/MOTRIN) 400 MG tablet Take 1 tablet (400 mg) by mouth every 6 hours as needed for moderate pain Unknown at Unknown time Yes Octaviano Beck MD     ketoconazole (NIZORAL) 2 % external shampoo Apply to affected area 3 times per week Unknown at Unknown time Yes Anne Marie Thomas MD     Multiple Vitamins-Minerals (MULTIVITAMIN ADULTS PO) Take 1 tablet by mouth daily Unknown at Unknown time Yes Reported, Patient     buPROPion (WELLBUTRIN XL) 150 MG 24 hr tablet Take 2 tablets (300 mg) by mouth every morning  Patient not taking: Reported on 7/7/2022 Not Taking at Unknown time  Orlando Bethea MD Yes    lamoTRIgine (LAMICTAL) 25 MG tablet Take 50 mg by mouth daily  Patient not taking: Reported on 7/7/2022  Not Taking at Unknown time  Reported, Patient Yes        Date completed: 07/07/22    Medication history completed by: Jennifer Goldstein [facility]

## 2022-07-07 NOTE — PLAN OF CARE
07/06/22 2033   Patient Belongings   Did you bring any home meds/supplements to the hospital?  Yes   Disposition of meds  Sent to security/pharmacy per site process   Patient Belongings locker   Patient Belongings Put in Hospital Secure Location (Security or Locker, etc.) bracelet;clothing;laptop computer;shoes   Belongings Search Yes   Clothing Search Yes   Second Staff [place] [address]     Patient Belongings Kept in Locker #6:  - [place]  - [place]  - Flowers  - Balloon Attached to Bag  - Scrunchie  - Sparkly [employer]  - [employer]  - Computer w/   - 's License  - Bracelet  - Black Shirt  - Leggings  - Jelly Beans  - Lotion  - Coloring Pages  - Bath Bomb    Patient Belongings on Person:  - Black Hair Binder  - Mini Bible    Patient Belongings Sent to Security:  Envelope #[phone]  ..A               Admission:  I am responsible for any personal items that are not sent to the safe or pharmacy.  [facility] is not responsible for loss, theft or damage of any property in my possession.    Signature:  _________________________________ Date: _______  Time: _____                                              Staff Signature:  ____________________________ Date: ________  Time: _____      2nd Staff person, if patient is unable/unwilling to sign:    Signature: ________________________________ Date: ________  Time: _____     Discharge:  [facility] has returned all of my personal belongings:    Signature: _________________________________ Date: ________  Time: _____                                          Staff Signature:  ____________________________ Date: ________  Time: _____

## 2022-07-07 NOTE — PLAN OF CARE
Assessment/Intervention/Current Symtoms and Care Coordination:  CTC returned call from Ban Richard, [phone]. Advised pt is currently admitted to station 30. She has advised the court, has requested an amended hold with current location.      Court hold and order received.   [facility]  Exam and preliminary hearin/8 at 10:30   Final hearin/13    Pt : Cholo Rodriguez at [phone]    Copy to pt, copy to chart.      Discharge Plan or Goal:  Pending stabilization & development of a safe discharge plan.  Considerations include: pending       Barriers to Discharge:  Patient requires further psychiatric stabilization due to current symptomology and Medication management with possible adjustments       Referral Status:  Pending; petition has been made     Legal Status:  Court hold   [facility]: [place]  File Number: [id number]     Contacts:  Penelope Mancini, Sister,  [phone]     Upcoming Meetings/Important Dates:  Court hold received, waiting for court dates

## 2022-07-07 NOTE — PLAN OF CARE
Problem: Sleep Disturbance (Depressive Signs/Symptoms)  Goal: Improved Sleep (Depressive Signs/Symptoms)  Outcome: Ongoing, Progressing     The patient was in bed at the beginning of the shift and appeared to be sleeping. Pt was up at 2340, complaining of anxiety which she rated at 8/10. No complaints of pain were noted or observed, and no medical concerns during this shift. Will continue to monitor.    PRN: hydroxyzine 25mg
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
## 2022-07-07 NOTE — PLAN OF CARE
INITIAL OCCUPATIONAL THERAPY NOTE     07/07/22 1433   Group Therapy Session   Group Attendance attended group session   Time Session Began 1300   Time Session Ended 1345   Total Time patient participated (minutes) 15 - no charge   Total # Attendees 3   Group Type task skill;other (see comments)  (OT)   Group Topic Covered cognitive activities;leisure exploration/use of leisure time;structured socialization;coping skills/lifestyle management   Group Session Detail OT Clinic   Patient Response/Contribution able to recall/repeat info presented;cooperative with task;discussed personal experience with topic;expressed understanding of topic   Patient Response Detail Pt participated in occupational therapy clinic to facilitate coping skill exploration, creative expression within personally meaningful activities, and clinical observation of social, cognitive, and kinesthetic performance skills.   Pt response: Pt came to group the last 15 minutes. Presented with flat affect and slightly delayed with responses initially. She was able to make choice to just begin a simple structured coloring task yet did note interest with specific type of materials to work with. She engaged in the project for the 15 minutes and did briefly socialize with staff and the other patient about some leisure interests.      Goal Outcome Evaluation: Will continue to encourage attendance and assess
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
## 2022-07-07 NOTE — PROGRESS NOTES
SPIRITUAL HEALTH SERVICES  SPIRITUAL ASSESSMENT Progress Note  [facility] Station 30      REFERRAL SOURCE: I did try to visit patient Monica twice per [facility] hospital  referral. However, on my both visit attempts pt was not available. Pt was asleep on my both visit attempts. I also informed to the unit staff about my visit attempts. I have been informed is new and it is better to let the pt sleep but if the pt is interested to get a  support, the unit staff will let this  know.     PLAN: I will remain open to provide spiritual care for the pt as needed.    Chloe Weaver M.Div. (Alem), M.Th., D.Min., [facility]  Staff   Pager [phone]

## 2022-07-07 NOTE — PLAN OF CARE
"Pt has been visible on the unit this morning but has been withdrawn/isolative to herself. Pt was quite anxious and presented as confused when she first woke up this morning. Writer asked if she was okay and she stated \"just trying to take it all in\". Pt was offered PRN hydroxyzine as she appeared quite anxious and she took it with some relief. Pt is now denying SI/SIB. Pt states \"i'm just bored\". Appetite and fluid intake adequate. VSS. Pt denies pain. No medication side effects reported or observed. Pt is perseverative on having her own weighted blanket, however blanket is not appropriate for the unit due to large aureliano style. Pt was offered unit weighted blanket but declined. Pt seems to be processing pretty slowly and is slow to respond to questions. Pt seems confused about court hold paperwork which was delivered by  today.  Pt was encouraged to talk this over with CTC.    "
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
## 2022-07-08 PROCEDURE — 250N000011 HC RX IP 250 OP 636: Performed by: PSYCHIATRY & NEUROLOGY

## 2022-07-08 PROCEDURE — 250N000013 HC RX MED GY IP 250 OP 250 PS 637: Performed by: PSYCHIATRY & NEUROLOGY

## 2022-07-08 PROCEDURE — 90853 GROUP PSYCHOTHERAPY: CPT

## 2022-07-08 PROCEDURE — 99232 SBSQ HOSP IP/OBS MODERATE 35: CPT | Performed by: PSYCHIATRY & NEUROLOGY

## 2022-07-08 PROCEDURE — 124N000002 HC R&B MH UMMC

## 2022-07-08 RX ORDER — DIPHENHYDRAMINE HCL 50 MG
50 CAPSULE ORAL EVERY 8 HOURS PRN
Status: DISCONTINUED | OUTPATIENT
Start: 2022-07-08 | End: 2022-07-15 | Stop reason: HOSPADM

## 2022-07-08 RX ORDER — HALOPERIDOL 5 MG/ML
5 INJECTION INTRAMUSCULAR EVERY 8 HOURS PRN
Status: DISCONTINUED | OUTPATIENT
Start: 2022-07-08 | End: 2022-07-15 | Stop reason: HOSPADM

## 2022-07-08 RX ORDER — LORAZEPAM 2 MG/ML
INJECTION INTRAMUSCULAR
Status: DISCONTINUED
Start: 2022-07-08 | End: 2022-07-09 | Stop reason: HOSPADM

## 2022-07-08 RX ORDER — LORAZEPAM 2 MG/ML
2 INJECTION INTRAMUSCULAR EVERY 8 HOURS PRN
Status: DISCONTINUED | OUTPATIENT
Start: 2022-07-08 | End: 2022-07-15 | Stop reason: HOSPADM

## 2022-07-08 RX ORDER — DIPHENHYDRAMINE HYDROCHLORIDE 50 MG/ML
50 INJECTION INTRAMUSCULAR; INTRAVENOUS EVERY 8 HOURS PRN
Status: DISCONTINUED | OUTPATIENT
Start: 2022-07-08 | End: 2022-07-15 | Stop reason: HOSPADM

## 2022-07-08 RX ORDER — HALOPERIDOL 5 MG/1
5 TABLET ORAL EVERY 8 HOURS PRN
Status: DISCONTINUED | OUTPATIENT
Start: 2022-07-08 | End: 2022-07-15 | Stop reason: HOSPADM

## 2022-07-08 RX ORDER — HALOPERIDOL 5 MG/ML
INJECTION INTRAMUSCULAR
Status: DISCONTINUED
Start: 2022-07-08 | End: 2022-07-09 | Stop reason: HOSPADM

## 2022-07-08 RX ORDER — DIPHENHYDRAMINE HYDROCHLORIDE 50 MG/ML
INJECTION INTRAMUSCULAR; INTRAVENOUS
Status: DISCONTINUED
Start: 2022-07-08 | End: 2022-07-09 | Stop reason: HOSPADM

## 2022-07-08 RX ORDER — LORAZEPAM 1 MG/1
2 TABLET ORAL EVERY 8 HOURS PRN
Status: DISCONTINUED | OUTPATIENT
Start: 2022-07-08 | End: 2022-07-15 | Stop reason: HOSPADM

## 2022-07-08 RX ADMIN — FLUTICASONE PROPIONATE 2 SPRAY: 50 SPRAY, METERED NASAL at 08:39

## 2022-07-08 RX ADMIN — CETIRIZINE HYDROCHLORIDE 10 MG: 10 TABLET, FILM COATED ORAL at 08:35

## 2022-07-08 RX ADMIN — LORAZEPAM 2 MG: 2 INJECTION INTRAMUSCULAR at 19:43

## 2022-07-08 RX ADMIN — BUPROPION HYDROCHLORIDE 150 MG: 150 TABLET, EXTENDED RELEASE ORAL at 08:35

## 2022-07-08 RX ADMIN — MULTIPLE VITAMINS W/ MINERALS TAB 1 TABLET: TAB at 08:35

## 2022-07-08 RX ADMIN — OLANZAPINE 10 MG: 10 TABLET, FILM COATED ORAL at 18:40

## 2022-07-08 RX ADMIN — HALOPERIDOL LACTATE 5 MG: 5 INJECTION, SOLUTION INTRAMUSCULAR at 19:43

## 2022-07-08 RX ADMIN — LAMOTRIGINE 25 MG: 25 TABLET ORAL at 08:35

## 2022-07-08 RX ADMIN — DIPHENHYDRAMINE HYDROCHLORIDE 50 MG: 50 INJECTION, SOLUTION INTRAMUSCULAR; INTRAVENOUS at 19:43

## 2022-07-08 RX ADMIN — TRAZODONE HYDROCHLORIDE 50 MG: 50 TABLET ORAL at 22:28

## 2022-07-08 ASSESSMENT — ACTIVITIES OF DAILY LIVING (ADL)
ADLS_ACUITY_SCORE: 28
DRESS: INDEPENDENT
DRESS: SCRUBS (BEHAVIORAL HEALTH)
LAUNDRY: WITH SUPERVISION
ADLS_ACUITY_SCORE: 28
LAUNDRY: WITH SUPERVISION
ADLS_ACUITY_SCORE: 28
ORAL_HYGIENE: INDEPENDENT
ADLS_ACUITY_SCORE: 28
HYGIENE/GROOMING: INDEPENDENT
ADLS_ACUITY_SCORE: 28
ADLS_ACUITY_SCORE: 28
HYGIENE/GROOMING: INDEPENDENT
ORAL_HYGIENE: INDEPENDENT
ADLS_ACUITY_SCORE: 28

## 2022-07-08 NOTE — PLAN OF CARE
Goal Outcome Evaluation:     Pt appeared to be a sleep @ all safety checks.  Pt slept 7 hours.

## 2022-07-08 NOTE — PLAN OF CARE
Pt observed in the lounge area entire shift alert and oriented x4. Pt presents with full range affect tearful mood. Pt tearful this shift when discussing situations that led to the overdose. Pt denied SI/HI/AVH/HI as well as anxiety and depression. Pt observed socializing with peers this shift and attended unit groups. Pt was medication compliant this shift and denied any side effects. Pt reported appetite and sleep as adequate. Staff will continue to monitor and support with the current plan of care.

## 2022-07-08 NOTE — PLAN OF CARE
Assessment/Intervention/Current Symtoms and Care Coordination:  Court link not sent; CTC called Judit in court administration, was given passcode and password for court. Pt was able to attend exam and preliminary hearing.      Discharge Plan or Goal:  Pending stabilization & development of a safe discharge plan.  Considerations include: Pt was living in a sober house but can't remember the name and unsure if she can return. Consider IRTS.       Barriers to Discharge:  Patient requires further psychiatric stabilization due to current symptomology and Medication management with possible adjustments       Referral Status:  Petition for commitment     Legal Status:  Court hold   County: [place]  File Number: [id number]    Pt : Cholo Rodriguez at [phone]    Contacts:  Pt : Cholo Rodriguez at [phone]  Penelope Mancini, Sister, [phone]. No UZMA on file      Upcoming Meetings/Important Dates:  Final court hearing 7/13

## 2022-07-08 NOTE — PROGRESS NOTES
" 07/07/22 1900   Group Therapy Session   Time Session Began 1710   Time Session Ended 1806   Total Time patient participated (minutes) 33   Total # Attendees 4   Group Type expressive therapy   Group Topic Covered relaxation techniques   Group Session Detail Relaxation   Patient Response/Contribution cooperative with task   Patient Response Detail Cooperatively engaged in Evening Music Relaxation group to decrease anxiety and promote self-care. Entered group late.  Irritable with peer in group. Did calm and regulate.  Stated \"I am blessed to be here.\"        "

## 2022-07-08 NOTE — PROGRESS NOTES
"[facility] [place] [facility]   Psychiatric Progress Note        Interim History:     The patient's care was discussed with the treatment team during the daily team meeting and/or staff's chart notes were reviewed.  Staff report patient spent evening and morning in her room. She was compliant with meds. Reported feeling sad about her Suicide attempt and need to stay at this facility. Had preliminary hearing today am.    Met with patient: she was seen in her room. She appeared to be irritable and sad, but stated that she felt much better and more stable and would like to leave hospital ASAP: \"I don't belong here\". I reminded Katharine that she came here after severe Suicide attempt, needed intubation, but she was not receptive. Stated that she came here because she made a mistake, but now she was all better. She was equally non receptive when I told her that she was in a court process and was unlikely to leave at least until next week. I informed Katharine that I would not come to work on Saturday and Sunday and would see her on Monday. Katharine told me that she didn't want to see me on Monday and would like to be discharged today, then, turned away and walked out of room.         Medications:       buPROPion  150 mg Oral Daily     cetirizine  10 mg Oral Daily     fluticasone  2 spray Both Nostrils Daily     ketoconazole   Topical Once per day on Mon Wed Fri     lamoTRIgine  25 mg Oral Daily     multivitamin w/minerals  1 tablet Oral Daily          Allergies:     Allergies   Allergen Reactions     Latex      Seasonal Allergies Itching          Labs:   No results found for this or any previous visit (from the past 24 hour(s)).       Psychiatric Examination:     /84   Pulse 90   Temp 98  F (36.7  C) (Temporal)   Resp 17   Ht 1.702 m (5' 7\")   Wt 125.6 kg (276 lb 12.8 oz)   SpO2 99%   BMI 43.35 kg/m    Weight is 276 lbs 12.8 oz  Body mass index is 43.35 kg/m .    Orthostatic Vitals  Report      Most " Recent      Sitting Orthostatic /88 07/08 0848    Sitting Orthostatic Pulse (bpm) 122 07/08 0848    Standing Orthostatic /81 07/08 0848    Standing Orthostatic Pulse (bpm) 105 07/08 0848        Appearance: dressed in hospital scrubs and moderately obese  Attitude:  uncooperative  Eye Contact:  poor   Mood:  angry and depressed  Affect:  restricted range  Speech:  decreased prosody  Psychomotor Behavior:  no evidence of tardive dyskinesia, dystonia, or tics and physical agitation  Throught Process:  linear  Associations:  no loose associations  Thought Content:  denies both active and passive suicidal thoughts, unclear if she has them  Insight:  limited  Judgement:  poor  Oriented to:  time, person, and place  Attention Span and Concentration:  limited  Recent and Remote Memory:  limited    Clinical Global Impressions  First: 7/4 7/8/2022      Most recent:            Precautions:     Behavioral Orders   Procedures     Code 1 - Restrict to Unit     Elopement precautions     Routine Programming     As clinically indicated     Seizure precautions     Self Injury Precaution     Status 15     Every 15 minutes.     Suicide precautions     Patients on Suicide Precautions should have a Combination Diet ordered that includes a Diet selection(s) AND a Behavioral Tray selection for Safe Tray - with utensils, or Safe Tray - NO utensils            DIagnoses:     1.  Historical diagnosis of bipolar affective disorder, depressed, severe, without psychotic features.  2.  Status post suicide.    3.  Polysubstance overdose.  Unclear if patient still meets criteria for cannabis use disorder and cough medication abuse.  4.  Cluster B traits versus borderline personality disorder highly likely.  5.  Historical diagnosis of posttraumatic stress disorder.         Plan:     No medication changes today. Had 1st court hearing today, Will continue to provide support and structure.

## 2022-07-08 NOTE — PLAN OF CARE
"Problem: Behavioral Health Plan of Care  Goal: Optimized Coping Skills in Response to Life Stressors  Outcome: Ongoing, Not Progressing    Katharine is irritable, aggressive, tearful, threatening. She denies HI/SI/AVH. Endorses depression and anxiety and yells, \"being in this loony bin is making me depressed! I don't need to fucking be here!\" She refuses to make eye contact. She doesn't appear to have insight into her illness because she states, \"I just need to be let go and everything would be fine!\" Reminded her that she had a very serious suicide attempt and that will need to be addressed. Pt starts crying and ran to her room.    Pt then had a behavioral outburst, screaming profanities in her room, banging on the wall. She initially refused Zyprexa, then eventually agreed. Pt states, \"If I don't punch the wall then I'm going to punch someone's face!\" We requested that she does neither. Offered distraction, recommended she reach out to support system, but pt declined and wanted to be left alone, which staff agreed to.    Later, pt wanted access to her laptop (which is down in security). Staff told pt they did not have access to her laptop. Pt told a staff member, \"Shut your white ass mouth! I WILL PUT MY HANDS ON YOU I SWEAR TO GOD!\" She was posturing. W contacted on-call MD and got order for B-52. Pt was directed back to her room. She was repeatedly slamming her door loudly and screaming \"I don't fucking care! Send me to prison! I DON'T FUCKING CARE!\" It sounded like she was breaking the door. Code 21 was called. She was offered the PRN Benadryl/Ativan/Haldol or going to seclusion. Pt accepted IM meds and agreed to be safe in her room and stop being destructive to hospital property. Pt states, \"having white men in my room is huge trigger! I'm not okay with it!\" Staff agreed to stay out of her room if she maintains behavioral control, which she agreed to.    Pt was able to be appropriate the rest of the shift. She ate " some snacks, kept to herself. Overall, she has significant difficulty maintaining behavioral control and has low frustration tolerance, and quickly escalates to aggressive, assaultive, threatening behaviors.

## 2022-07-08 NOTE — PLAN OF CARE
"Patient is alert and oriented. Mood is anxious, depressed. Affect is flat and blunted Patient attended OT group this evening. She spent majority of the time [place] [facility] [place]. She received multiple calls and also made a few phone calls this evening. She received a visitor this evening and the visit went well. Patient reported high anxiety this evening. She also stated that she is upset about her situation but did not elaborate on that. She stated \"I want to cry but I can't. I am really scared about tomorrow (has court tomorrow) writer empathized with patient and told her to take one day at a time. She has been going through her paperwork in the dining room. She requested for prn hydroxyzine and olanzapine which were given to her with good relief. She denies SI/SIB/hallucinations. Denies pain. She ate most of dinner. Staff will continue to monitor.   "

## 2022-07-08 NOTE — PLAN OF CARE
07/08/22 1218   Individualization/Patient Specific Goals   Patient Personal Strengths resilient   Patient Vulnerabilities adverse childhood experience(s);substance abuse/addiction;traumatic event;housing insecurity;occupational insecurity   Anxieties, Fears or Concerns Pt has concerns about returning to her sober house and not losing her placement   Individualized Care Needs continue monitoring for safety   Patient-Specific Goals (Include Timeframe) Petition for commitment   Interprofessional Rounds   Summary Pt newly admitted, post overdose, petition for commitment has been made   Participants psychiatrist;nursing;CTC   Team Discussion   Participants Dr. Landon, Melissa Sapp MS,LP; Sherman, RN   Progress pt newly admitted, petition has been made   Anticipated length of stay 2 weeks   Continued Stay Criteria/Rationale petition for commitment has been made. Pt had significant overdose with intent to harm self   Medical/Physical post-overdose seizure   Plan pt to stabilize, work with team for safety   Rationale for change in precautions or plan no changes   Safety Plan per unit protocol   Anticipated Discharge Disposition IRTS   PRECAUTIONS AND SAFETY    Behavioral Orders   Procedures    Code 1 - Restrict to Unit    Elopement precautions    Routine Programming     As clinically indicated    Seizure precautions    Self Injury Precaution    Status 15     Every 15 minutes.    Suicide precautions     Patients on Suicide Precautions should have a Combination Diet ordered that includes a Diet selection(s) AND a Behavioral Tray selection for Safe Tray - with utensils, or Safe Tray - NO utensils         Safety  Safety WDL: WDL  Suicidality: Status 15, Behavioral scrubs (pajamas), Minimal furniture in room  Seizure precautions: clutter free environment, other (see comment) (pads on the floor)  Elopement Assessment: Distress about legal situation (holds for mental health or criminal)  Elopement Interventions: status 15,  no shoes, room away from unit doors, behavioral scrubs (pajamas), signs posted on unit entrance / exit doors

## 2022-07-09 PROCEDURE — 124N000002 HC R&B MH UMMC

## 2022-07-09 PROCEDURE — 250N000013 HC RX MED GY IP 250 OP 250 PS 637: Performed by: PSYCHIATRY & NEUROLOGY

## 2022-07-09 PROCEDURE — H2032 ACTIVITY THERAPY, PER 15 MIN: HCPCS

## 2022-07-09 RX ADMIN — FLUTICASONE PROPIONATE 2 SPRAY: 50 SPRAY, METERED NASAL at 10:48

## 2022-07-09 RX ADMIN — CETIRIZINE HYDROCHLORIDE 10 MG: 10 TABLET, FILM COATED ORAL at 10:47

## 2022-07-09 RX ADMIN — HYDROXYZINE HYDROCHLORIDE 25 MG: 25 TABLET ORAL at 18:52

## 2022-07-09 RX ADMIN — OLANZAPINE 10 MG: 10 TABLET, FILM COATED ORAL at 16:20

## 2022-07-09 RX ADMIN — MULTIPLE VITAMINS W/ MINERALS TAB 1 TABLET: TAB at 10:48

## 2022-07-09 RX ADMIN — LAMOTRIGINE 25 MG: 25 TABLET ORAL at 10:47

## 2022-07-09 RX ADMIN — BUPROPION HYDROCHLORIDE 150 MG: 150 TABLET, EXTENDED RELEASE ORAL at 10:47

## 2022-07-09 RX ADMIN — TRAZODONE HYDROCHLORIDE 50 MG: 50 TABLET ORAL at 20:45

## 2022-07-09 ASSESSMENT — ACTIVITIES OF DAILY LIVING (ADL)
ORAL_HYGIENE: INDEPENDENT
ADLS_ACUITY_SCORE: 28
HYGIENE/GROOMING: INDEPENDENT
ADLS_ACUITY_SCORE: 28
LAUNDRY: WITH SUPERVISION
ADLS_ACUITY_SCORE: 28
DRESS: STREET CLOTHES
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
## 2022-07-09 NOTE — PLAN OF CARE
Problem: Behavioral Health Plan of Care  Goal: Plan of Care Review  Outcome: Ongoing, Progressing   Goal Outcome Evaluation:      Patient appeared to have slept for 7 hours. No complaint by patient and no psychiatric issues noted. Safety checks done. Will continue to monitor patient.
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
## 2022-07-09 NOTE — PLAN OF CARE
"Problem: Behavioral Health Plan of Care  Goal: Optimized Coping Skills in Response to Life Stressors  Outcome: Ongoing, Progressing    At beginning of shift, Katharine reports feeling agitated. She accepted PRN Zyprexa. Later, she reports it was inneffective, states, \"I'm just annoyed and angry, can't I have more Zyprexa?\" She accepted PRN Hydroxyzine, which pt reports was not helpful. However, per Writer's observations, she actually was calmer the rest of the shift after receiving both PRN's, they seemed to be effective.    She has a flat affect. Appears intermittently irritable. She is withdrawn, keeps to herself. Denies HI/SI/AVH/depression/anxiety. Describes her mood as \"irritated.\" Eye contact is poor. She was heard yelling on the phone once, but seemed to be able to calm herself down.    Appetite remains good.  She was better at maintaining behavioral control this evening, as she did not have any physical outbursts. She got PRN Trazodone and went to bed.       "

## 2022-07-10 PROCEDURE — 250N000013 HC RX MED GY IP 250 OP 250 PS 637: Performed by: PSYCHIATRY & NEUROLOGY

## 2022-07-10 PROCEDURE — 124N000002 HC R&B MH UMMC

## 2022-07-10 PROCEDURE — G0177 OPPS/PHP; TRAIN & EDUC SERV: HCPCS

## 2022-07-10 RX ADMIN — LAMOTRIGINE 25 MG: 25 TABLET ORAL at 09:27

## 2022-07-10 RX ADMIN — FLUTICASONE PROPIONATE 2 SPRAY: 50 SPRAY, METERED NASAL at 09:27

## 2022-07-10 RX ADMIN — CETIRIZINE HYDROCHLORIDE 10 MG: 10 TABLET, FILM COATED ORAL at 09:26

## 2022-07-10 RX ADMIN — TRAZODONE HYDROCHLORIDE 50 MG: 50 TABLET ORAL at 20:49

## 2022-07-10 RX ADMIN — IBUPROFEN 400 MG: 200 TABLET, FILM COATED ORAL at 17:19

## 2022-07-10 RX ADMIN — BUPROPION HYDROCHLORIDE 150 MG: 150 TABLET, EXTENDED RELEASE ORAL at 09:26

## 2022-07-10 RX ADMIN — OLANZAPINE 10 MG: 10 TABLET, FILM COATED ORAL at 17:20

## 2022-07-10 RX ADMIN — MULTIPLE VITAMINS W/ MINERALS TAB 1 TABLET: TAB at 09:26

## 2022-07-10 ASSESSMENT — ACTIVITIES OF DAILY LIVING (ADL)
ADLS_ACUITY_SCORE: 28

## 2022-07-10 NOTE — PROGRESS NOTES
"   07/09/22 1900   Group Therapy Session   Group Attendance attended group session   Time Session Began 1700   Time Session Ended 1745   Total Time patient participated (minutes) 45   Total # Attendees 5   Group Type expressive therapy   Group Topic Covered emotions/expression   Patient Response/Contribution cooperative with task     Art Therapy directive was to create two drawings expressing both a current issue (anger, anxiety, depression ect.), and a solution image (calm, happiness ect.), using lines, shapes and colors.  Goals of directive: emotional expression, emotional regulation  Pt was an active participant, focused on task for the full time that she attended group. Pt finished artwork and briefly shared with group. Pt created imagery to symbolize her anger and said she often feels \"boxed in\" when angry and said she is aware when she is in this state that she is hurtful to herself and others. Pt created wavy, softer lines for second image which she translated to a sense of calm. Pt talked about how being around water and sunshine help pt to remain calm.  Pt's mood in group was calm, pleasant participant.          "

## 2022-07-10 NOTE — PROGRESS NOTES
SPIRITUAL HEALTH SERVICES Progress Note  [facility] ([facility]) 30 NB    Spoke with pt's nurse regarding consult order. Since request is not an emergency, nurse advised to have the unit  visit the patient tomorrow.     Unit  to follow up with patient tomorrow.    Della Bethea, [facility]   Intern  Pager [phone]      * [facility] remains available 24/7 for emergent requests/referrals, either by having the switchboard page the on-call  or by entering an ASAP/STAT consult in Epic (this will also page the on-call ). Routine Epic consults receive an initial response within 24 hours.*

## 2022-07-10 NOTE — PLAN OF CARE
Problem: Sleep Disturbance (Depressive Signs/Symptoms)  Goal: Improved Sleep (Depressive Signs/Symptoms)  Outcome: Ongoing, Progressing     The patient was in bed at the beginning of the shift breathing quietly and unlabored. Pt slept 7 hours. No complaints of pain were noted or observed, and no medical concerns during this shift. Will continue to monitor patient closely.

## 2022-07-10 NOTE — PLAN OF CARE
"Problem: Decreased Participation/Engagement (Depressive Signs/Symptoms)  Goal: Increased Participation and Engagement (Depressive Signs/Symptoms)  Outcome: Ongoing, Progressing    Katharine has a flat affect. She states her mood as \"not good right now, I just need Zyprexa.\" She denies HI/SI/AVH/depression/anxiety. She has gotten better at self-regulating when she is frustrated/annoyed, rather than yelling and cursing at staff. She still lacks some insight into her hospital stay, stating that her suicide attempt \"wasn't that bad.\" Spent the shift napping, going to group, talking on the phone, watching TV in the milieu, keeps to herself,    Pt received PRN Zyprexa for agitation and Ibuprofen for headache, which were both helpful. Pt states her mood is \"a bit better\" after attending group and receiving Zyprexa.     Appetite is good. She received PRN Trazodone and went to bed.       "
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
## 2022-07-11 PROCEDURE — H2032 ACTIVITY THERAPY, PER 15 MIN: HCPCS

## 2022-07-11 PROCEDURE — 124N000002 HC R&B MH UMMC

## 2022-07-11 PROCEDURE — 99232 SBSQ HOSP IP/OBS MODERATE 35: CPT | Performed by: PSYCHIATRY & NEUROLOGY

## 2022-07-11 PROCEDURE — 250N000013 HC RX MED GY IP 250 OP 250 PS 637: Performed by: PSYCHIATRY & NEUROLOGY

## 2022-07-11 PROCEDURE — G0177 OPPS/PHP; TRAIN & EDUC SERV: HCPCS

## 2022-07-11 RX ORDER — LAMOTRIGINE 25 MG/1
50 TABLET ORAL DAILY
Status: DISCONTINUED | OUTPATIENT
Start: 2022-07-12 | End: 2022-07-15 | Stop reason: HOSPADM

## 2022-07-11 RX ADMIN — TRAZODONE HYDROCHLORIDE 50 MG: 50 TABLET ORAL at 20:24

## 2022-07-11 RX ADMIN — MULTIPLE VITAMINS W/ MINERALS TAB 1 TABLET: TAB at 08:24

## 2022-07-11 RX ADMIN — BUPROPION HYDROCHLORIDE 150 MG: 150 TABLET, EXTENDED RELEASE ORAL at 08:24

## 2022-07-11 RX ADMIN — CETIRIZINE HYDROCHLORIDE 10 MG: 10 TABLET, FILM COATED ORAL at 08:24

## 2022-07-11 RX ADMIN — OLANZAPINE 10 MG: 10 TABLET, FILM COATED ORAL at 08:24

## 2022-07-11 RX ADMIN — LAMOTRIGINE 25 MG: 25 TABLET ORAL at 08:24

## 2022-07-11 RX ADMIN — FLUTICASONE PROPIONATE 2 SPRAY: 50 SPRAY, METERED NASAL at 08:23

## 2022-07-11 ASSESSMENT — ACTIVITIES OF DAILY LIVING (ADL)
ADLS_ACUITY_SCORE: 28
LAUNDRY: WITH SUPERVISION
ADLS_ACUITY_SCORE: 28
ADLS_ACUITY_SCORE: 28
DRESS: INDEPENDENT;SCRUBS (BEHAVIORAL HEALTH)
ADLS_ACUITY_SCORE: 28
ORAL_HYGIENE: INDEPENDENT
ADLS_ACUITY_SCORE: 28
DRESS: SCRUBS (BEHAVIORAL HEALTH)
HYGIENE/GROOMING: INDEPENDENT
LAUNDRY: WITH SUPERVISION
ADLS_ACUITY_SCORE: 28
ADLS_ACUITY_SCORE: 28
ORAL_HYGIENE: INDEPENDENT
HYGIENE/GROOMING: INDEPENDENT

## 2022-07-11 NOTE — PLAN OF CARE
Occupational Therapy Group Note       07/10/22 1900   Group Therapy Session   Group Attendance attended group session   Time Session Began 1700   Time Session Ended 1745   Total Time patient participated (minutes) 45   Total # Attendees 6-8   Group Type psychoeducation   Group Topic Covered structured socialization   Group Session Detail OT Group: [facility] or [facility] for opportunity to socialize, improve self-confidence, improve insight/personal-awareness, as well as improving cardiovascular and musculoskeletal health.   Patient Response/Contribution cooperative with task   Patient Response Detail Patient consistently participated in the offered activity and respectfully listened to peers' responses to questions. She expressed playing [employer] as a preferred healthy leisure activity she played growing up. Her affect was somewhat blunted, although she appeared content and was pleasant.

## 2022-07-11 NOTE — PLAN OF CARE
07/10/22 1608   Group Therapy Session   Group Attendance attended group session   Time Session Began 1115   Time Session Ended 1205   Total Time patient participated (minutes) 50   Total # Attendees 2   Group Type psychotherapeutic   Group Topic Covered self-care activities;relaxation techniques   Patient Response/Contribution cooperative with task   Patient Response Detail Monica attended an OT relaxation yoga group this a.m. Participated in all suggested movements and the guided relaxation exercise. Quiet and agreeable. Expressed appreciation for the session.

## 2022-07-11 NOTE — PLAN OF CARE
Problem: Sleep Disturbance (Depressive Signs/Symptoms)  Goal: Improved Sleep (Depressive Signs/Symptoms)  Outcome: Ongoing, Progressing    The patient appeared asleep at the beginning of the shift. There was no pain or medical concerns repoted or observed. Pt slept 7 hours during this shift. Will continue to monitor.

## 2022-07-11 NOTE — PLAN OF CARE
"  Problem: Suicidal Behavior  Goal: Suicidal Behavior is Absent or Managed  Outcome: Ongoing, Progressing   Goal Outcome Evaluation:    Plan of Care Reviewed With: patient      \"I'm way better, way more clear minded.\" Focus has improved. Denies depression, Has anxiety \"I get triggered by a patient here, he's a male and he keeps walking by me.\" Requested Zyprexa this morning for anxiety which patient received with some relief. Patient is also using healthy coping skills. Denies suicidal thoughts. Attends groups, \"tries to be social.\" I know I have to keep taking my meds. I feel better so I go off my meds but I know that doesn't work and I have to keep taking them. Attended some groups today.           "

## 2022-07-11 NOTE — PLAN OF CARE
Assessment/Intervention/Current Symtoms and Care Coordination:  Court order continuing hold received; court 7/13 at 2:15. Copy to pt, copy to chart.    CTC met with pt to discuss discharge, pt's providers. Pt reports can return to her sober home, has been in contact with the director. Pt reports hopes to return there as she does not want to lose her housing which is her concern if she was to go to a treatment program.     Pt also reports feels better, inquiring about length of stay. CTC explained court process, encouraged her to talk to her  regarding legal process.     Per pt request, [facility] called Stacey at [facility], [phone]. Stacey states that pt can return but is aware that another incident will result in loss of housing as then she likely needs something more structured.     [facility] scheduled psychiatry. Requested that pt schedule her own therapy appointment     Discharge Plan or Goal:  Pending stabilization & development of a safe discharge plan.  Considerations include: back to [facility] with providers and services       Barriers to Discharge:  Patient requires further psychiatric stabilization due to current symptomology and Medication management with possible adjustments       Referral Status:  Petition for commitment has been made     Legal Status:  Court hold   [facility]: [place]  File Number: [id number]    Pt : Cholo Rodriguez at [phone]    Contacts:  Pt : Cholo Rodriguez at [phone]  [facility]: [phone]; director: Stacey    Therapist: Sara Nguyen at [facility] [address], [place] [address]  Phone : [phone]   Email : [email or web address]    Psychiatrist:  Yazmin Mars NP  [facility]  [address] [place] [address]  [phone] Fax: [phone]     Upcoming Meetings/Important Dates:  Court on court 7/13 at 2:15

## 2022-07-11 NOTE — PROGRESS NOTES
"[facility], [place]   Psychiatric Progress Note        Interim History:     The patient's care was discussed with the treatment team during the daily team meeting and/or staff's chart notes were reviewed.  Staff report patient had overall, a good weekend. She was reported to sleep for about 7 hours, was less labile, compliant with meds and her care.    Met with patient: she was seen in milieu. She reported being in a good mood and apologized for being agitated during her last visit with me. Stated that she talked to her sober house and was told that she could return there. Hoped that she could be put on a Stay off commitment and didn't have to stay at this hospital until her full commitment day. I promised to talk to staff and get back to her. She had no further questions or concerns.          Medications:       buPROPion  150 mg Oral Daily     cetirizine  10 mg Oral Daily     fluticasone  2 spray Both Nostrils Daily     ketoconazole   Topical Once per day on Mon Wed Fri     lamoTRIgine  25 mg Oral Daily     multivitamin w/minerals  1 tablet Oral Daily          Allergies:     Allergies   Allergen Reactions     Latex      Seasonal Allergies Itching          Labs:   No results found for this or any previous visit (from the past 24 hour(s)).       Psychiatric Examination:     /86   Pulse 91   Temp 97.6  F (36.4  C) (Oral)   Resp 16   Ht 1.702 m (5' 7\")   Wt 124.6 kg (274 lb 12.8 oz)   SpO2 98%   BMI 43.04 kg/m    Weight is 274 lbs 12.8 oz  Body mass index is 43.04 kg/m .    Orthostatic Vitals  Report      Most Recent      Sitting Orthostatic /86 07/10 0920    Sitting Orthostatic Pulse (bpm) 109 07/10 0920    Standing Orthostatic /82 07/11 0805    Standing Orthostatic Pulse (bpm) 107 07/11 0805         Appearance: dressed in hospital scrubs and moderately obese  Attitude:  More cooperative  Eye Contact: better  Mood: less depressed  Affect:  restricted range  Speech: "  decreased prosody  Psychomotor Behavior:  no evidence of tardive dyskinesia, dystonia, or tics and physical agitation  Throught Process:  linear  Associations:  no loose associations  Thought Content:  denies both active and passive suicidal thoughts, unclear if she has them  Insight:  limited, but improving   Judgement:  poor, but improving   Oriented to:  time, person, and place  Attention Span and Concentration: better  Recent and Remote Memory: fair    Clinical Global Impressions  First: 7/4 7/8/2022      Most recent:            Precautions:     Behavioral Orders   Procedures     Assault precautions     Code 1 - Restrict to Unit     Elopement precautions     Routine Programming     As clinically indicated     Seizure precautions     Self Injury Precaution     Status 15     Every 15 minutes.     Suicide precautions     Patients on Suicide Precautions should have a Combination Diet ordered that includes a Diet selection(s) AND a Behavioral Tray selection for Safe Tray - with utensils, or Safe Tray - NO utensils            DIagnoses:     1.  Historical diagnosis of bipolar affective disorder, depressed, severe, without psychotic features.  2.  Status post suicide.    3.  Polysubstance overdose.  Unclear if patient still meets criteria for cannabis use disorder and cough medication abuse.  4.  Cluster B traits versus borderline personality disorder highly likely.  5.  Historical diagnosis of posttraumatic stress disorder.         Plan:     Will increase Lamictal dose, no other med changes. Will talk to the county about putting patient on the Stay off commitment as she appears to be more agreeable with care recommendations. Will continue to provide support and structure. Expect discharge within next few days.

## 2022-07-11 NOTE — PROGRESS NOTES
SPIRITUAL HEALTH SERVICES  SPIRITUAL ASSESSMENT Progress Note  [facility] ([facility]) Station 30     REFERRAL SOURCE: I did try to visit patient Monica per triaged follow up referral. In all my visit attempts pt was so busy with staff and group activity. However, I shared my visit attempts for the unit staff and if the pt is interested for the  visit that I am willing to come back again.    PLAN: I will remain open to provide spiritual care for the pt as needed.    Chloe Weaver M.Div ([place])., M.Th., D.Min., [facility]  Staff   Pager [phone]

## 2022-07-11 NOTE — PLAN OF CARE
07/11/22 1344   Group Therapy Session   Group Attendance attended group session   Time Session Began 1015   Time Session Ended 1115   Total Time patient participated (minutes) 60   Total # Attendees 5   Group Type task skill;other (see comments)  (OT)   Group Topic Covered leisure exploration/use of leisure time;relaxation techniques;coping skills/lifestyle management   Group Session Detail OT Clinic   Patient Response/Contribution able to recall/repeat info presented;cooperative with task;discussed personal experience with topic;expressed understanding of topic;organized   Patient Response Detail Pt actively participated in occupational therapy clinic to facilitate coping skill exploration, creative expression within personally meaningful activities, and clinical observation of social, cognitive, and kinesthetic performance skills.   Pt response: Pt was focused on her creative coloring task for entire session. She did note the benefits of the activity, especially when group went longer due to code on unit. She did note that she does tend to be obsessive compulsive at times when she began to organize the large set of markers. She did note ways at home that being organized was helpful and not as a challenge for her. She was open to discuss discharge plans and how to manage her health more effectively - stay on medications, keep seeing her therapist and doctor, use supports and work to add a few more variety of coping skills and activities for herself. See also flowsheet.     Goal Outcome Evaluation: Progressing and ongoing

## 2022-07-11 NOTE — DISCHARGE INSTRUCTIONS
Behavioral Discharge Planning and Instructions    Summary: You were admitted on 7/6/2022  due to Suicide Attempt.  You were treated by Dr. Landon and discharged on 7/15/22 from station 30 to  [facility] at [address] [place] [address]    A petition for commitment was made when you were admitted to the hospital. You agreed to a stayed commitment to the [facility] on 7/13/22 which expires on 1/12/23.       Main Diagnosis:   Bipolar disorder  PTSD   Substance abuse    Health Care Follow-up:   You will be assigned a county . Until that person is assigned, [facility] will work with you as your temporary :   [facility]   Phone: [phone]  Fax: [phone]     Psychiatry Appointment Date/Time: Wednesday 8/10 at 2:30 pm; this is telehealth   Psychiatrist: Yazmin Mars NP     [facility]  [address] [place] [address]  [phone] Fax: [phone]    Therapy Appointment Date/Time: You have sent a message to your therapist for an appointment   Therapist: Sara Borjas     Address: [facility] [address], [place] [address]  Phone : [phone]   Email : [email or web address]       Attend all scheduled appointments with your outpatient providers. Call at least 24 hours in advance if you need to reschedule an appointment to ensure continued access to your outpatient providers.     Major Treatments, Procedures and Findings:  You were provided with: a psychiatric assessment, assessed for medical stability, medication evaluation and/or management, group therapy, and milieu management    Symptoms to Report: feeling more aggressive, increased confusion, losing more sleep, mood getting worse, or thoughts of suicide    Early warning signs can include: increased depression or anxiety sleep disturbances increased thoughts or  "behaviors of suicide or self-harm  increased unusual thinking, such as paranoia or hearing voices    Safety and Wellness:  Take all medicines as directed.  Make no changes unless your doctor suggests them.      Follow treatment recommendations.  Refrain from alcohol and non-prescribed drugs.  If there is a concern for safety, call 911.    Resources:   [facility] crisis: [phone]    Crisis Intervention: 456.878.7567 or 505-648-5769 (TTY: 436.971.2189).  Call anytime for help.  National Seth on Mental Illness (www.mn.angelita.org): 478.715.3605 or 553-901-2242.  Alcoholics Anonymous (www.alcoholics-anonymous.org): Check your phone book for your local chapter.  Suicide Awareness Voices of Education (SAVE) (www.save.org): 522-156-SPHX (9283)  National Suicide Prevention Line (www.mentalhealthmn.org): 886-409-MLXS (8753)  Mental Health Consumer/Survivor Network of [place] (www.mhcsn.net): 973.466.8369 or 805-945-1944  Mental Health Association of [place] (www.mentalhealth.org): 795.817.7612 or 112-248-2891  Self- Management and Recovery Training., [employer] [place]-- Toll free: 702.172.5095  www.[employer].MymCart  Text 4 Life: txt \"LIFE\" to 11994 for immediate support and crisis intervention  Crisis text line: Text \"[place]\" to 479097. Free, confidential, 24/7.    General Medication Instructions:   See your medication sheet(s) for instructions.   Take all medicines as directed.  Make no changes unless your doctor suggests them.   Go to all your doctor visits.  Be sure to have all your required lab tests. This way, your medicines can be refilled on time.  Do not use any drugs not prescribed by your doctor.  Avoid alcohol.    Advance Directives:   Scanned document on file with [place]? No scanned doc  Is document scanned? Pt states no documents  Honoring Choices Your Rights Handout: Informed and given  Was more information offered? Materials given    The Treatment team has appreciated the opportunity to work with you. If you have any " questions or concerns about your recent admission, you can contact the unit which can receive your call 24 hours a day, 7 days a week. They will be able to get in touch with a Provider if needed. The unit number is [phone] .

## 2022-07-12 PROCEDURE — G0177 OPPS/PHP; TRAIN & EDUC SERV: HCPCS

## 2022-07-12 PROCEDURE — 99232 SBSQ HOSP IP/OBS MODERATE 35: CPT | Performed by: PSYCHIATRY & NEUROLOGY

## 2022-07-12 PROCEDURE — 124N000002 HC R&B MH UMMC

## 2022-07-12 PROCEDURE — 250N000013 HC RX MED GY IP 250 OP 250 PS 637: Performed by: PSYCHIATRY & NEUROLOGY

## 2022-07-12 RX ADMIN — BUPROPION HYDROCHLORIDE 150 MG: 150 TABLET, EXTENDED RELEASE ORAL at 08:36

## 2022-07-12 RX ADMIN — HYDROXYZINE HYDROCHLORIDE 25 MG: 25 TABLET ORAL at 20:16

## 2022-07-12 RX ADMIN — LAMOTRIGINE 50 MG: 25 TABLET ORAL at 08:36

## 2022-07-12 RX ADMIN — MULTIPLE VITAMINS W/ MINERALS TAB 1 TABLET: TAB at 08:36

## 2022-07-12 RX ADMIN — FLUTICASONE PROPIONATE 2 SPRAY: 50 SPRAY, METERED NASAL at 08:53

## 2022-07-12 RX ADMIN — TRAZODONE HYDROCHLORIDE 50 MG: 50 TABLET ORAL at 20:16

## 2022-07-12 RX ADMIN — CETIRIZINE HYDROCHLORIDE 10 MG: 10 TABLET, FILM COATED ORAL at 08:36

## 2022-07-12 ASSESSMENT — ACTIVITIES OF DAILY LIVING (ADL)
ADLS_ACUITY_SCORE: 28
HYGIENE/GROOMING: INDEPENDENT
LAUNDRY: WITH SUPERVISION
ADLS_ACUITY_SCORE: 28
ADLS_ACUITY_SCORE: 28
ORAL_HYGIENE: INDEPENDENT
DRESS: INDEPENDENT
ADLS_ACUITY_SCORE: 28
DRESS: INDEPENDENT
ADLS_ACUITY_SCORE: 28
HYGIENE/GROOMING: INDEPENDENT
ORAL_HYGIENE: INDEPENDENT
ADLS_ACUITY_SCORE: 28
LAUNDRY: WITH SUPERVISION
ADLS_ACUITY_SCORE: 28

## 2022-07-12 NOTE — PLAN OF CARE
Occupational Therapy Group Note:     07/12/22 1500   Group Therapy Session   Group Attendance attended group session   Time Session Began 1400   Time Session Ended 1445   Total Time patient participated (minutes) 45   Total # Attendees 4-6   Group Type life skill   Group Topic Covered balanced lifestyle;coping skills/lifestyle management;relaxation techniques   Group Session Detail Lm Chi   Patient Response/Contribution cooperative with task;expressed understanding of topic   Patient Response Detail Pt actively participated in a structured occupational therapy group with a focus on coping through movement via Lm Chi as a strategy to facilitate therapeutic exercise, calming, and stress management. Pt actively followed 100% of the movements, and remained attentive and engaged throughout group. Pt verbalized enjoying the calming movements at the end of group, and spoke about how it would be helpful to incorporate the movements into her morning routine.

## 2022-07-12 NOTE — PROGRESS NOTES
07/11/22 2100   Group Therapy Session   Group Attendance attended group session   Time Session Began 1700   Time Session Ended 1750   Total Time patient participated (minutes) 50   Total # Attendees 4   Group Type recreation   Group Topic Covered leisure exploration/use of leisure time   Group Session Detail TR leisure group   Patient Response/Contribution cooperative with task   Patient Response Detail Pt participated in Therapeutic Recreation group with focus on leisure participation, communication skills, and critical thinking. Engaged and focused in the group recreational activity via a group game.  Pt was a full participant throughout the entire duration of group.  Appropriately shared sense of humor with peers during group and appeared to brighten with social interaction.

## 2022-07-12 NOTE — PROGRESS NOTES
"[facility], [place]   Psychiatric Progress Note        Interim History:     The patient's care was discussed with the treatment team during the daily team meeting and/or staff's chart notes were reviewed.  Staff report patient appeared to be calmer and yesterday and today am didn't request prn meds for anxiety. She was reported to sleep for about 7 hours, was less labile, compliant with meds and care.    Met with patient: she was seen in milieu. She reported being in a good mood. Asked again about Stay off Commitment and whether she would have to have a court. I explained that some counties would still have court even patient agrees with conditions of Stay off commitment and some counties still prefer to have court hearing. I informed Kayla that being on a Stay off Commitment would involve getting CM who would monitor her compliance with meds and abstinence for street drugs and alcohol and Monica said that she was fully aware of that. Said that her overdose was because mother had triggered her and that she would try to avoid communicating with mother. She denied Suicidal ideation, Homicidal thoughts, Auditory hallucinations, Visual hallucinations. She had no further questions or concerns. She denied having any side effects after yesterday increase in Lamictal.         Medications:       buPROPion  150 mg Oral Daily     cetirizine  10 mg Oral Daily     fluticasone  2 spray Both Nostrils Daily     ketoconazole   Topical Once per day on Mon Wed Fri     lamoTRIgine  50 mg Oral Daily     multivitamin w/minerals  1 tablet Oral Daily          Allergies:     Allergies   Allergen Reactions     Latex      Seasonal Allergies Itching          Labs:   No results found for this or any previous visit (from the past 24 hour(s)).       Psychiatric Examination:     /82   Pulse 95   Temp 98  F (36.7  C) (Temporal)   Resp 16   Ht 1.702 m (5' 7\")   Wt 126 kg (277 lb 12.8 oz)   SpO2 98%   BMI " 43.51 kg/m    Weight is 277 lbs 12.8 oz  Body mass index is 43.51 kg/m .    Orthostatic Vitals  Report      Most Recent      Sitting Orthostatic /84 07/12 0852    Sitting Orthostatic Pulse (bpm) 97 07/12 0852    Standing Orthostatic /80 07/12 0852    Standing Orthostatic Pulse (bpm) 116 07/12 0852         Appearance: dressed in hospital scrubs and moderately obese  Attitude:  More cooperative  Eye Contact: better  Mood: less depressed  Affect:  restricted range  Speech:  decreased prosody  Psychomotor Behavior:  no evidence of tardive dyskinesia, dystonia, or tics and physical agitation  Throught Process:  linear  Associations:  no loose associations  Thought Content:  denies both active and passive suicidal thoughts   Insight: improving   Judgement:  poor, but improving   Oriented to:  time, person, and place  Attention Span and Concentration: better  Recent and Remote Memory: fair    Clinical Global Impressions  First: 7/4 7/8/2022      Most recent:            Precautions:     Behavioral Orders   Procedures     Assault precautions     Code 1 - Restrict to Unit     Elopement precautions     Routine Programming     As clinically indicated     Seizure precautions     Self Injury Precaution     Status 15     Every 15 minutes.     Suicide precautions     Patients on Suicide Precautions should have a Combination Diet ordered that includes a Diet selection(s) AND a Behavioral Tray selection for Safe Tray - with utensils, or Safe Tray - NO utensils            DIagnoses:     1.  Historical diagnosis of bipolar affective disorder, depressed, severe, without psychotic features.  2.  Status post suicide.    3.  Polysubstance overdose.  Unclear if patient still meets criteria for cannabis use disorder and cough medication abuse.  4.  Cluster B traits versus borderline personality disorder highly likely.  5.  Historical diagnosis of posttraumatic stress disorder.         Plan:     No med changes. CTC talked to the  county about putting Monica on a stay off commitment, See discussion above. Will continue to provide support and structure. Expect discharge within next few days. Patient will return to her sober house.

## 2022-07-12 NOTE — PLAN OF CARE
Goal Outcome Evaluation:          Problem: Sleep Disturbance (Depressive Signs/Symptoms)  Goal: Improved Sleep (Depressive Signs/Symptoms)  Outcome: Ongoing, Progressing  Intervention: Promote Healthy Sleep Hygiene  Recent Flowsheet Documentation  Taken 7/12/2022 0600 by Elina Elaine RN  Sleep Hygiene Promotion:   awakenings minimized   noise level reduced   regular sleep pattern promoted   room lighting adjusted      Patient appeared to be asleep for 7 hours during safety checks this shift. No complaints or concerns voiced by patient or noted by staff. Will continue to monitor and update if there are changes.

## 2022-07-12 NOTE — PLAN OF CARE
"Problem: Violence Risk or Actual  Goal: Anger and Impulse Control  Outcome: Ongoing, Progressing    Katharine is actually quite polite this evening. She is calm and cooperative. She denies HI/SI/AVH/depression/anxiety. She has a mostly flat affect when interacting with staff, but sometimes smiles when talking on the phone. This is the first evening that she has denied agitation and did not need Zyprexa. She reports her mood as \"fine.\" She spent the shift napping, talking on the phone, eating snacks, and watching TV in the milieu.    Appetite is good. No acute events. She received PRN Trazodone at  and went to bed. She is behaviorally in control.       "

## 2022-07-12 NOTE — PLAN OF CARE
"  Problem: Behavioral Health Plan of Care  Goal: Plan of Care Review  Recent Flowsheet Documentation  Taken 7/12/2022 0900 by Shonda Cheema RN  Plan of Care Reviewed With: patient  Patient Agreement with Plan of Care: agrees   Goal Outcome Evaluation:    Plan of Care Reviewed With: patient       Patient is observed in the milieu, social with staff and peers.  Patient has a flat and blunted affect.  Speech is clear and relevant.  Thoughts are logical and relevant.  Mood is calm.  Patient denies thoughts of SI, SIB, HI, and hallucinations. Patient denies depression and anxiety.   Patient stated, she hopes to discharge soon, so that she can feel the sun.  Patient is compliant with her meds.  Appetitie is appropriate.  Patient is behaviorally appropriate.  Patient reported a barrier to discharge is the \"Stay of Commitment.\"  Patient asked what that means.  The writer provided verbal education regarding the committemnt process.  Patient denies any further questions or concerns.  Prior to lunch, the patient is engaged in playing a chess game with peers.     Patient has paper work to complete for [facility].  Patient is observed as she completed the paper work with a pen.      Patient continues to verbalize feeling safe through out the shift.  Patient remained calm and behaviorally appropriate.  patient denies having questions or concerns.  Continue with plan of care.                 "

## 2022-07-12 NOTE — PLAN OF CARE
Occupational Therapy Group Note:     07/12/22 1200   Group Therapy Session   Group Attendance attended group session   Time Session Began 1115   Time Session Ended 1215   Total Time patient participated (minutes) 55   Total # Attendees 5   Group Type expressive therapy   Group Topic Covered coping skills/lifestyle management   Group Session Detail OT clinic   Patient Response/Contribution cooperative with task;organized   Patient Response Detail Pt actively participated in occupational therapy clinic to facilitate coping skill exploration, creative expression within personally meaningful activities, and clinical observation of social, cognitive, and kinesthetic performance skills. Pt response: Independent to initiate, gather materials, sequence, and adjust to workspace demands as needed. Demonstrated good focus, planning, and attention to detail for selected structured creative expression task. Able to ask for assistance as needed, and socialized with peers and staff. She handled a conversation well when a male peer asked her slightly intrusive questions, as she was able to answer the question briefly without over-sharing. She shared that she has remained sober since being in treatment. Expressed gratitude for this group, and shared that it has reminded her how much she enjoys art. Pleasant and engaged with a bright affect.

## 2022-07-12 NOTE — PLAN OF CARE
Assessment/Intervention/Current Symtoms and Care Coordination  -Chart review  -Pre round meeting with team  -Rounded with team, addressed patient needs/concerns  -Post round meeting with team  Current Symptoms include the following:  Polite, calm, flat affect  Good appetite, flat affect      Pt asked what a  does. I explained the hospital role vs the targeted  role in the community.    Discharge Plan or Goal  Pending stabilization & development of a safe discharge plan.  Considerations include: return to sober home    Barriers to Discharge  Patient requires further psychiatric stabilization due to current symptomology    Referral Status  Psychiatry, Therapy, IOP and sober home    Legal Status  Patient is on a court hold in [facility]      Ginette from [facility] called: @ [phone]  She reports hearing tomorrow at 2:15PM.  She wants to know if MD supports the Cline as written by the [facility] docotro, If no, he will need to write a new one.  MD was ok with the Cline note from the ER doctor. However we are offering a stay of commitment.  I called the [facility] Attorney's Office and spoke with Gerald Bower. I informed her we are are offering a stay of commitment. She will contact the  and get the paperwork started and sent here for the tp to sign. If things get in order, then there will not be a hearing tomorrow.  The Cline note is irrelevant under this scenario.          Pt  sent the stay of commitment paperwork. I gave this to the client. She completed this and I faxed it. It was received and appreciated from this  and the .  Cholo Rodriguez    [address] [place] [id number]  [phone]       Court  sent intake paperwork. I gave them to the client. Pt completed this and I snet them back to Y[facility].    [facility]   Court Intake   [phone]  fax

## 2022-07-13 PROCEDURE — 99231 SBSQ HOSP IP/OBS SF/LOW 25: CPT | Performed by: PSYCHIATRY & NEUROLOGY

## 2022-07-13 PROCEDURE — G0177 OPPS/PHP; TRAIN & EDUC SERV: HCPCS

## 2022-07-13 PROCEDURE — 90853 GROUP PSYCHOTHERAPY: CPT

## 2022-07-13 PROCEDURE — 250N000013 HC RX MED GY IP 250 OP 250 PS 637: Performed by: PSYCHIATRY & NEUROLOGY

## 2022-07-13 PROCEDURE — 124N000002 HC R&B MH UMMC

## 2022-07-13 RX ADMIN — HYDROXYZINE HYDROCHLORIDE 25 MG: 25 TABLET ORAL at 16:42

## 2022-07-13 RX ADMIN — OLANZAPINE 10 MG: 10 TABLET, FILM COATED ORAL at 16:43

## 2022-07-13 RX ADMIN — BUPROPION HYDROCHLORIDE 150 MG: 150 TABLET, EXTENDED RELEASE ORAL at 08:48

## 2022-07-13 RX ADMIN — MULTIPLE VITAMINS W/ MINERALS TAB 1 TABLET: TAB at 08:48

## 2022-07-13 RX ADMIN — CETIRIZINE HYDROCHLORIDE 10 MG: 10 TABLET, FILM COATED ORAL at 08:48

## 2022-07-13 RX ADMIN — LAMOTRIGINE 50 MG: 25 TABLET ORAL at 08:48

## 2022-07-13 RX ADMIN — TRAZODONE HYDROCHLORIDE 50 MG: 50 TABLET ORAL at 20:07

## 2022-07-13 RX ADMIN — FLUTICASONE PROPIONATE 2 SPRAY: 50 SPRAY, METERED NASAL at 08:48

## 2022-07-13 ASSESSMENT — ACTIVITIES OF DAILY LIVING (ADL)
ADLS_ACUITY_SCORE: 28

## 2022-07-13 NOTE — PLAN OF CARE
07/13/22 1428   Group Therapy Session   Group Attendance attended group session   Time Session Began 1300   Time Session Ended 1355   Total Time patient participated (minutes) 15 - no charge   Total # Attendees 4   Group Type life skill;other (see comments)  (OT)   Group Topic Covered cognitive activities;leisure exploration/use of leisure time;structured socialization   Group Session Detail OT - Topic   Patient Response/Contribution able to recall/repeat info presented;cooperative with task;expressed understanding of topic;listened actively;offered helpful suggestions to peers   Patient Response Detail Pt actively participated in a structured occupational therapy group with a focus on visuospatial problem solving and social engagement via a group game. Pt demonstrated understanding of the novel 3-step task after an initial explanation. Pt remained focused and engaged and offered assistance to others. Affect was bright and easily shared with various topics related to the game. She was called out and not able to return to group.       Goal Outcome Evaluation: Progressing and ongoing - limited contact this session

## 2022-07-13 NOTE — PLAN OF CARE
07/13/22 1410   Group Therapy Session   Group Attendance attended group session   Time Session Began 1115   Time Session Ended 1200   Total Time patient participated (minutes) 45   Total # Attendees 6   Group Type task skill;other (see comments)  (OT)   Group Topic Covered coping skills/lifestyle management;relaxation techniques;structured socialization   Group Session Detail OT Clinic   Patient Response/Contribution able to recall/repeat info presented;cooperative with task   Patient Response Detail Pt actively participated in occupational therapy clinic to facilitate coping skill exploration, creative expression within personally meaningful activities, and clinical observation of social, cognitive, and kinesthetic performance skills.   Pt response: Independent to initiate her structured coloring. Did have several coloring posters she was working on and had not completed her initial one she started. Did engage in socialization with others on occasion. Noted at end of session benefits of the engagement in this activity and how she is planning to get supplies and use this as a coping skill.     Goal Outcome Evaluation: Progressing and ongoing - practicing coping skills

## 2022-07-13 NOTE — ED NOTES
Triage & Transition Services, Extended Care     [facility]    Monica is followed related to Placement delay: pt is on the wait list. List is long at this time.. Please see initial DEC Crisis Assessment completed for complete assessment information. Medical record is reviewed. Pt had been intubated initially, has been extubated. Pt remains in the ICU. Additional notes include Pt has continued to demonstrate SI gestures. Pt went into the bathroom and tried to electrocute herself, recurring a code 21. Writer spoke briefly with pt, voice very quiet and difficult to hear. Will check back in later in the week.    There are not significant status changes. Full DEC Reassessment is not recommended at this time. Extended Care continues to be available for review of changes to initial crisis state resulting in this encounter.     Extended Care will follow and meet with patient/family/care team as able or requested.     Medina Mccall [facility], Extended Care   [phone]         Shower only

## 2022-07-13 NOTE — PLAN OF CARE
07/13/22 1506   Group Therapy Session   Group Attendance attended group session   Time Session Began 1600   Time Session Ended 1650   Total Time patient participated (minutes) 50   Total # Attendees 3   Group Type psychotherapeutic   Group Topic Covered coping skills/lifestyle management   Group Session Detail Group participated in discussing the self-care wheel. Discussed it generally as a group; the importance of balancing the different areas, what each one might look like for the individual, how they care for themselves within that wheel. Group members then did individual exercise with pen and paper containing self-care wheel writing ways to care for self within the different areas. Brief discussion afterwards about anything that struck them while doing the exercise.   Patient Response/Contribution able to recall/repeat info presented;cooperative with task;expressed understanding of topic;listened actively;offered helpful suggestions to peers   Patient Response Detail Pt participated actively, demonstrated insight and understanding

## 2022-07-13 NOTE — PLAN OF CARE
"  Problem: Behavioral Health Plan of Care  Goal: Plan of Care Review  Recent Flowsheet Documentation  Taken 7/12/2022 1700 by Shonda Cheema RN  Plan of Care Reviewed With: patient  Patient Agreement with Plan of Care: agrees  Taken 7/12/2022 0900 by Shonda Cheema RN  Plan of Care Reviewed With: patient  Patient Agreement with Plan of Care: agrees   Goal Outcome Evaluation:    Plan of Care Reviewed With: patient     Patient verbalized having a difficult shift this evening.  Patient reports spending time in her room crying, prior to asking for a PRN.  Patient reported she is having thoughts and triggers regarding her mom.  \"I just want my mom to love me,\" patient said.  Patient is given the space to talk about her feelings.  Patient reports having a difficult childhood and feeling, \"I was always the parent.\"  Patient reports living in poverty, her father was in care home for 10 years, and her mother had a substance abuse problem.  Patient reports she had a substance abuse problem and her mother constantly asks her if she is sober.  After the conversation, the patient verbalized appreciation to allow her to talk about her feelings.  Patient reports her mood has improved.  She verbalizes depression and anxiety.  She said that it is improving. Patient denies having suicidal or self injury thoughts.  Patient has remained behaviorally controlled.  Continue with plan of care.                 "

## 2022-07-13 NOTE — PROGRESS NOTES
"[facility], [place]   Psychiatric Progress Note        Interim History:     The patient's care was discussed with the treatment team during the daily team meeting and/or staff's chart notes were reviewed.  Staff report patient appeared to be calmer and yesterday and today am didn't request prn meds for anxiety. She was again reported to sleep for about 7 hours, was compliant with meds and care. Went to some groups.     Met with patient: she was seen in milieu. She reported being in a good mood. Said that she cried yesterday after talking to someone about her mom, but said it was not her mom and that she would try not to talk to mom because talking to mother triggers her. She denied Suicidal ideation, Homicidal thoughts, Visual hallucinations, Auditory hallucinations and med side effects. We discussed that her court today would, likely, be cancelled and she was put on a stay off commitment and would be able to return to her sober house. We also discussed that as she was less anxious and labile we could stop blocking her room and she would get a roommate. Monica said that she didn't mind getting a roommate.         Medications:       buPROPion  150 mg Oral Daily     cetirizine  10 mg Oral Daily     fluticasone  2 spray Both Nostrils Daily     ketoconazole   Topical Once per day on Mon Wed Fri     lamoTRIgine  50 mg Oral Daily     multivitamin w/minerals  1 tablet Oral Daily          Allergies:     Allergies   Allergen Reactions     Latex      Seasonal Allergies Itching          Labs:   No results found for this or any previous visit (from the past 24 hour(s)).       Psychiatric Examination:     /84   Pulse 101   Temp 97  F (36.1  C) (Temporal)   Resp 16   Ht 1.702 m (5' 7\")   Wt 126 kg (277 lb 12.8 oz)   SpO2 98%   BMI 43.51 kg/m    Weight is 277 lbs 12.8 oz  Body mass index is 43.51 kg/m .    Orthostatic Vitals  Report      Most Recent      Sitting Orthostatic /86 07/13 " 0903    Sitting Orthostatic Pulse (bpm) 92 07/13 0903    Standing Orthostatic /68 07/13 0903    Standing Orthostatic Pulse (bpm) 93 07/13 0903         Appearance: dressed in hospital scrubs and moderately obese  Attitude:  More cooperative  Eye Contact: better  Mood: less depressed  Affect: still restricted range  Speech:  decreased prosody  Psychomotor Behavior:  no evidence of tardive dyskinesia, dystonia, or tics and physical agitation  Throught Process:  linear  Associations:  no loose associations  Thought Content:  denies both active and passive suicidal thoughts   Insight: improving   Judgement: improving   Oriented to:  time, person, and place  Attention Span and Concentration: better  Recent and Remote Memory: fair    Clinical Global Impressions  First: 7/4 7/8/2022      Most recent:            Precautions:     Behavioral Orders   Procedures     Assault precautions     Code 1 - Restrict to Unit     Elopement precautions     Routine Programming     As clinically indicated     Seizure precautions     Self Injury Precaution     Status 15     Every 15 minutes.     Suicide precautions     Patients on Suicide Precautions should have a Combination Diet ordered that includes a Diet selection(s) AND a Behavioral Tray selection for Safe Tray - with utensils, or Safe Tray - NO utensils            DIagnoses:     1.  Historical diagnosis of bipolar affective disorder, depressed, severe, without psychotic features.  2.  Status post suicide.    3.  Polysubstance overdose.  Unclear if patient still meets criteria for cannabis use disorder and cough medication abuse.  4.  Cluster B traits versus borderline personality disorder highly likely.  5.  Historical diagnosis of posttraumatic stress disorder.         Plan:     No med changes. [facility] talked to the county about putting Monica on a stay off commitment, this should be decided today. See discussion above. Will continue to provide support and structure. Expect  discharge within next few days. Patient will return to her sober house. Will stop blocking her room.

## 2022-07-13 NOTE — PLAN OF CARE
07/12/22 0348   Group Therapy Session   Group Attendance attended group session   Time Session Began 1700   Time Session Ended 1800   Total Time patient participated (minutes) 55   Total # Attendees 5   Group Type recreation   Group Topic Covered structured socialization;leisure exploration/use of leisure time   Group Session Detail leisure group   Patient Response/Contribution cooperative with task;listened actively;organized   Patient Response Detail Pt reported doing well during check-in, shared hopes for discharge, missing being outside/nature.  Participated in leisure group, played [employer].      Demonstrated ability to think quickly and come up with creative answers to score points.  Laughing/joking appropriately with peers.  Enjoyed elaborating, and sharing memories that were elicited by some answers.    Focused entire session, spent time after group socializing with writer about her travels, and future travel desires.

## 2022-07-13 NOTE — PLAN OF CARE
"  Problem: Mood Impairment (Depressive Signs/Symptoms)  Goal: Improved Mood Symptoms (Depressive Signs/Symptoms)  Outcome: Ongoing, Progressing     Goal Outcome Evaluation:    \"Katharine\" is agitated and Irritated this evening. Stated \"Dr. MENDOZA said I could video talk with my counselor this evening. I'm so sick of incompetent people.\" Katharine was informed that this order wasn't in the chart. This writer paged Dr. MENDOZA. Was informed by him that he would put in an order that Katharine can use the computer to pay bills per staff discretion. Katharine then went and made a phone call. She requested medications for agitation. She ate dinner and snack. She participated in evening programing. Monica continues on elopement, SIB, Suicide Seizure and Assault precautions.                      "

## 2022-07-13 NOTE — PLAN OF CARE
Assessment/Intervention/Current Symtoms and Care Coordination:  CTC completed DA, sent to [employer] with the court.      Discharge Plan or Goal:  Pending stabilization & development of a safe discharge plan.  Considerations include: back to sober house with providers and services        Barriers to Discharge:  Patient requires further psychiatric stabilization due to current symptomology and Medication management with possible adjustments        Referral Status:  Petition for commitment has been made     Legal Status:  Court hold   County: [place]  File Number: [id number]    Pt : Cholo Rodriguez at [phone]       Contacts:  Pt : Cholo Rodriguez at [phone]  [facility]: [phone]; director: Stacey     Therapist: Sara Nguyen at [facility] [address], [place] [id number]  Phone : [phone]   Email : [email or web address]     Psychiatrist:  Yazmin Mars NP  [facility]  [address] [place] [address]  [phone] Fax: [phone]     Upcoming Meetings/Important Dates:  Court on court 7/13 at 2:15

## 2022-07-13 NOTE — PLAN OF CARE
Problem: Sleep Disturbance (Depressive Signs/Symptoms)  Goal: Improved Sleep (Depressive Signs/Symptoms)  Outcome: Ongoing, Progressing   Goal Outcome Evaluation:      Pt appeared a sleep for 7 hours during the 15 minute safety checks. Pt on elopement/SIB/suicide/seizure/assault precautions, no behaviors noted.

## 2022-07-13 NOTE — PLAN OF CARE
Goal Outcome Evaluation:    Pt is pleasant and cooperative.  Pt denies any mental health concerns.  Pt has a bright affect.  Pt is med compliant and social on unit.  Pt attended all groups.

## 2022-07-14 ENCOUNTER — DOCUMENTATION ONLY (OUTPATIENT)
Dept: BEHAVIORAL HEALTH | Facility: CLINIC | Age: 27
End: 2022-07-14

## 2022-07-14 PROCEDURE — 124N000002 HC R&B MH UMMC

## 2022-07-14 PROCEDURE — 99233 SBSQ HOSP IP/OBS HIGH 50: CPT | Performed by: PSYCHIATRY & NEUROLOGY

## 2022-07-14 PROCEDURE — G0177 OPPS/PHP; TRAIN & EDUC SERV: HCPCS

## 2022-07-14 PROCEDURE — 250N000013 HC RX MED GY IP 250 OP 250 PS 637: Performed by: PSYCHIATRY & NEUROLOGY

## 2022-07-14 RX ORDER — BUPROPION HYDROCHLORIDE 150 MG/1
150 TABLET ORAL DAILY
Qty: 30 TABLET | Refills: 1 | Status: SHIPPED | OUTPATIENT
Start: 2022-07-15 | End: 2023-06-21

## 2022-07-14 RX ORDER — OLANZAPINE 10 MG/1
10 TABLET ORAL 3 TIMES DAILY PRN
Qty: 60 TABLET | Refills: 1 | Status: SHIPPED | OUTPATIENT
Start: 2022-07-14 | End: 2023-06-21

## 2022-07-14 RX ORDER — CETIRIZINE HYDROCHLORIDE 10 MG/1
10 TABLET ORAL DAILY
Qty: 90 TABLET | Refills: 3 | Status: SHIPPED | OUTPATIENT
Start: 2022-07-14 | End: 2023-06-21

## 2022-07-14 RX ORDER — KETOCONAZOLE 20 MG/ML
SHAMPOO TOPICAL
Qty: 120 ML | Refills: 3 | Status: SHIPPED | OUTPATIENT
Start: 2022-07-14 | End: 2023-06-21

## 2022-07-14 RX ORDER — IBUPROFEN 400 MG/1
400 TABLET, FILM COATED ORAL EVERY 6 HOURS PRN
Qty: 100 TABLET | Refills: 4 | Status: SHIPPED | OUTPATIENT
Start: 2022-07-14 | End: 2023-06-21

## 2022-07-14 RX ORDER — FLUTICASONE PROPIONATE 50 MCG
2 SPRAY, SUSPENSION (ML) NASAL DAILY
Qty: 16 G | Refills: 2 | Status: SHIPPED | OUTPATIENT
Start: 2022-07-14 | End: 2023-06-21

## 2022-07-14 RX ORDER — TRAZODONE HYDROCHLORIDE 50 MG/1
50 TABLET, FILM COATED ORAL
Qty: 30 TABLET | Refills: 1 | Status: SHIPPED | OUTPATIENT
Start: 2022-07-14 | End: 2023-01-31

## 2022-07-14 RX ORDER — LAMOTRIGINE 25 MG/1
50 TABLET ORAL DAILY
Qty: 60 TABLET | Refills: 1 | Status: SHIPPED | OUTPATIENT
Start: 2022-07-14 | End: 2023-01-31

## 2022-07-14 RX ORDER — HYDROXYZINE HYDROCHLORIDE 25 MG/1
25 TABLET, FILM COATED ORAL EVERY 4 HOURS PRN
Qty: 60 TABLET | Refills: 1 | Status: SHIPPED | OUTPATIENT
Start: 2022-07-14 | End: 2023-06-21

## 2022-07-14 RX ADMIN — CETIRIZINE HYDROCHLORIDE 10 MG: 10 TABLET, FILM COATED ORAL at 08:20

## 2022-07-14 RX ADMIN — LAMOTRIGINE 50 MG: 25 TABLET ORAL at 08:20

## 2022-07-14 RX ADMIN — FLUTICASONE PROPIONATE 2 SPRAY: 50 SPRAY, METERED NASAL at 08:32

## 2022-07-14 RX ADMIN — HYDROXYZINE HYDROCHLORIDE 25 MG: 25 TABLET ORAL at 20:09

## 2022-07-14 RX ADMIN — TRAZODONE HYDROCHLORIDE 50 MG: 50 TABLET ORAL at 20:09

## 2022-07-14 RX ADMIN — BUPROPION HYDROCHLORIDE 150 MG: 150 TABLET, EXTENDED RELEASE ORAL at 08:19

## 2022-07-14 RX ADMIN — MULTIPLE VITAMINS W/ MINERALS TAB 1 TABLET: TAB at 08:20

## 2022-07-14 ASSESSMENT — ACTIVITIES OF DAILY LIVING (ADL)
HYGIENE/GROOMING: INDEPENDENT
ADLS_ACUITY_SCORE: 28
DRESS: INDEPENDENT
ORAL_HYGIENE: INDEPENDENT
ADLS_ACUITY_SCORE: 28
LAUNDRY: WITH SUPERVISION
ADLS_ACUITY_SCORE: 28
ADLS_ACUITY_SCORE: 28
LAUNDRY: WITH SUPERVISION
ADLS_ACUITY_SCORE: 28
ORAL_HYGIENE: INDEPENDENT
DRESS: INDEPENDENT
ADLS_ACUITY_SCORE: 28
ADLS_ACUITY_SCORE: 28
HYGIENE/GROOMING: INDEPENDENT

## 2022-07-14 NOTE — PLAN OF CARE
Problem: Behavioral Health Plan of Care  Goal: Plan of Care Review  Recent Flowsheet Documentation  Taken 7/14/2022 1300 by Shonda Cheema RN  Plan of Care Reviewed With: patient  Patient Agreement with Plan of Care: agrees   Goal Outcome Evaluation:    Plan of Care Reviewed With: patient     Nursing Assessment    Patient evaluation continues. Assessed mood, anxiety, thoughts and behavior. Patient has a full range affect.  Speech is clear, coherent, and logical.  Thoughts are logical.  Mood is calm.  Patient denies SI, SIB, HI, and hallucinations.  Patient denies auditory or visual hallucinations. Is progressing toward goals. Is compliant with meds.  Appetite is good.  Encourage participation in groups and developing healthy coping skills. Will continue to assess.
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
## 2022-07-14 NOTE — PLAN OF CARE
Occupational Therapy Group Note:     07/14/22 1600   Group Therapy Session   Group Attendance attended group session   Time Session Began 1115   Time Session Ended 1215   Total Time patient participated (minutes) 60   Total # Attendees 4   Group Type life skill   Group Topic Covered balanced lifestyle;coping skills/lifestyle management;structured socialization   Group Session Detail discussion & gentle movement   Patient Response/Contribution cooperative with task;discussed personal experience with topic;expressed understanding of topic;listened actively   Patient Response Detail Pt actively participated in a structured occupational therapy group with a focus on social engagement and movement. Pt had the option to choose a self-reflection question or a gentle exercise/stretching movement. Pt appeared comfortable responding to discussion prompts, and also followed along with all guided movements. When prompted to identify what is most important in her life, she identified her sister and her spirituality. Affect appeared to brighten in interactions.

## 2022-07-14 NOTE — PLAN OF CARE
Occupational Therapy Group Note:     07/14/22 1300   Group Therapy Session   Group Attendance attended group session   Time Session Began 1015   Time Session Ended 1115   Total Time patient participated (minutes) 45   Total # Attendees 4-5   Group Type expressive therapy   Group Topic Covered coping skills/lifestyle management   Group Session Detail OT clinic   Patient Response/Contribution cooperative with task;organized   Patient Response Detail Pt actively participated in occupational therapy clinic to facilitate coping skill exploration, creative expression within personally meaningful activities, and clinical observation of social, cognitive, and kinesthetic performance skills. Pt response: Independent to initiate, gather materials, sequence, and adjust to workspace demands as needed. Demonstrated good focus, planning, and attention to detail for selected creative expression task. Able to ask for assistance as needed, and socialized with peers and staff. Pleasant and engaged.

## 2022-07-14 NOTE — PLAN OF CARE
Problem: Behavioral Health Plan of Care  Goal: Adheres to Safety Considerations for Self and Others  Outcome: Ongoing, Not Progressing  Intervention: Develop and Maintain Individualized Safety Plan  Recent Flowsheet Documentation  Taken 7/14/2022 0300 by Eliceo Coats RN  Safety Measures: safety rounds completed   Pt appeared to be sleeping during rounds.  NAD noted.  No complaints offered when awake.  7 hours of sleep was recorded tonight.

## 2022-07-14 NOTE — PROGRESS NOTES
Pt stayed in group for only a few moments. She said mood was happier earlier but not angry because she couldn't talk directly to her therapist in the community . Writer suggested her CTC would contact the therapist for her but she remained agitated and then left the group.
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
## 2022-07-14 NOTE — PROGRESS NOTES
Prior Authorization **INITIATED**    Medication: Olanzapine 10mg tabs - qty limit  Insurance Company: [employer] [facility] - Phone [phone] Fax [phone]  Pharmacy Filling the Rx: [place], [place] - [address]  Filling Pharmacy Phone: [phone]  Filling Pharmacy Fax: [phone]  Start Date: 7/14/2022  Reference #: CoverMyMeds Key: [id number]  Comments:  Plan limits to 1 tablet daily. Discharge pharmacy sent patient with supply while auth is pending. Will retroactively bill once determination received.      Suzan Ayers CPhT  [facility] Liaison  Pronouns: She/Her/Hers    [facility]  [address]  [address], [place] [address]   [email or web address]  [email or web address]   Phone: [phone]  Pager: [phone]  Fax: [phone]

## 2022-07-15 VITALS
OXYGEN SATURATION: 100 % | RESPIRATION RATE: 16 BRPM | TEMPERATURE: 98.2 F | HEART RATE: 94 BPM | HEIGHT: 67 IN | DIASTOLIC BLOOD PRESSURE: 83 MMHG | WEIGHT: 280.3 LBS | SYSTOLIC BLOOD PRESSURE: 120 MMHG | BODY MASS INDEX: 43.99 KG/M2

## 2022-07-15 PROCEDURE — 99239 HOSP IP/OBS DSCHRG MGMT >30: CPT | Performed by: PSYCHIATRY & NEUROLOGY

## 2022-07-15 PROCEDURE — 250N000013 HC RX MED GY IP 250 OP 250 PS 637: Performed by: PSYCHIATRY & NEUROLOGY

## 2022-07-15 RX ADMIN — FLUTICASONE PROPIONATE 2 SPRAY: 50 SPRAY, METERED NASAL at 07:44

## 2022-07-15 RX ADMIN — CETIRIZINE HYDROCHLORIDE 10 MG: 10 TABLET, FILM COATED ORAL at 07:43

## 2022-07-15 RX ADMIN — LAMOTRIGINE 50 MG: 25 TABLET ORAL at 07:44

## 2022-07-15 RX ADMIN — BUPROPION HYDROCHLORIDE 150 MG: 150 TABLET, EXTENDED RELEASE ORAL at 07:44

## 2022-07-15 RX ADMIN — MULTIPLE VITAMINS W/ MINERALS TAB 1 TABLET: TAB at 07:43

## 2022-07-15 ASSESSMENT — ACTIVITIES OF DAILY LIVING (ADL)
ADLS_ACUITY_SCORE: 28
DRESS: INDEPENDENT
ADLS_ACUITY_SCORE: 28
LAUNDRY: WITH SUPERVISION
HYGIENE/GROOMING: INDEPENDENT
ORAL_HYGIENE: INDEPENDENT
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
## 2022-07-15 NOTE — PLAN OF CARE
Problem: Mood Impairment (Depressive Signs/Symptoms)  Goal: Improved Mood Symptoms (Depressive Signs/Symptoms)  Intervention: Promote Mood Improvement  Recent Flowsheet Documentation  Taken 7/14/2022 1715 by Yuly Crooks RN  Supportive Measures:    positive reinforcement provided    self-reflection promoted  Diversional Activity: television         Goal Outcome Evaluation:    Plan of Care Reviewed With: patient     Monica has been visible in the community areas of the department this evening. She has been spending lots of time with a female peer. Has been dismissive to this staff member.She looks forward to discharging home tomorrow. She ate well for dinner. No Behavioral outbursts. She denies SI/SIB/AH/VH. She plans on discharging to sober living. Monica continues on elopement, SIB,Suicide,seizure and assault precautions. Nursing to continue to support current plan of care.

## 2022-07-15 NOTE — PLAN OF CARE
Assessment/Intervention/Current Symtoms and Care Coordination:  CTC discussed discharge with pt, gave letters for return to work and proof of hospital stay.      Discharge Plan or Goal:  Pt will discharge today; no barriers     Referral Status:  Appointments for psychiatry and therapy have been made     Legal Status:  Court hold   County: [place]  File Number: [id number]  [place] 22 [id number]    Pt : Cholo Rodriguez at [phone]       Contacts:  Pt : Cholo Rodriguez at [phone]  [facility]: [phone]; director: Stacey     Therapist: Sara Nguyen at [facility]  [address] [place] [address] [address], [place] [address]  Phone : [phone]   Email : [email or web address]     Psychiatrist:  Yazmin Mars NP  [facility]  [address] [place] [address]  [phone] Fax: [phone]     Upcoming Meetings/Important Dates:  Discharge today

## 2022-07-15 NOTE — PLAN OF CARE
Goal Outcome Evaluation:    Plan of Care Reviewed With: patient     Discharge orders are received.  Reviewed and discussed discharge instructions, follow up care, future appointments and medication administration.  Patient denies thoughts of SI, SIB or hallucinations.  Verbalized understanding of discharge instructions. Received personal belongings.  All forms are signed.  Patient to discharge to home.

## 2022-07-15 NOTE — PROGRESS NOTES
Social Work Note:    Reason for entering EMR:      SW at [facility] received phone call from Abundio Veliz of [facility] team, [phone] asking pt's disposition.      Abundio shared he received referral today from our hospital & should have met with pt before she discharged.      SW recalled pt's name, but knew [facility] staff did not discharge pt today, so opened EMR and quickly reviewed last SW note by writer, Melissa Sapp in EMR and connect with her and informed that Abundio was on my phone.      RIGOBERTO was able to transfer call directly to Melissa's desk.      DOMINIC [employer]  Care Transitions   Tele: [phone]

## 2022-07-15 NOTE — PLAN OF CARE
Problem: Sleep Disturbance (Depressive Signs/Symptoms)  Goal: Improved Sleep (Depressive Signs/Symptoms)  Intervention: Promote Healthy Sleep Hygiene  Recent Flowsheet Documentation  Taken 7/15/2022 0617 by Alcon Briceño RN  Sleep Hygiene Promotion: regular sleep pattern promoted   Goal Outcome Evaluation:        Pt slept most of the night for 7 hours. No safety concerns.

## 2022-07-20 NOTE — PROGRESS NOTES
Prior Authorization **APPROVED**    Authorization Effective Date: 4/15/2022  Authorization Expiration Date: 7/14/2023  Medication: Olanzapine 10mg tabs *APPROVED*  Approved Dose/Quantity: 3 tablets daily  Reference #: CoverMyMeds Key: [id number], Case #: [id number]   Insurance Company: [employer] - Phone [phone] Fax [phone]  Expected CoPay: $0.00     CoPay Card Available: No    Foundation Assistance Needed: n/a  Which Pharmacy is filling the prescription (Not needed for infusion/clinic administered): [facility] [place], [place] - [address]  Pharmacy Notified: Yes  Patient Notified: Yes  Comments:  Plan limits to 1 tablet daily. Discharge pharmacy sent patient with supply while auth is pending. *Retroactively Billed*        Suzan Ayers CPhT  [facility] Liaison  Pronouns: She/Her/Hers    [facility]  [address] [facility]  [address] [address]   [email or web address]  [email or web address]   Phone: [phone]  Pager: [phone]  Fax: [phone]

## 2022-10-22 ENCOUNTER — HEALTH MAINTENANCE LETTER (OUTPATIENT)
Age: 27
End: 2022-10-22

## 2023-01-30 ENCOUNTER — HOSPITAL ENCOUNTER (OUTPATIENT)
Facility: CLINIC | Age: 28
Setting detail: OBSERVATION
Discharge: OTHER INSTITUTION | End: 2023-01-31
Attending: EMERGENCY MEDICINE | Admitting: NURSE PRACTITIONER
Payer: MEDICAID

## 2023-01-30 DIAGNOSIS — G47.09 OTHER INSOMNIA: ICD-10-CM

## 2023-01-30 DIAGNOSIS — F39 MOOD DISORDER (H): ICD-10-CM

## 2023-01-30 DIAGNOSIS — R45.851 SUICIDAL IDEATION: ICD-10-CM

## 2023-01-30 DIAGNOSIS — F33.1 MODERATE RECURRENT MAJOR DEPRESSION (H): ICD-10-CM

## 2023-01-30 DIAGNOSIS — F41.9 ANXIETY: ICD-10-CM

## 2023-01-30 DIAGNOSIS — F43.10 PTSD (POST-TRAUMATIC STRESS DISORDER): ICD-10-CM

## 2023-01-30 DIAGNOSIS — F60.3 BORDERLINE PERSONALITY DISORDER (H): ICD-10-CM

## 2023-01-30 LAB — SARS-COV-2 RNA RESP QL NAA+PROBE: NEGATIVE

## 2023-01-30 PROCEDURE — 99285 EMERGENCY DEPT VISIT HI MDM: CPT | Mod: 25

## 2023-01-30 PROCEDURE — 90791 PSYCH DIAGNOSTIC EVALUATION: CPT

## 2023-01-30 PROCEDURE — G0378 HOSPITAL OBSERVATION PER HR: HCPCS

## 2023-01-30 PROCEDURE — U0005 INFEC AGEN DETEC AMPLI PROBE: HCPCS | Performed by: EMERGENCY MEDICINE

## 2023-01-30 PROCEDURE — 250N000013 HC RX MED GY IP 250 OP 250 PS 637: Performed by: NURSE PRACTITIONER

## 2023-01-30 PROCEDURE — C9803 HOPD COVID-19 SPEC COLLECT: HCPCS

## 2023-01-30 PROCEDURE — 99223 1ST HOSP IP/OBS HIGH 75: CPT | Performed by: NURSE PRACTITIONER

## 2023-01-30 RX ORDER — LAMOTRIGINE 25 MG/1
25 TABLET ORAL DAILY
Status: DISCONTINUED | OUTPATIENT
Start: 2023-01-31 | End: 2023-01-31 | Stop reason: HOSPADM

## 2023-01-30 RX ORDER — IBUPROFEN 400 MG/1
400 TABLET, FILM COATED ORAL EVERY 6 HOURS PRN
Status: DISCONTINUED | OUTPATIENT
Start: 2023-01-30 | End: 2023-01-31 | Stop reason: HOSPADM

## 2023-01-30 RX ORDER — GABAPENTIN 300 MG/1
300 CAPSULE ORAL 3 TIMES DAILY
Status: DISCONTINUED | OUTPATIENT
Start: 2023-01-30 | End: 2023-01-31 | Stop reason: HOSPADM

## 2023-01-30 RX ORDER — HYDROXYZINE HYDROCHLORIDE 25 MG/1
25 TABLET, FILM COATED ORAL 3 TIMES DAILY PRN
Status: DISCONTINUED | OUTPATIENT
Start: 2023-01-30 | End: 2023-01-31 | Stop reason: HOSPADM

## 2023-01-30 RX ORDER — BUPROPION HYDROCHLORIDE 150 MG/1
150 TABLET ORAL DAILY
Status: DISCONTINUED | OUTPATIENT
Start: 2023-01-31 | End: 2023-01-31 | Stop reason: HOSPADM

## 2023-01-30 RX ORDER — TRAZODONE HYDROCHLORIDE 50 MG/1
50 TABLET, FILM COATED ORAL
Status: DISCONTINUED | OUTPATIENT
Start: 2023-01-30 | End: 2023-01-31 | Stop reason: HOSPADM

## 2023-01-30 RX ADMIN — GABAPENTIN 300 MG: 300 CAPSULE ORAL at 21:44

## 2023-01-30 RX ADMIN — TRAZODONE HYDROCHLORIDE 50 MG: 50 TABLET ORAL at 21:44

## 2023-01-30 RX ADMIN — IBUPROFEN 400 MG: 400 TABLET ORAL at 17:36

## 2023-01-30 ASSESSMENT — COLUMBIA-SUICIDE SEVERITY RATING SCALE - C-SSRS
4. HAVE YOU HAD THESE THOUGHTS AND HAD SOME INTENTION OF ACTING ON THEM?: YES
REASONS FOR IDEATION LIFETIME: COMPLETELY TO END OR STOP THE PAIN (YOU COULDN'T GO ON LIVING WITH THE PAIN OR HOW YOU WERE FEELING)
ATTEMPT LIFETIME: YES
TOTAL  NUMBER OF ABORTED OR SELF INTERRUPTED ATTEMPTS LIFETIME: NO
ATTEMPT PAST THREE MONTHS: NO
2. HAVE YOU ACTUALLY HAD ANY THOUGHTS OF KILLING YOURSELF?: YES
3. HAVE YOU BEEN THINKING ABOUT HOW YOU MIGHT KILL YOURSELF?: YES
6. HAVE YOU EVER DONE ANYTHING, STARTED TO DO ANYTHING, OR PREPARED TO DO ANYTHING TO END YOUR LIFE?: YES
1. HAVE YOU WISHED YOU WERE DEAD OR WISHED YOU COULD GO TO SLEEP AND NOT WAKE UP?: YES
5. HAVE YOU STARTED TO WORK OUT OR WORKED OUT THE DETAILS OF HOW TO KILL YOURSELF? DO YOU INTEND TO CARRY OUT THIS PLAN?: YES
TOTAL  NUMBER OF ACTUAL ATTEMPTS LIFETIME: 3
4. HAVE YOU HAD THESE THOUGHTS AND HAD SOME INTENTION OF ACTING ON THEM?: YES
REASONS FOR IDEATION PAST MONTH: COMPLETELY TO END OR STOP THE PAIN (YOU COULDN'T GO ON LIVING WITH THE PAIN OR HOW YOU WERE FEELING)
1. IN THE PAST MONTH, HAVE YOU WISHED YOU WERE DEAD OR WISHED YOU COULD GO TO SLEEP AND NOT WAKE UP?: YES
TOTAL  NUMBER OF PREPARATORY ACTS LIFETIME: 3
5. HAVE YOU STARTED TO WORK OUT OR WORKED OUT THE DETAILS OF HOW TO KILL YOURSELF? DO YOU INTEND TO CARRY OUT THIS PLAN?: YES
TOTAL  NUMBER OF PREPARATORY ACTS PAST 3 MONTHS: 1
2. HAVE YOU ACTUALLY HAD ANY THOUGHTS OF KILLING YOURSELF?: YES
6. HAVE YOU EVER DONE ANYTHING, STARTED TO DO ANYTHING, OR PREPARED TO DO ANYTHING TO END YOUR LIFE?: YES
TOTAL  NUMBER OF INTERRUPTED ATTEMPTS LIFETIME: NO

## 2023-01-30 ASSESSMENT — ACTIVITIES OF DAILY LIVING (ADL)
ADLS_ACUITY_SCORE: 35

## 2023-01-30 ASSESSMENT — PATIENT HEALTH QUESTIONNAIRE - PHQ9: SUM OF ALL RESPONSES TO PHQ9 QUESTIONS 1 & 2: 2

## 2023-01-30 NOTE — ED NOTES
Bed: ED17  Expected date:   Expected time:   Means of arrival:   Comments:  [facility] - 451 - 27 F suicidal eta 1158

## 2023-01-30 NOTE — ED NOTES
[place] received phone call from Mikaela, friend, who stated she began getting concerning suicidal texts/calls from patient. Patient stated to Mikaela that she is intending to follow through with suicide plan once discharged. Wrote goodbye letter to Mikaela and children and reportedly placed in black bag along with phone, computer passwords, and celebration of life preferences.    Updated to include that patient does NOT have personal cell phone with her (locked up with belongings), but does have the room phone.
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
## 2023-01-30 NOTE — ED PROVIDER NOTES
"  History     Chief Complaint:  Suicidal       HPI   Monica Morales is a 27 year old female with a history of anxiety, depression, dextromethorphan overdose who presents for evaluation of suicidal ideation.  She reports that  another member of her rehab facility told her she is worthless and she should she resolve \"between the eyes.\"  This set off a lot of stress and she began having suicidal ideation.  She states she does not want to be here.  She does not tell me how long she is been having suicidal ideation.  She denies do anything to hurt herself.  She is quite tearful on my history taking and requested I leave.    Per nursing staff and EMS report, the patient made suicidal comments.  Nursing staff received a phone call from a friend named Mikaela who states that she has been getting concerning suicidal text and calls from the patient.  Patient also wrote a goodbye letter and gave information to friends and family about her possessions, passwords, and celebration of life preferences.  Please see the nursing notes for further details.      Independent Historian: None    Review of External Notes:     ROS:  Review of Systems    Allergies:  Latex    Medications:    Bupropion   Cetrizine   Fluticasone   Hydroxyzine  Lamotrigine  Olanzapine  Senna-docusate   Trazodone     Past Medical History:    Urinary retention  Dextromethorphan overdose  Serotonergic syndrome  Cannabis dependence  Anxiety   Depression   polysubstance abuse   Obesity   Allergic rhinitis     Family History:    family history includes Alcohol/Drug in her father; Asthma in her father; Depression in her mother; Diabetes in her mother; Hypertension in her father and paternal grandmother.    Social History:   reports that she has quit smoking. She has never used smokeless tobacco. She reports that she does not currently use alcohol. She reports that she does not currently use drugs.  PCP: Dipti Cm     Physical Exam     Patient Vitals for the " "past 24 hrs:   BP Temp Temp src Pulse Resp SpO2 Height   01/30/23 1417 (!) 148/78 98.7  F (37.1  C) -- 86 18 98 % --   01/30/23 1156 (!) 151/98 98.8  F (37.1  C) Temporal 81 18 100 % 1.702 m (5' 7\")        Physical Exam  Constitutional: Well appearing.  HEENT: Atraumatic.  PERRL.  EOMI.  Moist mucous membranes.  Neck: Soft.  Supple.   Respiratory: No respiratory distress.   Musculoskeletal: No edema.  Normal range of motion.  Neurologic: Alert and oriented.  Normal tone and bulk. Normal gait.  Skin: No rashes.  No edema.  Psych: Normal affect.  Normal behavior.  Tearful.  Expresses suicidal ideation.  No odd or manic behavior.  No pressured or tangential speech.    Emergency Department Course       Laboratory:  Labs Ordered and Resulted from Time of ED Arrival to Time of ED Departure   COVID-19 VIRUS (CORONAVIRUS) BY PCR - Normal       Result Value    SARS CoV2 PCR Negative          Emergency Department Course & Assessments:  Interventions:  Medications - No data to display     Social Determinants of Health affecting care:  Stress/Adjustment Disorders     Assessments:  1154 I obtained the history and examined the patient as noted above.     Disposition:  The patient was transferred to [facility].     Impression & Plan      Medical Decision Making:  Monica Morales is a 27-year-old woman who is afebrile and hemodynamically stable.  She is quite emotional and tearful on exam and requested to leave the room.  Concerning collateral information was obtained by nursing staff.  COVID test is negative.  She denies do anything to hurt her self.  She is initially speak to me calmly and clearly and denied any physical complaints.  Given her negative COVID test, I think she can be medically cleared at this point given her stable vital signs.  We had the DEC  come talk to her about assessment in the ER versus [facility] and she is apparently agreeable to go to [facility].  [facility] staff evaluate the patient will take her to the " [facility] unit and she was in stable condition at time of transfer    Diagnosis:    ICD-10-CM    1. Suicidal ideation  R45.851                 Scribe Disclosure:  I, Joshua Kjer, am serving as a scribe at 4:20 PM on 1/30/2023 to document services personally performed by Damir Arora MD based on my observations and the provider's statements to me.   1/30/2023   Damir Arora MD Salay, Nicholas J, MD  01/30/23 1804
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
## 2023-01-31 VITALS
SYSTOLIC BLOOD PRESSURE: 132 MMHG | HEART RATE: 60 BPM | OXYGEN SATURATION: 99 % | WEIGHT: 240 LBS | BODY MASS INDEX: 37.67 KG/M2 | TEMPERATURE: 97.4 F | DIASTOLIC BLOOD PRESSURE: 79 MMHG | HEIGHT: 67 IN | RESPIRATION RATE: 16 BRPM

## 2023-01-31 PROCEDURE — G0378 HOSPITAL OBSERVATION PER HR: HCPCS

## 2023-01-31 PROCEDURE — 99239 HOSP IP/OBS DSCHRG MGMT >30: CPT | Performed by: PSYCHIATRY & NEUROLOGY

## 2023-01-31 PROCEDURE — 250N000013 HC RX MED GY IP 250 OP 250 PS 637: Performed by: NURSE PRACTITIONER

## 2023-01-31 RX ORDER — TRAZODONE HYDROCHLORIDE 50 MG/1
25-50 TABLET, FILM COATED ORAL
Qty: 10 TABLET | Refills: 0 | Status: SHIPPED | OUTPATIENT
Start: 2023-01-31

## 2023-01-31 RX ORDER — LAMOTRIGINE 25 MG/1
TABLET ORAL
Qty: 41 TABLET | Refills: 0 | Status: SHIPPED | OUTPATIENT
Start: 2023-01-31 | End: 2023-06-21

## 2023-01-31 RX ADMIN — GABAPENTIN 300 MG: 300 CAPSULE ORAL at 08:40

## 2023-01-31 RX ADMIN — BUPROPION HYDROCHLORIDE 150 MG: 150 TABLET, FILM COATED, EXTENDED RELEASE ORAL at 08:41

## 2023-01-31 RX ADMIN — GABAPENTIN 300 MG: 300 CAPSULE ORAL at 14:18

## 2023-01-31 RX ADMIN — LAMOTRIGINE 25 MG: 25 TABLET ORAL at 08:41

## 2023-01-31 ASSESSMENT — ACTIVITIES OF DAILY LIVING (ADL)
ADLS_ACUITY_SCORE: 35

## 2023-01-31 ASSESSMENT — COLUMBIA-SUICIDE SEVERITY RATING SCALE - C-SSRS
ATTEMPT SINCE LAST CONTACT: NO
TOTAL  NUMBER OF ABORTED OR SELF INTERRUPTED ATTEMPTS SINCE LAST CONTACT: NO
LETHALITY/MEDICAL DAMAGE CODE MOST LETHAL ACTUAL ATTEMPT: SEVERE PHYSICAL DAMAGE, MEDICAL HOSPITALIZATION WITH INTENSIVE CARE REQUIRED
SUICIDE, SINCE LAST CONTACT: NO
MOST LETHAL DATE: 66289
2. HAVE YOU ACTUALLY HAD ANY THOUGHTS OF KILLING YOURSELF?: YES
6. HAVE YOU EVER DONE ANYTHING, STARTED TO DO ANYTHING, OR PREPARED TO DO ANYTHING TO END YOUR LIFE?: NO
1. SINCE LAST CONTACT, HAVE YOU WISHED YOU WERE DEAD OR WISHED YOU COULD GO TO SLEEP AND NOT WAKE UP?: NO
5. HAVE YOU STARTED TO WORK OUT OR WORKED OUT THE DETAILS OF HOW TO KILL YOURSELF? DO YOU INTEND TO CARRY OUT THIS PLAN?: NO
TOTAL  NUMBER OF INTERRUPTED ATTEMPTS SINCE LAST CONTACT: NO

## 2023-01-31 NOTE — ED PROVIDER NOTES
EmPATH Unit - Psychiatric Observation Discharge Summary  [facility]  Discharge Date: 1/31/2023    Monica Morales MRN: [id number]   Age: 27 year old YOB: 1995     Brief HPI & Initial ED Course     Chief Complaint   Patient presents with     Suicidal     HPI  Monica Morales is a 27 year old female with history notable for major depressive disorder, borderline personality disorder, PTSD and a question of bipolar disorder in the context of substance use disorders.  She is currently under observation status on the EmPATH unit following her initial meeting with ABY Buckley yesterday.  The patient was restarted on lamotrigine to address affective instability while noting good tolerability of the medication in the past.  Trazodone was used to help alleviate insomnia.  Overnight, there were no acute issues.  On reassessment today, the patient reports that the intensity of symptoms which preceded her arrival have since subsided.  The experience has also given her new appreciation for the benefits that she was previously receiving from lamotrigine.  She suspects that discontinuation of lamotrigine has led to some emergence of mood reactivity and anxiety.  She is tolerating the medication well while noting that her prior effective dose was 50 mg daily.  Yesterday evening, it took her longer to fall asleep than desired, noting about 45 minutes until sleep onset after taking trazodone.  She attributes that to the environment while noting that she typically falls asleep within 30 minutes after taking trazodone.  She is happy to continue taking the medication.  She denies suicidal thoughts today.  There was no indication of psychosis or homicidal thoughts.  She reflected on the positive work she has done at her residential treatment program and is looking forward to returning there to continue her progress.        Physical Examination   BP: 113/82  Pulse: 83  Temp: 97.6  F (36.4  C)  Resp:  "18  Height: 170.2 cm (5' 7\")  Weight: 108.9 kg (240 lb)  SpO2: 98 %    Physical Exam  General: Appears stated age.   Neuro: Alert and fully oriented. Extremities appear to demonstrate normal strength on visual inspection.   Integumentary/Skin: no rash visualized, normal color    Psychiatric Examination   Appearance: awake, alert  Attitude:  cooperative  Eye Contact:  fair  Mood:  better  Affect:  intensity is blunted  Speech:  clear, coherent  Psychomotor Behavior:  no evidence of tardive dyskinesia, dystonia, or tics  Thought Process:  linear  Associations:  no loose associations  Thought Content:  no evidence of suicidal ideation or homicidal ideation and no evidence of psychotic thought  Insight:  fair  Judgement:  fair  Oriented to:  time, person, and place  Attention Span and Concentration:  fair  Recent and Remote Memory:  fair  Language: able to name/identify objects without impairment  Fund of Knowledge: intact with awareness of current and past events    Results        Labs Ordered and Resulted from Time of ED Arrival to Time of ED Departure   COVID-19 VIRUS (CORONAVIRUS) BY PCR - Normal       Result Value    SARS CoV2 PCR Negative         Observation Course   The patient was found to have a psychiatric condition that would benefit from an observation stay in the emergency department for further psychiatric stabilization and/or coordination of a safe disposition. The plan upon observation admission included serial assessments of psychiatric condition, potential administration of medications if indicated, further disposition pending the patient's psychiatric course during the monitoring period.     Serial assessments of the patient's psychiatric condition were performed. Nursing notes were reviewed. During the observation period, the patient did not require medications for agitation, and did not require restraints/seclusion for patient and/or provider safety.     After a period of working with the treatment " team on the EmPATH unit, the patient's mental state improved to allow a safe transition to outpatient care. After counseling on the diagnosis, work-up, and treatment plan, the patient was discharged. Close follow-up with a psychiatrist and/or therapist was recommended and community psychiatric resources were provided. Patient is to return to the ED if any urgent or potentially life-threatening concerns.     Discharge Diagnoses:   Final diagnoses:   Suicidal ideation   Anxiety   Mood disorder (H)   PTSD (post-traumatic stress disorder)   Borderline personality disorder (H)       Treatment Plan:  -Lamotrigine was restarted on arrival at 25 mg daily, which should be continued for 13 more days, then increase to 50 mg daily.  Her response may then be reassessed to determine if higher doses would be beneficial or indicated.  -Trazodone has been restarted and the dose will be increased to allow a range of 25 to 50 mg as needed for reduction of insomnia.  -Resume outpatient medication management appointments and psychotherapy  -Transfer back to her residential treatment program today.    At the time of discharge, the patient's acute suicide risk was determined to be low due to the following factors: Reduction in the intensity of mood/anxiety symptoms that preceded the admission, denial of suicidal thoughts, denies feeling helpless or helpless, not currently under the influence of alcohol or illicit substances, denies experiencing command hallucinations, no immediate access to firearms. The patient's acute risk could be higher if noncompliant with their treatment plan, medications, follow-up appointments or using illicit substances or alcohol. Protective factors include: social supports    I spent more than 30 minutes on discharge day activities.    --  Jeff Fernandez MD  [facility] EMERGENCY DEPT  EmPATH Unit  1/31/2023      Jeff Fernandez MD  01/31/23 [id number]
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
## 2023-01-31 NOTE — ED NOTES
Monica had a good morning, she feels relaxed and mood is calm.  She is complaint with medications, denies any suicidal ideation .  She is looking forward to go back to [facility].
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
## 2023-01-31 NOTE — PROGRESS NOTES
Patient slept through the night uneventfully. Changing positions occasionally. Respirations even and unlabored. No signs of distress noted. Plan for reassessment today.

## 2023-01-31 NOTE — ED NOTES
Patient agreeable to discharge plan. Discharge instructions reviewed with patient including follow-up care plan. Patient will follow up with outpatient providers through [facility]. Medications: discharged with Lamictal and Trazodone from discharge pharmacy. Reviewed safety plan and outpatient resources. Denies SI and HI at time of discharge. All belongings that were brought into the hospital have been returned to patient. Escorted off the unit at 1610 accompanied by [facility] staff. Discharged to [facility] residential treatment program via taxi.

## 2023-01-31 NOTE — DISCHARGE INSTRUCTIONS
Aftercare Plan    Follow up with established providers and supports as scheduled. Continue taking medications as prescribed. Abstain from drugs and alcohol. Utilize your [facility] mental health crisis team as needed. They are available 24/7. Contact information is listed below.       If I am feeling unsafe or I am in a crisis, I will:   Contact my established care providers   Call the [place] Suicide Prevention Lifeline: [phone]   Go to the nearest emergency room   Call 911     Warning signs that I or other people might notice when a crisis is developing for me: changes to sleep, appetite or mood, increased anger, agitation or irritability, feeling depressed or hopeless, spending more time alone or talking less, increased crying, decreased productivity, seeing or hearing things that aren't there, thoughts of not wanting to live anymore or of actually killing myself, thoughts of hurting others    Things I am able to do on my own to cope or help me feel better: watching a favorite tv show or movie, listening to music I enjoy, going outside and breathing fresh air, going for a walk or exercising, taking a shower or bath, a cold or hot beverage, a healthy snack, drawing/coloring/painting, journaling, singing or dancing, deep breathing     I can try practicing square breathing when I begin to feel anxious - inhale through the nose for the count of 4 and the first line on the square. Exhale through the mouth for the count of 4 for the second line of the square. Repeat to complete the square. Repeat the square as many times as needed.    I can also use my five senses to practice mindfulness and grounding. What are five things I can see, four things I can hear, three things I can feel, two things I can smell, and one thing I can taste.     Things that I am able to do with others to cope or help me feel better: sometimes just talking or spending time with someone else, sharing a meal or having coffee, watching a movie or  "playing a game, going for a walk or exercising    I can also use community resources including mental health hotlines, [facility] crisis teams, or apps.     Things I can use or do for distraction: movies/tv, music, reading, games, drawing/coloring/painting or other art, essential oils, exercise, cleaning/organizing, puzzles, crossword puzzles, word search, Sudoku       I can also download a meditation or relaxation penny, like Calm, Headspace, or Insight Timer (all three offer a free version)    Changes I can make to support my mental health and wellness: Attend scheduled mental health therapy and psychiatric appointments. Take my medications as prescribed. Maintain a daily schedule/routine. Abstain from all mood altering substances, including drugs, alcohol, or medications not currently prescribed to me. Implement a self-care routine.      People in my life that I can ask for help: friends or family, trusted teachers/staff/colleagues, trusted members of my community or place of Zoroastrianism, mental health crisis lines, or 911    Your [facility] has a mental health crisis team you can call 24/7: [facility] Adult, [phone]    Other things that are important when I m in crisis: to remember that the feelings I am having right now are temporary, and it won't feel like this forever, and that it is okay and important to ask for help    Crisis Lines  Crisis Text Line  Text 929929  You will be connected with a trained live crisis counselor to provide support.    National Hope Line  1.800.SUICIDE [4377079]      Community Resources  Fast Tracker  Linking people to mental health and substance use disorder resources  fasttrackermn.org     [place] Mental Health Warm Line  Peer to peer support  Monday thru Saturday, 12 pm to 10 pm  869.532.3190 or 9.721.800.3584  Text \"Support\" to 83830    National [place] on Mental Illness (LIBERTAD)  122.387.5654 or 1.888.LIBERTAD.HELPS      Mental Health Apps       Anxiety Release  based on " EMDR  Https://anxietyreleaseapp.com/      My3  https://my3app.org/    VirtualHopeBox  https://[employer]/apps/virtual-hope-box/      Additional Information  Today you were seen by a licensed mental health professional through Triage and Transition services, [facility] ([facility])  for a crisis assessment in the Emergency Department at [facility].  It is recommended that you follow up with your established providers (psychiatrist, mental health therapist, and/or primary care doctor - as relevant) as soon as possible. Coordinators from [facility] will be calling you in the next 24-48 hours to ensure that you have the resources you need.  You can also contact [facility] coordinators directly at [phone]. You may have been scheduled for or offered an appointment with a mental health provider. [facility] maintains an extensive network of licensed behavioral health providers to connect patients with the services they need.  We do not charge providers a fee to participate in our referral network.  We match patients with providers based on a patient's specific needs, insurance coverage, and location.  Our first effort will be to refer you to a provider within your care system, and will utilize providers outside your care system as needed.

## 2023-01-31 NOTE — ED NOTES
Pt in agreement with plan to stay overnight on observation. Pt spent evening napping in recliner chair. Pt ate 100% of dinner. Pt endorses some suicidal thoughts throughout the evening but states she does not have a plan at this time. Pt presents as guarded and soft spoken. Pt is sleeping in Sensory Room C; respirations even and unlabored. Will continue to monitor.

## 2023-01-31 NOTE — ED NOTES
LMHP Note:      Patient was invited to attend group programming and declined.       JAYCEE Carrillo on 1/30/2023 at 7:49 PM

## 2023-01-31 NOTE — PLAN OF CARE
[facility]  Plan of Care Hand-off Note     Patient Care Path: Observation    Plan for Care:     Pt has made recent comments to facility staff and her best friend suggesting plan and intent for suicide. She also wrote a goodbye letter to her best friend's family today with instructions for her  and her phone and computer passwords. This writer is recommending inpatient hospitalization for pt but physician is recommending that pt be placed on observation status overnight with reassessment of suicidal ideation in the morning. Pt is agreeable to remaining on observation status overnight.    Critical Safety Issues: suicidal ideation    Overview:  This patient is a child/adolescent: No    This patient has additional special visitor precautions: No    Legal Status: Voluntary    Contacts:   Mikaela Mancini, friend, PH: [phone]  Sparkle ALONDRA treatment facility nurse, PH: [phone] (release has been signed for [facility])    Psychiatry Consult:  Psychiatry Consult not requested because pt is on [facility] with access to psychiatry    Updated RN and Attending Provider regarding plan of care.    RODRIGUEZ Thomas

## 2023-02-21 ENCOUNTER — OFFICE VISIT (OUTPATIENT)
Dept: URGENT CARE | Facility: URGENT CARE | Age: 28
End: 2023-02-21
Payer: MEDICAID

## 2023-02-21 VITALS
OXYGEN SATURATION: 100 % | HEART RATE: 67 BPM | TEMPERATURE: 97.3 F | SYSTOLIC BLOOD PRESSURE: 125 MMHG | WEIGHT: 235.4 LBS | BODY MASS INDEX: 36.87 KG/M2 | DIASTOLIC BLOOD PRESSURE: 81 MMHG

## 2023-02-21 DIAGNOSIS — J03.90 TONSILLITIS: Primary | ICD-10-CM

## 2023-02-21 LAB
DEPRECATED S PYO AG THROAT QL EIA: NEGATIVE
GROUP A STREP BY PCR: NOT DETECTED

## 2023-02-21 PROCEDURE — 99213 OFFICE O/P EST LOW 20 MIN: CPT | Performed by: PHYSICIAN ASSISTANT

## 2023-02-21 PROCEDURE — 87651 STREP A DNA AMP PROBE: CPT | Performed by: PHYSICIAN ASSISTANT

## 2023-02-21 RX ORDER — AMOXICILLIN 500 MG/1
500 CAPSULE ORAL 2 TIMES DAILY
Qty: 20 CAPSULE | Refills: 0 | Status: SHIPPED | OUTPATIENT
Start: 2023-02-21 | End: 2023-03-03

## 2023-02-21 RX ORDER — LIDOCAINE HYDROCHLORIDE 20 MG/ML
15 SOLUTION OROPHARYNGEAL
Qty: 100 ML | Refills: 0 | Status: SHIPPED | OUTPATIENT
Start: 2023-02-21 | End: 2023-06-21

## 2023-02-21 ASSESSMENT — ENCOUNTER SYMPTOMS
PALPITATIONS: 0
CHILLS: 0
GASTROINTESTINAL NEGATIVE: 1
COUGH: 1
WHEEZING: 0
SHORTNESS OF BREATH: 0
SORE THROAT: 1
SINUS PRESSURE: 0
SINUS PAIN: 0
FATIGUE: 0
RHINORRHEA: 1
FEVER: 1
CARDIOVASCULAR NEGATIVE: 1

## 2023-02-22 NOTE — PROGRESS NOTES
Marisol Anderson is a 27 year old, presenting for the following health issues:  Pharyngitis    HPI   Acute Illness  Acute illness concerns:   Onset/Duration: 3days  Symptoms:  Fever: YES  Chills/Sweats: No  Headache (location?): No  Sinus Pressure: No  Conjunctivitis:  No  Ear Pain: no  Rhinorrhea: YES  Congestion: YES  Sore Throat: YES, with white spots in her throat  Cough: YES - mild  Wheeze: No  Decreased Appetite: No  Nausea: No  Vomiting: No  Diarrhea: No  Dysuria/Freq.: No  Dysuria or Hematuria: No  Fatigue/Achiness: YES  Sick/Strep Exposure: No  Therapies tried and outcome: rest, fluids, with minimal relief    Patient Active Problem List   Diagnosis     Obesity     Substance abuse (H)     Seasonal allergic rhinitis due to pollen     Routine general medical examination at a health care facility     Anxiety and depression     Papanicolaou smear of cervix with low grade squamous intraepithelial lesion (LGSIL)     Allergic rhinitis     Suicide ideation     Morbid obesity (H)     Drug overdose, undetermined intent, initial encounter     Suicide attempt (H)     Current Outpatient Medications   Medication     buPROPion (WELLBUTRIN XL) 150 MG 24 hr tablet     cetirizine (ZYRTEC) 10 MG tablet     fluticasone (FLONASE) 50 MCG/ACT nasal spray     hydrOXYzine (ATARAX) 25 MG tablet     ibuprofen (ADVIL/MOTRIN) 400 MG tablet     ketoconazole (NIZORAL) 2 % external shampoo     lamoTRIgine (LAMICTAL) 25 MG tablet     Multiple Vitamins-Minerals (MULTIVITAMIN ADULTS) TABS     OLANZapine (ZYPREXA) 10 MG tablet     traZODone (DESYREL) 50 MG tablet     No current facility-administered medications for this visit.        Allergies   Allergen Reactions     Latex      Seasonal Allergies Itching       Review of Systems   Constitutional: Positive for fever. Negative for chills and fatigue.   HENT: Positive for congestion, rhinorrhea and sore throat. Negative for ear discharge, ear pain, hearing loss, sinus pressure and sinus  pain.    Respiratory: Positive for cough. Negative for shortness of breath and wheezing.    Cardiovascular: Negative.  Negative for chest pain, palpitations and peripheral edema.   Gastrointestinal: Negative.    Allergic/Immunologic: Negative for environmental allergies.   All other systems reviewed and are negative.           Objective    /81   Pulse 67   Temp 97.3  F (36.3  C) (Tympanic)   Wt 106.8 kg (235 lb 6.4 oz)   SpO2 100%   BMI 36.87 kg/m    Body mass index is 36.87 kg/m .  Physical Exam  Vitals and nursing note reviewed.   Constitutional:       General: She is not in acute distress.     Appearance: Normal appearance. She is well-developed and normal weight. She is not ill-appearing.   HENT:      Head: Normocephalic and atraumatic.      Comments: TMs are intact without any erythema or bulging bilaterally.  Airway is patent.     Nose: Nose normal.      Mouth/Throat:      Lips: Pink.      Mouth: Mucous membranes are moist.      Pharynx: Uvula midline. Pharyngeal swelling, oropharyngeal exudate and posterior oropharyngeal erythema present.      Tonsils: Tonsillar exudate present. No tonsillar abscesses. 2+ on the right. 2+ on the left.   Eyes:      General: No scleral icterus.     Extraocular Movements: Extraocular movements intact.      Conjunctiva/sclera: Conjunctivae normal.      Pupils: Pupils are equal, round, and reactive to light.   Neck:      Thyroid: No thyromegaly.   Cardiovascular:      Rate and Rhythm: Normal rate and regular rhythm.      Pulses: Normal pulses.      Heart sounds: Normal heart sounds, S1 normal and S2 normal. No murmur heard.    No friction rub. No gallop.   Pulmonary:      Effort: Pulmonary effort is normal. No accessory muscle usage, respiratory distress or retractions.      Breath sounds: Normal breath sounds and air entry. No stridor. No decreased breath sounds, wheezing, rhonchi or rales.   Musculoskeletal:      Cervical back: Normal range of motion and neck supple.    Lymphadenopathy:      Head:      Right side of head: Tonsillar adenopathy present.      Left side of head: Tonsillar adenopathy present.      Cervical: No cervical adenopathy.   Skin:     General: Skin is warm and dry.      Nails: There is no clubbing.   Neurological:      Mental Status: She is alert and oriented to person, place, and time.   Psychiatric:         Mood and Affect: Mood normal.         Behavior: Behavior normal.         Thought Content: Thought content normal.         Judgment: Judgment normal.       Results for orders placed or performed in visit on 02/21/23 (from the past 24 hour(s))   Streptococcus A Rapid Screen w/Reflex to PCR - Clinic Collect    Specimen: Throat; Swab   Result Value Ref Range    Group A Strep antigen Negative Negative       Assessment/Plan:  Tonsillitis:  RST is negative, will send for strep PCR.  Will treat for tonsillitis with [id number] based on H&P and give oral viscous lidocaine as needed for sore throat.  Recommend tylenol/ibuprofen prn pain/fever, warm salt water gargles, lozenges or cough drops.  Recheck in clinic if symptoms worsen or if symptoms do not improve.    -     Streptococcus A Rapid Screen w/Reflex to PCR - Clinic Collect  -     Group A Streptococcus PCR Throat Swab  -     amoxicillin (AMOXIL) 500 MG capsule; Take 1 capsule (500 mg) by mouth 2 times daily for 10 days  -     lidocaine, viscous, (XYLOCAINE) 2 % solution; Swish and spit 15 mLs in mouth every 3 hours as needed for moderate pain (4-6) ; Max 8 doses/24 hour period.        Kelsey Lan PA-C

## 2023-06-01 ENCOUNTER — HEALTH MAINTENANCE LETTER (OUTPATIENT)
Age: 28
End: 2023-06-01

## 2023-06-02 ENCOUNTER — TELEPHONE (OUTPATIENT)
Dept: FAMILY MEDICINE | Facility: CLINIC | Age: 28
End: 2023-06-02
Payer: COMMERCIAL

## 2023-06-20 ASSESSMENT — ENCOUNTER SYMPTOMS
DIZZINESS: 0
PALPITATIONS: 0
DIARRHEA: 0
CONSTIPATION: 0
FEVER: 0
FREQUENCY: 0
HEARTBURN: 0
CHILLS: 0
ARTHRALGIAS: 0
JOINT SWELLING: 0
HEADACHES: 0
SHORTNESS OF BREATH: 0
EYE PAIN: 0
MYALGIAS: 0
NERVOUS/ANXIOUS: 0
DYSURIA: 0
PARESTHESIAS: 0
BREAST MASS: 0
HEMATURIA: 0
WEAKNESS: 0
COUGH: 0
SORE THROAT: 0
ABDOMINAL PAIN: 0
HEMATOCHEZIA: 0
NAUSEA: 0

## 2023-06-20 ASSESSMENT — PATIENT HEALTH QUESTIONNAIRE - PHQ9
10. IF YOU CHECKED OFF ANY PROBLEMS, HOW DIFFICULT HAVE THESE PROBLEMS MADE IT FOR YOU TO DO YOUR WORK, TAKE CARE OF THINGS AT HOME, OR GET ALONG WITH OTHER PEOPLE: NOT DIFFICULT AT ALL
SUM OF ALL RESPONSES TO PHQ QUESTIONS 1-9: 0
SUM OF ALL RESPONSES TO PHQ QUESTIONS 1-9: 0

## 2023-06-21 ENCOUNTER — OFFICE VISIT (OUTPATIENT)
Dept: FAMILY MEDICINE | Facility: CLINIC | Age: 28
End: 2023-06-21
Payer: COMMERCIAL

## 2023-06-21 VITALS
OXYGEN SATURATION: 98 % | WEIGHT: 228 LBS | BODY MASS INDEX: 35.79 KG/M2 | SYSTOLIC BLOOD PRESSURE: 124 MMHG | TEMPERATURE: 98.4 F | DIASTOLIC BLOOD PRESSURE: 76 MMHG | HEIGHT: 67 IN | HEART RATE: 85 BPM | RESPIRATION RATE: 18 BRPM

## 2023-06-21 DIAGNOSIS — N89.8 VAGINAL DISCHARGE: ICD-10-CM

## 2023-06-21 DIAGNOSIS — Z13.220 SCREENING FOR LIPID DISORDERS: ICD-10-CM

## 2023-06-21 DIAGNOSIS — Z87.42 HISTORY OF ABNORMAL CERVICAL PAP SMEAR: ICD-10-CM

## 2023-06-21 DIAGNOSIS — F33.1 MODERATE RECURRENT MAJOR DEPRESSION (H): ICD-10-CM

## 2023-06-21 DIAGNOSIS — J30.1 SEASONAL ALLERGIC RHINITIS DUE TO POLLEN: ICD-10-CM

## 2023-06-21 DIAGNOSIS — R73.03 PREDIABETES: ICD-10-CM

## 2023-06-21 DIAGNOSIS — Z12.4 CERVICAL CANCER SCREENING: ICD-10-CM

## 2023-06-21 DIAGNOSIS — T14.8XXA MOREL LAVALLEE LESION: ICD-10-CM

## 2023-06-21 DIAGNOSIS — Z00.00 ROUTINE GENERAL MEDICAL EXAMINATION AT A HEALTH CARE FACILITY: Primary | ICD-10-CM

## 2023-06-21 DIAGNOSIS — F41.9 ANXIETY AND DEPRESSION: ICD-10-CM

## 2023-06-21 DIAGNOSIS — F32.A ANXIETY AND DEPRESSION: ICD-10-CM

## 2023-06-21 DIAGNOSIS — F31.81 BIPOLAR II DISORDER (H): ICD-10-CM

## 2023-06-21 LAB
ALBUMIN SERPL BCG-MCNC: 4.3 G/DL (ref 3.5–5.2)
ALP SERPL-CCNC: 103 U/L (ref 35–104)
ALT SERPL W P-5'-P-CCNC: 16 U/L (ref 0–50)
ANION GAP SERPL CALCULATED.3IONS-SCNC: 12 MMOL/L (ref 7–15)
AST SERPL W P-5'-P-CCNC: 17 U/L (ref 0–45)
BASOPHILS # BLD AUTO: 0 10E3/UL (ref 0–0.2)
BASOPHILS NFR BLD AUTO: 0 %
BILIRUB SERPL-MCNC: 0.3 MG/DL
BUN SERPL-MCNC: 13.5 MG/DL (ref 6–20)
CALCIUM SERPL-MCNC: 9.6 MG/DL (ref 8.6–10)
CHLORIDE SERPL-SCNC: 105 MMOL/L (ref 98–107)
CHOLEST SERPL-MCNC: 147 MG/DL
CLUE CELLS: ABNORMAL
CREAT SERPL-MCNC: 0.89 MG/DL (ref 0.51–0.95)
DEPRECATED HCO3 PLAS-SCNC: 24 MMOL/L (ref 22–29)
EOSINOPHIL # BLD AUTO: 0.1 10E3/UL (ref 0–0.7)
EOSINOPHIL NFR BLD AUTO: 1 %
ERYTHROCYTE [DISTWIDTH] IN BLOOD BY AUTOMATED COUNT: 11.8 % (ref 10–15)
GFR SERPL CREATININE-BSD FRML MDRD: >90 ML/MIN/1.73M2
GLUCOSE SERPL-MCNC: 94 MG/DL (ref 70–99)
HBA1C MFR BLD: 5.3 % (ref 0–5.6)
HCT VFR BLD AUTO: 40.1 % (ref 35–47)
HDLC SERPL-MCNC: 50 MG/DL
HGB BLD-MCNC: 13.3 G/DL (ref 11.7–15.7)
IMM GRANULOCYTES # BLD: 0 10E3/UL
IMM GRANULOCYTES NFR BLD: 0 %
LDLC SERPL CALC-MCNC: 75 MG/DL
LYMPHOCYTES # BLD AUTO: 1.8 10E3/UL (ref 0.8–5.3)
LYMPHOCYTES NFR BLD AUTO: 18 %
MCH RBC QN AUTO: 29.8 PG (ref 26.5–33)
MCHC RBC AUTO-ENTMCNC: 33.2 G/DL (ref 31.5–36.5)
MCV RBC AUTO: 90 FL (ref 78–100)
MONOCYTES # BLD AUTO: 0.5 10E3/UL (ref 0–1.3)
MONOCYTES NFR BLD AUTO: 5 %
NEUTROPHILS # BLD AUTO: 7.2 10E3/UL (ref 1.6–8.3)
NEUTROPHILS NFR BLD AUTO: 75 %
NONHDLC SERPL-MCNC: 97 MG/DL
PLATELET # BLD AUTO: 243 10E3/UL (ref 150–450)
POTASSIUM SERPL-SCNC: 4.4 MMOL/L (ref 3.4–5.3)
PROT SERPL-MCNC: 7.1 G/DL (ref 6.4–8.3)
RBC # BLD AUTO: 4.47 10E6/UL (ref 3.8–5.2)
SODIUM SERPL-SCNC: 141 MMOL/L (ref 136–145)
TRICHOMONAS, WET PREP: ABNORMAL
TRIGL SERPL-MCNC: 112 MG/DL
WBC # BLD AUTO: 9.6 10E3/UL (ref 4–11)
WBC'S/HIGH POWER FIELD, WET PREP: ABNORMAL
YEAST, WET PREP: ABNORMAL

## 2023-06-21 PROCEDURE — 80053 COMPREHEN METABOLIC PANEL: CPT

## 2023-06-21 PROCEDURE — 85025 COMPLETE CBC W/AUTO DIFF WBC: CPT

## 2023-06-21 PROCEDURE — 99214 OFFICE O/P EST MOD 30 MIN: CPT | Mod: 25

## 2023-06-21 PROCEDURE — 83036 HEMOGLOBIN GLYCOSYLATED A1C: CPT

## 2023-06-21 PROCEDURE — 99395 PREV VISIT EST AGE 18-39: CPT

## 2023-06-21 PROCEDURE — 80061 LIPID PANEL: CPT

## 2023-06-21 PROCEDURE — 36415 COLL VENOUS BLD VENIPUNCTURE: CPT

## 2023-06-21 PROCEDURE — 87210 SMEAR WET MOUNT SALINE/INK: CPT

## 2023-06-21 RX ORDER — FLUTICASONE PROPIONATE 50 MCG
2 SPRAY, SUSPENSION (ML) NASAL DAILY
Qty: 16 G | Refills: 2 | Status: SHIPPED | OUTPATIENT
Start: 2023-06-21

## 2023-06-21 RX ORDER — LANOLIN ALCOHOL/MO/W.PET/CERES
6 CREAM (GRAM) TOPICAL
Qty: 90 TABLET | Refills: 3 | Status: SHIPPED | OUTPATIENT
Start: 2023-06-21

## 2023-06-21 RX ORDER — PROPRANOLOL HYDROCHLORIDE 10 MG/1
10 TABLET ORAL 3 TIMES DAILY
Qty: 270 TABLET | Refills: 3 | Status: SHIPPED | OUTPATIENT
Start: 2023-06-21 | End: 2024-07-18

## 2023-06-21 RX ORDER — BUSPIRONE HYDROCHLORIDE 10 MG/1
10 TABLET ORAL 3 TIMES DAILY
COMMUNITY
End: 2024-07-18

## 2023-06-21 RX ORDER — CETIRIZINE HYDROCHLORIDE 10 MG/1
10 TABLET ORAL DAILY
Qty: 90 TABLET | Refills: 3 | Status: SHIPPED | OUTPATIENT
Start: 2023-06-21

## 2023-06-21 RX ORDER — LAMOTRIGINE 25 MG/1
TABLET ORAL
Qty: 41 TABLET | Refills: 0 | Status: CANCELLED | OUTPATIENT
Start: 2023-06-21 | End: 2023-07-18

## 2023-06-21 RX ORDER — BUSPIRONE HYDROCHLORIDE 15 MG/1
15 TABLET ORAL 2 TIMES DAILY PRN
COMMUNITY
End: 2024-07-18

## 2023-06-21 RX ORDER — ARIPIPRAZOLE 10 MG/1
10 TABLET ORAL DAILY
COMMUNITY

## 2023-06-21 ASSESSMENT — ENCOUNTER SYMPTOMS
BREAST MASS: 0
FREQUENCY: 0
HEADACHES: 0
EYE PAIN: 0
HEARTBURN: 0
ABDOMINAL PAIN: 0
CHILLS: 0
SORE THROAT: 0
COUGH: 0
NERVOUS/ANXIOUS: 0
SHORTNESS OF BREATH: 0
FEVER: 0
JOINT SWELLING: 0
DIARRHEA: 0
CONSTIPATION: 0
HEMATURIA: 0
MYALGIAS: 0
HEMATOCHEZIA: 0
ARTHRALGIAS: 0
NAUSEA: 0
PALPITATIONS: 0
DIZZINESS: 0
WEAKNESS: 0
PARESTHESIAS: 0
DYSURIA: 0
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
## 2023-06-23 PROBLEM — F31.81 BIPOLAR II DISORDER (H): Status: ACTIVE | Noted: 2023-06-23

## 2023-06-23 RX ORDER — HYDROXYZINE PAMOATE 50 MG/1
50 CAPSULE ORAL EVERY 6 HOURS PRN
COMMUNITY

## 2023-06-23 RX ORDER — HYDROXYZINE PAMOATE 50 MG/1
50 CAPSULE ORAL AT BEDTIME
COMMUNITY
End: 2023-07-06

## 2023-06-23 RX ORDER — LAMOTRIGINE 100 MG/1
100 TABLET ORAL DAILY
COMMUNITY

## 2023-06-28 ENCOUNTER — LAB (OUTPATIENT)
Dept: LAB | Facility: CLINIC | Age: 28
End: 2023-06-28
Payer: COMMERCIAL

## 2023-06-28 DIAGNOSIS — N89.8 VAGINAL DISCHARGE: ICD-10-CM

## 2023-06-28 PROCEDURE — 87491 CHLMYD TRACH DNA AMP PROBE: CPT

## 2023-06-28 PROCEDURE — 87591 N.GONORRHOEAE DNA AMP PROB: CPT

## 2023-06-29 LAB
C TRACH DNA SPEC QL NAA+PROBE: NEGATIVE
N GONORRHOEA DNA SPEC QL NAA+PROBE: NEGATIVE

## 2023-07-06 ENCOUNTER — OFFICE VISIT (OUTPATIENT)
Dept: SURGERY | Facility: CLINIC | Age: 28
End: 2023-07-06
Payer: COMMERCIAL

## 2023-07-06 VITALS — WEIGHT: 224 LBS | SYSTOLIC BLOOD PRESSURE: 124 MMHG | BODY MASS INDEX: 35.08 KG/M2 | DIASTOLIC BLOOD PRESSURE: 72 MMHG

## 2023-07-06 DIAGNOSIS — M79.89 PALPABLE MASS OF SOFT TISSUE OF THIGH: Primary | ICD-10-CM

## 2023-07-06 DIAGNOSIS — T14.8XXA MOREL LAVALLEE LESION: ICD-10-CM

## 2023-07-06 PROCEDURE — 99204 OFFICE O/P NEW MOD 45 MIN: CPT | Mod: 25 | Performed by: SURGERY

## 2023-07-06 PROCEDURE — 10160 PNXR ASPIR ABSC HMTMA BULLA: CPT | Performed by: SURGERY
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
## 2023-07-07 ENCOUNTER — TELEPHONE (OUTPATIENT)
Dept: FAMILY MEDICINE | Facility: CLINIC | Age: 28
End: 2023-07-07
Payer: COMMERCIAL

## 2023-07-07 PROBLEM — M79.89 PALPABLE MASS OF SOFT TISSUE OF THIGH: Status: ACTIVE | Noted: 2023-07-06

## 2023-07-07 NOTE — TELEPHONE ENCOUNTER
Attempt at reaching Pt to schedule overdue pap by 2yrs.  Mailbox full-unable to leave a message.  Will attempt again in 6mo.  Patricia CASIANO, JUAN C

## 2023-07-20 ENCOUNTER — TELEPHONE (OUTPATIENT)
Dept: SURGERY | Facility: CLINIC | Age: 28
End: 2023-07-20
Payer: COMMERCIAL

## 2023-07-24 NOTE — TELEPHONE ENCOUNTER
Monica called me and she's now scheduled for surgery on 08.04.23 with Dr. Alvarez. We went over details and I let her know I will send a confirmation letter to her MyChart. She's in agreement with the plan.

## 2023-08-02 ENCOUNTER — OFFICE VISIT (OUTPATIENT)
Dept: FAMILY MEDICINE | Facility: CLINIC | Age: 28
End: 2023-08-02
Payer: COMMERCIAL

## 2023-08-02 VITALS
HEIGHT: 67 IN | RESPIRATION RATE: 20 BRPM | BODY MASS INDEX: 35.91 KG/M2 | OXYGEN SATURATION: 97 % | TEMPERATURE: 98 F | DIASTOLIC BLOOD PRESSURE: 74 MMHG | HEART RATE: 77 BPM | SYSTOLIC BLOOD PRESSURE: 106 MMHG | WEIGHT: 228.8 LBS

## 2023-08-02 DIAGNOSIS — Z01.818 PRE-OPERATIVE EXAMINATION: Primary | ICD-10-CM

## 2023-08-02 DIAGNOSIS — F17.200 NICOTINE DEPENDENCE, UNCOMPLICATED, UNSPECIFIED NICOTINE PRODUCT TYPE: ICD-10-CM

## 2023-08-02 LAB
ALBUMIN SERPL BCG-MCNC: 4.7 G/DL (ref 3.5–5.2)
ALP SERPL-CCNC: 95 U/L (ref 35–104)
ALT SERPL W P-5'-P-CCNC: 14 U/L (ref 0–50)
ANION GAP SERPL CALCULATED.3IONS-SCNC: 12 MMOL/L (ref 7–15)
AST SERPL W P-5'-P-CCNC: 28 U/L (ref 0–45)
BASOPHILS # BLD AUTO: 0 10E3/UL (ref 0–0.2)
BASOPHILS NFR BLD AUTO: 0 %
BILIRUB SERPL-MCNC: 0.4 MG/DL
BUN SERPL-MCNC: 11.9 MG/DL (ref 6–20)
CALCIUM SERPL-MCNC: 9.7 MG/DL (ref 8.6–10)
CHLORIDE SERPL-SCNC: 103 MMOL/L (ref 98–107)
CREAT SERPL-MCNC: 1 MG/DL (ref 0.51–0.95)
DEPRECATED HCO3 PLAS-SCNC: 24 MMOL/L (ref 22–29)
EOSINOPHIL # BLD AUTO: 0.1 10E3/UL (ref 0–0.7)
EOSINOPHIL NFR BLD AUTO: 1 %
ERYTHROCYTE [DISTWIDTH] IN BLOOD BY AUTOMATED COUNT: 12 % (ref 10–15)
GFR SERPL CREATININE-BSD FRML MDRD: 79 ML/MIN/1.73M2
GLUCOSE SERPL-MCNC: 85 MG/DL (ref 70–99)
HCT VFR BLD AUTO: 42.9 % (ref 35–47)
HGB BLD-MCNC: 14.4 G/DL (ref 11.7–15.7)
IMM GRANULOCYTES # BLD: 0 10E3/UL
IMM GRANULOCYTES NFR BLD: 0 %
LYMPHOCYTES # BLD AUTO: 2.2 10E3/UL (ref 0.8–5.3)
LYMPHOCYTES NFR BLD AUTO: 22 %
MCH RBC QN AUTO: 31.1 PG (ref 26.5–33)
MCHC RBC AUTO-ENTMCNC: 33.6 G/DL (ref 31.5–36.5)
MCV RBC AUTO: 93 FL (ref 78–100)
MONOCYTES # BLD AUTO: 0.5 10E3/UL (ref 0–1.3)
MONOCYTES NFR BLD AUTO: 5 %
NEUTROPHILS # BLD AUTO: 7.3 10E3/UL (ref 1.6–8.3)
NEUTROPHILS NFR BLD AUTO: 72 %
PLATELET # BLD AUTO: 286 10E3/UL (ref 150–450)
POTASSIUM SERPL-SCNC: 4.8 MMOL/L (ref 3.4–5.3)
PROT SERPL-MCNC: 7.3 G/DL (ref 6.4–8.3)
RBC # BLD AUTO: 4.63 10E6/UL (ref 3.8–5.2)
SODIUM SERPL-SCNC: 139 MMOL/L (ref 136–145)
WBC # BLD AUTO: 10.2 10E3/UL (ref 4–11)

## 2023-08-02 PROCEDURE — 85025 COMPLETE CBC W/AUTO DIFF WBC: CPT | Performed by: STUDENT IN AN ORGANIZED HEALTH CARE EDUCATION/TRAINING PROGRAM

## 2023-08-02 PROCEDURE — 80053 COMPREHEN METABOLIC PANEL: CPT | Performed by: STUDENT IN AN ORGANIZED HEALTH CARE EDUCATION/TRAINING PROGRAM

## 2023-08-02 PROCEDURE — 36415 COLL VENOUS BLD VENIPUNCTURE: CPT | Performed by: STUDENT IN AN ORGANIZED HEALTH CARE EDUCATION/TRAINING PROGRAM

## 2023-08-02 PROCEDURE — 99214 OFFICE O/P EST MOD 30 MIN: CPT | Performed by: STUDENT IN AN ORGANIZED HEALTH CARE EDUCATION/TRAINING PROGRAM

## 2023-08-02 NOTE — PROGRESS NOTES
[facility]  [address]  [place] [address]  Phone: [phone]  Primary Provider: Dipti Cm  Pre-op Performing Provider: JUVENTINO LOPEZ      PREOPERATIVE EVALUATION:  Today's date: 8/2/2023    Monica Morales is a 28 year old female who presents for a preoperative evaluation.      8/2/2023     2:49 PM   Additional Questions   Roomed by [facility]       Surgical Information:  Surgery/Procedure: THIGH SUBCUTANEOUS MASS EXCISION   Surgery Location: [facility]   Surgeon: Paulette Alvarez DO   Surgery Date: 8/4/23  Time of Surgery: 9:15 AM  Where patient plans to recover: At home with family  Fax number for surgical facility: Note does not need to be faxed, will be available electronically in Epic.    Assessment & Plan     The proposed surgical procedure is considered INTERMEDIATE risk.    (Z01.818) Pre-operative examination  (primary encounter diagnosis)  Comment: Stable. No EKG needed due to no history of cardiac history. Pending completion of labs.   Plan: CBC with platelets and differential,         Comprehensive metabolic panel (BMP + Alb, Alk         Phos, ALT, AST, Total. Bili, TP)        Pending labs      (F17.200) Nicotine dependence, uncomplicated, unspecified nicotine product type  Comment: Chronic, uncontrolled. Discussed risk associated with smoking and vaping and the family history of grandmother dying from lung cancer. Provided [place] information  Plan: [place] Quit Partner Referral, SMOKING CESSATION         COUNSELING 3-10 MIN                                                                                                                  - No identified additional risk factors other than previously addressed    Antiplatelet or Anticoagulation Medication Instructions:   - Patient is on no antiplatelet or anticoagulation medications.    Additional Medication Instructions:   - Beta Blockers: Continue taking on the day of surgery.   - Antiepileptics: Continue  without modification.   - SSRIs, SNRIs, TCAs, Antipsychotics: Continue without modification.     RECOMMENDATION:  APPROVAL GIVEN to proceed with proposed procedure, without further diagnostic evaluation.            Subjective       HPI related to upcoming procedure: THIGH SUBCUTANEOUS MASS EXCISION          7/27/2023     1:58 PM   Preop Questions   1. Have you ever had a heart attack or stroke? No   2. Have you ever had surgery on your heart or blood vessels, such as a stent placement, a coronary artery bypass, or surgery on an artery in your head, neck, heart, or legs? No   3. Do you have chest pain with activity? No   4. Do you have a history of  heart failure? No   5. Do you currently have a cold, bronchitis or symptoms of other infection? No   6. Do you have a cough, shortness of breath, or wheezing? No   7. Do you or anyone in your family have previous history of blood clots? No   8. Do you or does anyone in your family have a serious bleeding problem such as prolonged bleeding following surgeries or cuts? No   9. Have you ever had problems with anemia or been told to take iron pills? No   10. Have you had any abnormal blood loss such as black, tarry or bloody stools, or abnormal vaginal bleeding? No   11. Have you ever had a blood transfusion? No   12. Are you willing to have a blood transfusion if it is medically needed before, during, or after your surgery? Yes   13. Have you or any of your relatives ever had problems with anesthesia? UNKNOWN -    14. Do you have sleep apnea, excessive snoring or daytime drowsiness? No   15. Do you have any artifical heart valves or other implanted medical devices like a pacemaker, defibrillator, or continuous glucose monitor? No   16. Do you have artificial joints? No   17. Are you allergic to latex? YES:    18. Is there any chance that you may be pregnant? No       Health Care Directive:  Patient does not have a Health Care Directive or Living Will: Discussed advance care  planning with patient; information given to patient to review.    Preoperative Review of :   reviewed - no record of controlled substances prescribed.          Review of Systems  CONSTITUTIONAL: NEGATIVE for fever, chills, change in weight  INTEGUMENTARY/SKIN: NEGATIVE for worrisome rashes, moles or lesions  EYES: NEGATIVE for vision changes or irritation  ENT/MOUTH: NEGATIVE for ear, mouth and throat problems  RESP: NEGATIVE for significant cough or SOB  CV: NEGATIVE for chest pain, palpitations or peripheral edema  GI: NEGATIVE for nausea, abdominal pain, heartburn, or change in bowel habits  : NEGATIVE for frequency, dysuria, or hematuria  MUSCULOSKELETAL: NEGATIVE for significant arthralgias or myalgia  NEURO: NEGATIVE for weakness, dizziness or paresthesias  ENDOCRINE: NEGATIVE for temperature intolerance, skin/hair changes  HEME: NEGATIVE for bleeding problems  PSYCHIATRIC: NEGATIVE for changes in mood or affect    Patient Active Problem List    Diagnosis Date Noted    Palpable mass of soft tissue of thigh 07/06/2023     Priority: Medium    Bipolar II disorder (H) 06/23/2023     Priority: Medium    Suicide attempt (H) 07/06/2022     Priority: Medium    Drug overdose, undetermined intent, initial encounter 06/29/2022     Priority: Medium    Morbid obesity (H) 12/16/2020     Priority: Medium    Suicide ideation 06/06/2019     Priority: Medium    Papanicolaou smear of cervix with low grade squamous intraepithelial lesion (LGSIL) 05/16/2019     Priority: Medium     5/10/19 LSIL Pap Plan repeat cytology in one year  03/3/21 ASCUS Pap. Plan Pap in 1 year due 03/3/22.  Results and recommendations released to the pt through mychart and Letter sent.   02/17/22 Reminder Mychart, Letter  3/15/22 Reminder call - LM  4/14/22 Lost to follow-up for pap tracking  6/2/23-Called to schedule pap      Anxiety and depression 05/14/2019     Priority: Medium    Routine general medical examination at a health care facility  05/10/2019     Priority: Medium    Substance abuse (H) 04/16/2019     Priority: Medium    Seasonal allergic rhinitis due to pollen 04/16/2019     Priority: Medium    Allergic rhinitis 03/17/2014     Priority: Medium    Obesity 08/31/2009     Priority: Medium      Past Medical History:   Diagnosis Date    Abnormal Pap smear of cervix 03/03/2021    03/3/21    Allergic rhinitis 3/17/2014    Anxiety     Anxiety and depression 5/14/2019    Bipolar II disorder (H) 6/23/2023    Depression     Drug overdose, undetermined intent, initial encounter 6/29/2022    Morbid obesity (H) 12/16/2020    Obesity 8/31/2009    Papanicolaou smear of cervix with low grade squamous intraepithelial lesion (LGSIL) 5/16/2019    5/10/19 LSIL Pap Plan repeat cytology in one year 03/3/21 ASCUS Pap. Plan Pap in 1 year due 03/3/22.  Results and recommendations released to the pt through [employer] and Letter sent.  02/17/22 Reminder Mychart, Letter 3/15/22 Reminder call -  4/14/22 Lost to follow-up for pap tracking 6/2/23-Called to schedule pap    Polysubstance abuse (H)     Routine general medical examination at a health care facility 5/10/2019    Seasonal allergic rhinitis due to pollen 4/16/2019    Substance abuse (H) 4/16/2019    Suicide attempt (H) 7/6/2022    Suicide ideation 6/6/2019     Past Surgical History:   Procedure Laterality Date    No past surgery       Current Outpatient Medications   Medication Sig Dispense Refill    ARIPiprazole (ABILIFY) 10 MG tablet Take 10 mg by mouth daily      busPIRone (BUSPAR) 10 MG tablet Take 10 mg by mouth 3 times daily      busPIRone (BUSPAR) 15 MG tablet Take 15 mg by mouth 2 times daily as needed for anxiety      cetirizine (ZYRTEC) 10 MG tablet Take 1 tablet (10 mg) by mouth daily 90 tablet 3    fluticasone (FLONASE) 50 MCG/ACT nasal spray Spray 2 sprays into both nostrils daily 16 g 2    hydrOXYzine (VISTARIL) 50 MG capsule Take 50 mg by mouth every 6 hours as needed for anxiety      lamoTRIgine  "(LAMICTAL) 100 MG tablet Take 100 mg by mouth daily      melatonin 3 MG tablet Take 2 tablets (6 mg) by mouth nightly as needed for sleep 90 tablet 3    propranolol (INDERAL) 10 MG tablet Take 1 tablet (10 mg) by mouth 3 times daily 270 tablet 3    traZODone (DESYREL) 50 MG tablet Take 0.5-1 tablets (25-50 mg) by mouth nightly as needed for sleep 10 tablet 0    metFORMIN (GLUCOPHAGE) 500 MG tablet Take 500 mg by mouth 2 times daily (with meals) (Patient not taking: Reported on 8/2/2023)      Multiple Vitamins-Minerals (MULTIVITAMIN ADULTS) TABS Take 1 tablet by mouth daily (Patient not taking: Reported on 8/2/2023) 30 tablet 1       Allergies   Allergen Reactions    Latex     Seasonal Allergies Itching        Social History     Tobacco Use    Smoking status: Every Day     Types: Other    Smokeless tobacco: Never    Tobacco comments:     Vape    Substance Use Topics    Alcohol use: Not Currently     Family History   Problem Relation Age of Onset    Asthma Father     Hypertension Father     Alcohol/Drug Father     Hypertension Paternal Grandmother     Depression Mother     Diabetes Mother      History   Drug Use Unknown         Objective     /74   Pulse 77   Temp 98  F (36.7  C) (Temporal)   Resp 20   Ht 1.702 m (5' 7\")   Wt 103.8 kg (228 lb 12.8 oz)   LMP 06/20/2023 (Exact Date)   SpO2 97%   BMI 35.84 kg/m      Physical Exam    GENERAL APPEARANCE: healthy, alert and no distress     EYES: EOMI, PERRL     HENT: ear canals and TM's normal and nose and mouth without ulcers or lesions     NECK: no adenopathy, no asymmetry, masses, or scars and thyroid normal to palpation     RESP: lungs clear to auscultation - no rales, rhonchi or wheezes     CV: regular rates and rhythm, normal S1 S2, no S3 or S4 and no murmur, click or rub     ABDOMEN:  soft, nontender, no HSM or masses and bowel sounds normal     MS: right thigh: palpable lipoma     SKIN: no suspicious lesions or rashes     NEURO: Normal strength and " tone, sensory exam grossly normal, mentation intact and speech normal     PSYCH: mentation appears normal. and affect normal/bright     LYMPHATICS: No cervical adenopathy    Recent Labs   Lab Test 06/21/23  0916 07/07/22  0753   HGB 13.3 14.0    243    139   POTASSIUM 4.4 4.2   CR 0.89 0.82   A1C 5.3 5.9*        Diagnostics:  Labs pending at this time.  Results will be reviewed when available.   No EKG required, no history of coronary heart disease, significant arrhythmia, peripheral arterial disease or other structural heart disease.    Revised Cardiac Risk Index (RCRI):  The patient has the following serious cardiovascular risks for perioperative complications:   - No serious cardiac risks = 0 points     RCRI Interpretation: 0 points: Class I (very low risk - 0.4% complication rate)         Signed Electronically by: JUVENTINO LOPEZ MD  Copy of this evaluation report is provided to requesting physician.

## 2023-08-02 NOTE — H&P (VIEW-ONLY)
[facility]  [address]  [place] [address]  Phone: [phone]  Primary Provider: Dipti Cm  Pre-op Performing Provider: JUVENTINO LOPEZ      PREOPERATIVE EVALUATION:  Today's date: 8/2/2023    Monica Morales is a 28 year old female who presents for a preoperative evaluation.      8/2/2023     2:49 PM   Additional Questions   Roomed by [facility]       Surgical Information:  Surgery/Procedure: THIGH SUBCUTANEOUS MASS EXCISION   Surgery Location: [facility]   Surgeon: Paulette Alvarez DO   Surgery Date: 8/4/23  Time of Surgery: 9:15 AM  Where patient plans to recover: At home with family  Fax number for surgical facility: Note does not need to be faxed, will be available electronically in Epic.    Assessment & Plan     The proposed surgical procedure is considered INTERMEDIATE risk.    (Z01.818) Pre-operative examination  (primary encounter diagnosis)  Comment: Stable. No EKG needed due to no history of cardiac history. Pending completion of labs.   Plan: CBC with platelets and differential,         Comprehensive metabolic panel (BMP + Alb, Alk         Phos, ALT, AST, Total. Bili, TP)        Pending labs      (F17.200) Nicotine dependence, uncomplicated, unspecified nicotine product type  Comment: Chronic, uncontrolled. Discussed risk associated with smoking and vaping and the family history of grandmother dying from lung cancer. Provided [place] information  Plan: [place] Quit Partner Referral, SMOKING CESSATION         COUNSELING 3-10 MIN                                                                                                                  - No identified additional risk factors other than previously addressed    Antiplatelet or Anticoagulation Medication Instructions:   - Patient is on no antiplatelet or anticoagulation medications.    Additional Medication Instructions:   - Beta Blockers: Continue taking on the day of surgery.   - Antiepileptics: Continue  without modification.   - SSRIs, SNRIs, TCAs, Antipsychotics: Continue without modification.     RECOMMENDATION:  APPROVAL GIVEN to proceed with proposed procedure, without further diagnostic evaluation.            Subjective       HPI related to upcoming procedure: THIGH SUBCUTANEOUS MASS EXCISION          7/27/2023     1:58 PM   Preop Questions   1. Have you ever had a heart attack or stroke? No   2. Have you ever had surgery on your heart or blood vessels, such as a stent placement, a coronary artery bypass, or surgery on an artery in your head, neck, heart, or legs? No   3. Do you have chest pain with activity? No   4. Do you have a history of  heart failure? No   5. Do you currently have a cold, bronchitis or symptoms of other infection? No   6. Do you have a cough, shortness of breath, or wheezing? No   7. Do you or anyone in your family have previous history of blood clots? No   8. Do you or does anyone in your family have a serious bleeding problem such as prolonged bleeding following surgeries or cuts? No   9. Have you ever had problems with anemia or been told to take iron pills? No   10. Have you had any abnormal blood loss such as black, tarry or bloody stools, or abnormal vaginal bleeding? No   11. Have you ever had a blood transfusion? No   12. Are you willing to have a blood transfusion if it is medically needed before, during, or after your surgery? Yes   13. Have you or any of your relatives ever had problems with anesthesia? UNKNOWN -    14. Do you have sleep apnea, excessive snoring or daytime drowsiness? No   15. Do you have any artifical heart valves or other implanted medical devices like a pacemaker, defibrillator, or continuous glucose monitor? No   16. Do you have artificial joints? No   17. Are you allergic to latex? YES:    18. Is there any chance that you may be pregnant? No       Health Care Directive:  Patient does not have a Health Care Directive or Living Will: Discussed advance care  planning with patient; information given to patient to review.    Preoperative Review of :   reviewed - no record of controlled substances prescribed.          Review of Systems  CONSTITUTIONAL: NEGATIVE for fever, chills, change in weight  INTEGUMENTARY/SKIN: NEGATIVE for worrisome rashes, moles or lesions  EYES: NEGATIVE for vision changes or irritation  ENT/MOUTH: NEGATIVE for ear, mouth and throat problems  RESP: NEGATIVE for significant cough or SOB  CV: NEGATIVE for chest pain, palpitations or peripheral edema  GI: NEGATIVE for nausea, abdominal pain, heartburn, or change in bowel habits  : NEGATIVE for frequency, dysuria, or hematuria  MUSCULOSKELETAL: NEGATIVE for significant arthralgias or myalgia  NEURO: NEGATIVE for weakness, dizziness or paresthesias  ENDOCRINE: NEGATIVE for temperature intolerance, skin/hair changes  HEME: NEGATIVE for bleeding problems  PSYCHIATRIC: NEGATIVE for changes in mood or affect    Patient Active Problem List    Diagnosis Date Noted    Palpable mass of soft tissue of thigh 07/06/2023     Priority: Medium    Bipolar II disorder (H) 06/23/2023     Priority: Medium    Suicide attempt (H) 07/06/2022     Priority: Medium    Drug overdose, undetermined intent, initial encounter 06/29/2022     Priority: Medium    Morbid obesity (H) 12/16/2020     Priority: Medium    Suicide ideation 06/06/2019     Priority: Medium    Papanicolaou smear of cervix with low grade squamous intraepithelial lesion (LGSIL) 05/16/2019     Priority: Medium     5/10/19 LSIL Pap Plan repeat cytology in one year  03/3/21 ASCUS Pap. Plan Pap in 1 year due 03/3/22.  Results and recommendations released to the pt through mychart and Letter sent.   02/17/22 Reminder Mychart, Letter  3/15/22 Reminder call - LM  4/14/22 Lost to follow-up for pap tracking  6/2/23-Called to schedule pap      Anxiety and depression 05/14/2019     Priority: Medium    Routine general medical examination at a health care facility  05/10/2019     Priority: Medium    Substance abuse (H) 04/16/2019     Priority: Medium    Seasonal allergic rhinitis due to pollen 04/16/2019     Priority: Medium    Allergic rhinitis 03/17/2014     Priority: Medium    Obesity 08/31/2009     Priority: Medium      Past Medical History:   Diagnosis Date    Abnormal Pap smear of cervix 03/03/2021    03/3/21    Allergic rhinitis 3/17/2014    Anxiety     Anxiety and depression 5/14/2019    Bipolar II disorder (H) 6/23/2023    Depression     Drug overdose, undetermined intent, initial encounter 6/29/2022    Morbid obesity (H) 12/16/2020    Obesity 8/31/2009    Papanicolaou smear of cervix with low grade squamous intraepithelial lesion (LGSIL) 5/16/2019    5/10/19 LSIL Pap Plan repeat cytology in one year 03/3/21 ASCUS Pap. Plan Pap in 1 year due 03/3/22.  Results and recommendations released to the pt through [employer] and Letter sent.  02/17/22 Reminder Mychart, Letter 3/15/22 Reminder call -  4/14/22 Lost to follow-up for pap tracking 6/2/23-Called to schedule pap    Polysubstance abuse (H)     Routine general medical examination at a health care facility 5/10/2019    Seasonal allergic rhinitis due to pollen 4/16/2019    Substance abuse (H) 4/16/2019    Suicide attempt (H) 7/6/2022    Suicide ideation 6/6/2019     Past Surgical History:   Procedure Laterality Date    No past surgery       Current Outpatient Medications   Medication Sig Dispense Refill    ARIPiprazole (ABILIFY) 10 MG tablet Take 10 mg by mouth daily      busPIRone (BUSPAR) 10 MG tablet Take 10 mg by mouth 3 times daily      busPIRone (BUSPAR) 15 MG tablet Take 15 mg by mouth 2 times daily as needed for anxiety      cetirizine (ZYRTEC) 10 MG tablet Take 1 tablet (10 mg) by mouth daily 90 tablet 3    fluticasone (FLONASE) 50 MCG/ACT nasal spray Spray 2 sprays into both nostrils daily 16 g 2    hydrOXYzine (VISTARIL) 50 MG capsule Take 50 mg by mouth every 6 hours as needed for anxiety      lamoTRIgine  "(LAMICTAL) 100 MG tablet Take 100 mg by mouth daily      melatonin 3 MG tablet Take 2 tablets (6 mg) by mouth nightly as needed for sleep 90 tablet 3    propranolol (INDERAL) 10 MG tablet Take 1 tablet (10 mg) by mouth 3 times daily 270 tablet 3    traZODone (DESYREL) 50 MG tablet Take 0.5-1 tablets (25-50 mg) by mouth nightly as needed for sleep 10 tablet 0    metFORMIN (GLUCOPHAGE) 500 MG tablet Take 500 mg by mouth 2 times daily (with meals) (Patient not taking: Reported on 8/2/2023)      Multiple Vitamins-Minerals (MULTIVITAMIN ADULTS) TABS Take 1 tablet by mouth daily (Patient not taking: Reported on 8/2/2023) 30 tablet 1       Allergies   Allergen Reactions    Latex     Seasonal Allergies Itching        Social History     Tobacco Use    Smoking status: Every Day     Types: Other    Smokeless tobacco: Never    Tobacco comments:     Vape    Substance Use Topics    Alcohol use: Not Currently     Family History   Problem Relation Age of Onset    Asthma Father     Hypertension Father     Alcohol/Drug Father     Hypertension Paternal Grandmother     Depression Mother     Diabetes Mother      History   Drug Use Unknown         Objective     /74   Pulse 77   Temp 98  F (36.7  C) (Temporal)   Resp 20   Ht 1.702 m (5' 7\")   Wt 103.8 kg (228 lb 12.8 oz)   LMP 06/20/2023 (Exact Date)   SpO2 97%   BMI 35.84 kg/m      Physical Exam    GENERAL APPEARANCE: healthy, alert and no distress     EYES: EOMI, PERRL     HENT: ear canals and TM's normal and nose and mouth without ulcers or lesions     NECK: no adenopathy, no asymmetry, masses, or scars and thyroid normal to palpation     RESP: lungs clear to auscultation - no rales, rhonchi or wheezes     CV: regular rates and rhythm, normal S1 S2, no S3 or S4 and no murmur, click or rub     ABDOMEN:  soft, nontender, no HSM or masses and bowel sounds normal     MS: right thigh: palpable lipoma     SKIN: no suspicious lesions or rashes     NEURO: Normal strength and " tone, sensory exam grossly normal, mentation intact and speech normal     PSYCH: mentation appears normal. and affect normal/bright     LYMPHATICS: No cervical adenopathy    Recent Labs   Lab Test 06/21/23  0916 07/07/22  0753   HGB 13.3 14.0    243    139   POTASSIUM 4.4 4.2   CR 0.89 0.82   A1C 5.3 5.9*        Diagnostics:  Labs pending at this time.  Results will be reviewed when available.   No EKG required, no history of coronary heart disease, significant arrhythmia, peripheral arterial disease or other structural heart disease.    Revised Cardiac Risk Index (RCRI):  The patient has the following serious cardiovascular risks for perioperative complications:   - No serious cardiac risks = 0 points     RCRI Interpretation: 0 points: Class I (very low risk - 0.4% complication rate)         Signed Electronically by: JUVENTINO LOPEZ MD  Copy of this evaluation report is provided to requesting physician.

## 2023-08-03 ENCOUNTER — ANESTHESIA EVENT (OUTPATIENT)
Dept: SURGERY | Facility: AMBULATORY SURGERY CENTER | Age: 28
End: 2023-08-03
Payer: COMMERCIAL

## 2023-08-04 ENCOUNTER — ANESTHESIA (OUTPATIENT)
Dept: SURGERY | Facility: AMBULATORY SURGERY CENTER | Age: 28
End: 2023-08-04
Payer: COMMERCIAL

## 2023-08-04 ENCOUNTER — HOSPITAL ENCOUNTER (OUTPATIENT)
Facility: AMBULATORY SURGERY CENTER | Age: 28
Discharge: HOME | End: 2023-08-04
Attending: SURGERY
Payer: COMMERCIAL

## 2023-08-04 VITALS
SYSTOLIC BLOOD PRESSURE: 109 MMHG | RESPIRATION RATE: 16 BRPM | TEMPERATURE: 97.7 F | HEART RATE: 74 BPM | WEIGHT: 228 LBS | DIASTOLIC BLOOD PRESSURE: 74 MMHG | HEIGHT: 67 IN | BODY MASS INDEX: 35.79 KG/M2 | OXYGEN SATURATION: 100 %

## 2023-08-04 DIAGNOSIS — M79.89 PALPABLE MASS OF SOFT TISSUE OF THIGH: ICD-10-CM

## 2023-08-04 PROCEDURE — 11606 EXC TR-EXT MAL+MARG >4 CM: CPT | Performed by: SURGERY

## 2023-08-04 RX ORDER — FENTANYL CITRATE 0.05 MG/ML
25 INJECTION, SOLUTION INTRAMUSCULAR; INTRAVENOUS EVERY 5 MIN PRN
Status: DISCONTINUED | OUTPATIENT
Start: 2023-08-04 | End: 2023-08-06 | Stop reason: HOSPADM

## 2023-08-04 RX ORDER — FENTANYL CITRATE 50 UG/ML
INJECTION, SOLUTION INTRAMUSCULAR; INTRAVENOUS PRN
Status: DISCONTINUED | OUTPATIENT
Start: 2023-08-04 | End: 2023-08-04

## 2023-08-04 RX ORDER — FENTANYL CITRATE 0.05 MG/ML
50 INJECTION, SOLUTION INTRAMUSCULAR; INTRAVENOUS EVERY 5 MIN PRN
Status: DISCONTINUED | OUTPATIENT
Start: 2023-08-04 | End: 2023-08-06 | Stop reason: HOSPADM

## 2023-08-04 RX ORDER — OXYCODONE HYDROCHLORIDE 10 MG/1
10 TABLET ORAL
Status: DISCONTINUED | OUTPATIENT
Start: 2023-08-04 | End: 2023-08-06 | Stop reason: HOSPADM

## 2023-08-04 RX ORDER — ONDANSETRON 2 MG/ML
INJECTION INTRAMUSCULAR; INTRAVENOUS PRN
Status: DISCONTINUED | OUTPATIENT
Start: 2023-08-04 | End: 2023-08-04

## 2023-08-04 RX ORDER — HYDROMORPHONE HCL IN WATER/PF 6 MG/30 ML
0.4 PATIENT CONTROLLED ANALGESIA SYRINGE INTRAVENOUS EVERY 5 MIN PRN
Status: DISCONTINUED | OUTPATIENT
Start: 2023-08-04 | End: 2023-08-06 | Stop reason: HOSPADM

## 2023-08-04 RX ORDER — ONDANSETRON 4 MG/1
4 TABLET, ORALLY DISINTEGRATING ORAL EVERY 30 MIN PRN
Status: DISCONTINUED | OUTPATIENT
Start: 2023-08-04 | End: 2023-08-06 | Stop reason: HOSPADM

## 2023-08-04 RX ORDER — PROPOFOL 10 MG/ML
INJECTION, EMULSION INTRAVENOUS PRN
Status: DISCONTINUED | OUTPATIENT
Start: 2023-08-04 | End: 2023-08-04

## 2023-08-04 RX ORDER — LIDOCAINE 40 MG/G
CREAM TOPICAL
Status: DISCONTINUED | OUTPATIENT
Start: 2023-08-04 | End: 2023-08-06 | Stop reason: HOSPADM

## 2023-08-04 RX ORDER — ONDANSETRON 2 MG/ML
4 INJECTION INTRAMUSCULAR; INTRAVENOUS EVERY 30 MIN PRN
Status: DISCONTINUED | OUTPATIENT
Start: 2023-08-04 | End: 2023-08-06 | Stop reason: HOSPADM

## 2023-08-04 RX ORDER — HYDROMORPHONE HCL IN WATER/PF 6 MG/30 ML
0.2 PATIENT CONTROLLED ANALGESIA SYRINGE INTRAVENOUS EVERY 5 MIN PRN
Status: DISCONTINUED | OUTPATIENT
Start: 2023-08-04 | End: 2023-08-06 | Stop reason: HOSPADM

## 2023-08-04 RX ORDER — OXYCODONE HYDROCHLORIDE 5 MG/1
5 TABLET ORAL
Status: DISCONTINUED | OUTPATIENT
Start: 2023-08-04 | End: 2023-08-06 | Stop reason: HOSPADM

## 2023-08-04 RX ORDER — ACETAMINOPHEN 325 MG/1
975 TABLET ORAL ONCE
Status: COMPLETED | OUTPATIENT
Start: 2023-08-04 | End: 2023-08-04

## 2023-08-04 RX ORDER — FENTANYL CITRATE 0.05 MG/ML
25 INJECTION, SOLUTION INTRAMUSCULAR; INTRAVENOUS
Status: DISCONTINUED | OUTPATIENT
Start: 2023-08-04 | End: 2023-08-06 | Stop reason: HOSPADM

## 2023-08-04 RX ORDER — SODIUM CHLORIDE, SODIUM LACTATE, POTASSIUM CHLORIDE, CALCIUM CHLORIDE 600; 310; 30; 20 MG/100ML; MG/100ML; MG/100ML; MG/100ML
INJECTION, SOLUTION INTRAVENOUS CONTINUOUS
Status: DISCONTINUED | OUTPATIENT
Start: 2023-08-04 | End: 2023-08-06 | Stop reason: HOSPADM

## 2023-08-04 RX ORDER — PROPOFOL 10 MG/ML
INJECTION, EMULSION INTRAVENOUS CONTINUOUS PRN
Status: DISCONTINUED | OUTPATIENT
Start: 2023-08-04 | End: 2023-08-04

## 2023-08-04 RX ADMIN — FENTANYL CITRATE 25 MCG: 50 INJECTION, SOLUTION INTRAMUSCULAR; INTRAVENOUS at 09:34

## 2023-08-04 RX ADMIN — PROPOFOL 30 MG: 10 INJECTION, EMULSION INTRAVENOUS at 09:25

## 2023-08-04 RX ADMIN — ACETAMINOPHEN 975 MG: 325 TABLET ORAL at 08:20

## 2023-08-04 RX ADMIN — SODIUM CHLORIDE, SODIUM LACTATE, POTASSIUM CHLORIDE, CALCIUM CHLORIDE: 600; 310; 30; 20 INJECTION, SOLUTION INTRAVENOUS at 08:32

## 2023-08-04 RX ADMIN — PROPOFOL 160 MCG/KG/MIN: 10 INJECTION, EMULSION INTRAVENOUS at 09:24

## 2023-08-04 RX ADMIN — FENTANYL CITRATE 50 MCG: 50 INJECTION, SOLUTION INTRAMUSCULAR; INTRAVENOUS at 09:22

## 2023-08-04 RX ADMIN — FENTANYL CITRATE 25 MCG: 50 INJECTION, SOLUTION INTRAMUSCULAR; INTRAVENOUS at 09:51

## 2023-08-04 RX ADMIN — ONDANSETRON 4 MG: 2 INJECTION INTRAMUSCULAR; INTRAVENOUS at 09:28

## 2023-08-04 ASSESSMENT — LIFESTYLE VARIABLES: TOBACCO_USE: 1

## 2023-08-04 NOTE — ANESTHESIA CARE TRANSFER NOTE
"  Patient: Monica Morales    Procedure: Procedure(s):  THIGH SUBCUTANEOUS MASS EXCISION       Diagnosis: Palpable mass of soft tissue of thigh [M79.89]  Diagnosis Additional Information: No value filed.    Anesthesia Type:   MAC     Note:/66   Pulse 70   Temp 36.5  C (97.7  F) (Temporal)   Resp 16   Ht 1.702 m (5' 7\")   Wt 103.4 kg (228 lb)   LMP 05/29/2023 (Exact Date)   SpO2 96%   BMI 35.71 kg/m        Oropharynx: oropharynx clear of all foreign objects  Level of Consciousness: drowsy  Oxygen Supplementation: face mask  Level of Supplemental Oxygen (L/min / FiO2): 6  Independent Airway: airway patency satisfactory and stable  Dentition: dentition unchanged  Vital Signs Stable: post-procedure vital signs reviewed and stable  Report to RN Given: handoff report given  Patient transferred to: Phase II    Handoff Report: Identifed the Patient, Identified the Reponsible Provider, Reviewed the pertinent medical history, Discussed the surgical course, Reviewed Intra-OP anesthesia mangement and issues during anesthesia, Set expectations for post-procedure period and Allowed opportunity for questions and acknowledgement of understanding      Vitals:  Vitals Value Taken Time   /66 08/04/23 1005   Temp     Pulse 68 08/04/23 1005   Resp     SpO2 98 % 08/04/23 1005   Vitals shown include unvalidated device data.    Electronically Signed By: Anel Carter CRNA, RODNEY HOWELL  August 4, 2023  10:07 AM  "
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
## 2023-08-04 NOTE — DISCHARGE INSTRUCTIONS
If you have any questions or concerns regarding your procedure, please contact Dr. Paulette Alvarez, her office number is [phone].     You have received 975 mg of Acetaminophen (Tylenol) at 8:20am. Please do not take an additional dose of Tylenol until after 2:20pm      Do not exceed 4,000 mg of acetaminophen during a 24 hour period and keep in mind that acetaminophen can also be found in many over-the-counter cold medications as well as narcotics that may be given for pain.

## 2023-08-04 NOTE — PROGRESS NOTES
Patient offered pregnancy test prior to procedure. Patient denies possibility of pregnancy. Patient declines pregnancy test at this time.

## 2023-08-04 NOTE — ANESTHESIA POSTPROCEDURE EVALUATION
Patient: Monica Morales    Procedure: Procedure(s):  THIGH SUBCUTANEOUS MASS EXCISION       Anesthesia Type:  MAC    Note:  Disposition: Outpatient   Postop Pain Control: Uneventful            Sign Out: Well controlled pain   PONV: No   Neuro/Psych: Uneventful            Sign Out: Acceptable/Baseline neuro status   Airway/Respiratory: Uneventful            Sign Out: Acceptable/Baseline resp. status   CV/Hemodynamics: Uneventful            Sign Out: Acceptable CV status; No obvious hypovolemia; No obvious fluid overload   Other NRE: NONE   DID A NON-ROUTINE EVENT OCCUR? No           Last vitals:  Vitals Value Taken Time   /74 08/04/23 1030   Temp     Pulse 74 08/04/23 1040   Resp 16 08/04/23 1040   SpO2 100 % 08/04/23 1040       Electronically Signed By: Fabián Garcia MD  August 4, 2023  12:03 PM

## 2023-08-04 NOTE — ANESTHESIA PREPROCEDURE EVALUATION
Anesthesia Pre-Procedure Evaluation    Patient: Monica Morales   MRN: [id number] : 1995        Procedure : Procedure(s):  THIGH SUBCUTANEOUS MASS EXCISION          Past Medical History:   Diagnosis Date    Abnormal Pap smear of cervix 2021    03/3/21    Allergic rhinitis 2014    Anxiety     Anxiety and depression 2019    Bipolar II disorder (H) 2023    Depression     Drug overdose, undetermined intent, initial encounter 2022    Morbid obesity (H) 2020    Obesity 2009    Papanicolaou smear of cervix with low grade squamous intraepithelial lesion (LGSIL) 2019    5/10/19 LSIL Pap Plan repeat cytology in one year 03/3/21 ASCUS Pap. Plan Pap in 1 year due 03/3/22.  Results and recommendations released to the pt through mychart and Letter sent.  22 Reminder Mychart, Letter 3/15/22 Reminder call - LM 22 Lost to follow-up for pap tracking 23-Called to schedule pap    Polysubstance abuse (H)     Routine general medical examination at a health care facility 05/10/2019    Seasonal allergic rhinitis due to pollen 2019    Seizures (H)     Situational once    Substance abuse (H) 2019    Suicide attempt (H) 2022    Suicide ideation 2019      Past Surgical History:   Procedure Laterality Date    No past surgery        Allergies   Allergen Reactions    Latex     Seasonal Allergies Itching      Social History     Tobacco Use    Smoking status: Every Day     Types: Other    Smokeless tobacco: Never    Tobacco comments:     Vape    Substance Use Topics    Alcohol use: Not Currently      Wt Readings from Last 1 Encounters:   23 103.4 kg (228 lb)        Anesthesia Evaluation            ROS/MED HX  ENT/Pulmonary:  - neg pulmonary ROS   (+)           allergic rhinitis,     tobacco use (vape), Current use,                      Neurologic:  - neg neurologic ROS     Cardiovascular:  - neg cardiovascular ROS     METS/Exercise Tolerance: >4  METS    Hematologic:  - neg hematologic  ROS     Musculoskeletal:  - neg musculoskeletal ROS     GI/Hepatic:  - neg GI/hepatic ROS     Renal/Genitourinary:  - neg Renal ROS     Endo:  - neg endo ROS   (+)               Obesity (bmi 35),       Psychiatric/Substance Use: Comment: H/o polysubstance abuse - currently sober - neg psychiatric ROS   (+) psychiatric history 1, anxiety, depression and bipolar       Infectious Disease:  - neg infectious disease ROS     Malignancy:  - neg malignancy ROS     Other:  - neg other ROS          Physical Exam    Airway        Mallampati: II   TM distance: > 3 FB   Neck ROM: full   Mouth opening: > 3 cm    Respiratory Devices and Support         Dental       (+) Completely normal teeth      Cardiovascular   cardiovascular exam normal          Pulmonary   pulmonary exam normal                OUTSIDE LABS:  CBC:   Lab Results   Component Value Date    WBC 10.2 08/02/2023    WBC 9.6 06/21/2023    HGB 14.4 08/02/2023    HGB 13.3 06/21/2023    HCT 42.9 08/02/2023    HCT 40.1 06/21/2023     08/02/2023     06/21/2023     BMP:   Lab Results   Component Value Date     08/02/2023     06/21/2023    POTASSIUM 4.8 08/02/2023    POTASSIUM 4.4 06/21/2023    CHLORIDE 103 08/02/2023    CHLORIDE 105 06/21/2023    CO2 24 08/02/2023    CO2 24 06/21/2023    BUN 11.9 08/02/2023    BUN 13.5 06/21/2023    CR 1.00 (H) 08/02/2023    CR 0.89 06/21/2023    GLC 85 08/02/2023    GLC 94 06/21/2023     COAGS: No results found for: PTT, INR, FIBR  POC:   Lab Results   Component Value Date    HCG NEGATIVE 02/03/2021    HCGS Negative 06/29/2022     HEPATIC:   Lab Results   Component Value Date    ALBUMIN 4.7 08/02/2023    PROTTOTAL 7.3 08/02/2023    ALT 14 08/02/2023    AST 28 08/02/2023    ALKPHOS 95 08/02/2023    BILITOTAL 0.4 08/02/2023     OTHER:   Lab Results   Component Value Date    LACT 1.3 07/01/2022    A1C 5.3 06/21/2023    SOO 9.7 08/02/2023    TSH 1.18 06/07/2019       Anesthesia  Plan    ASA Status:  3    NPO Status:  NPO Appropriate    Anesthesia Type: MAC.     - Reason for MAC: immobility needed, straight local not clinically adequate   Induction: Propofol.   Maintenance: TIVA.        Consents    Anesthesia Plan(s) and associated risks, benefits, and realistic alternatives discussed. Questions answered and patient/representative(s) expressed understanding.     - Discussed:     - Discussed with:  Patient            Postoperative Care    Pain management: Multi-modal analgesia.   PONV prophylaxis: Ondansetron (or other 5HT-3), Dexamethasone or Solumedrol     Comments:    Other Comments: Toradol if OK with surgeon    Reviewed anesthetic options and risks. Patient agrees to proceed.             Fabián Garcia MD

## 2023-08-04 NOTE — INTERVAL H&P NOTE
Patient seen in preop.  Denies new medical history since she was last seen.  All questions answered regarding procedure.  Right thigh mass marked.  Consent obtained.  To the OR for right thigh subcutaneous mass excision.    Paulette Alvarez DO  General Surgeon  [facility]  Surgery [facility] - [address]  [place] [address]  Office: [phone]  Employed by - [facility]

## 2023-08-17 ENCOUNTER — HOSPITAL ENCOUNTER (EMERGENCY)
Facility: CLINIC | Age: 28
Discharge: HOME | End: 2023-08-17
Attending: EMERGENCY MEDICINE | Admitting: EMERGENCY MEDICINE
Payer: COMMERCIAL

## 2023-08-17 ENCOUNTER — APPOINTMENT (OUTPATIENT)
Dept: ULTRASOUND IMAGING | Facility: CLINIC | Age: 28
End: 2023-08-17
Attending: EMERGENCY MEDICINE
Payer: COMMERCIAL

## 2023-08-17 VITALS
DIASTOLIC BLOOD PRESSURE: 78 MMHG | OXYGEN SATURATION: 100 % | HEART RATE: 87 BPM | SYSTOLIC BLOOD PRESSURE: 114 MMHG | HEIGHT: 67 IN | TEMPERATURE: 97.8 F | BODY MASS INDEX: 36.26 KG/M2 | RESPIRATION RATE: 18 BRPM | WEIGHT: 231 LBS

## 2023-08-17 DIAGNOSIS — T88.8XXA FLUID COLLECTION AT SURGICAL SITE, INITIAL ENCOUNTER: ICD-10-CM

## 2023-08-17 DIAGNOSIS — M79.89 SWELLING OF LIMB: Primary | ICD-10-CM

## 2023-08-17 PROCEDURE — 76882 US LMTD JT/FCL EVL NVASC XTR: CPT | Mod: RT

## 2023-08-17 PROCEDURE — 99284 EMERGENCY DEPT VISIT MOD MDM: CPT | Mod: 25

## 2023-08-17 PROCEDURE — 250N000013 HC RX MED GY IP 250 OP 250 PS 637: Performed by: EMERGENCY MEDICINE

## 2023-08-17 PROCEDURE — 99284 EMERGENCY DEPT VISIT MOD MDM: CPT | Performed by: EMERGENCY MEDICINE

## 2023-08-17 RX ORDER — OXYCODONE HYDROCHLORIDE 5 MG/1
5 TABLET ORAL ONCE
Status: COMPLETED | OUTPATIENT
Start: 2023-08-17 | End: 2023-08-17

## 2023-08-17 RX ORDER — OXYCODONE HYDROCHLORIDE 5 MG/1
5 TABLET ORAL EVERY 6 HOURS PRN
Qty: 12 TABLET | Refills: 0 | Status: SHIPPED | OUTPATIENT
Start: 2023-08-17 | End: 2023-08-23

## 2023-08-17 RX ADMIN — OXYCODONE HYDROCHLORIDE 5 MG: 5 TABLET ORAL at 18:50

## 2023-08-17 ASSESSMENT — ACTIVITIES OF DAILY LIVING (ADL)
ADLS_ACUITY_SCORE: 35
ADLS_ACUITY_SCORE: 35

## 2023-08-17 NOTE — ED TRIAGE NOTES
Patient had lipoma removed 2 weeks ago -site is now bigger than it was before, painful, hard.      Triage Assessment       Row Name 08/17/23 6131       Triage Assessment (Adult)    Airway WDL WDL       Respiratory WDL    Respiratory WDL WDL       Skin Circulation/Temperature WDL    Skin Circulation/Temperature WDL X       Cardiac WDL    Cardiac WDL WDL       Peripheral/Neurovascular WDL    Peripheral Neurovascular WDL WDL       Cognitive/Neuro/Behavioral WDL    Cognitive/Neuro/Behavioral WDL WDL
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
## 2023-08-18 NOTE — DISCHARGE INSTRUCTIONS
You have been seen for a fluid collection at your surgical site.  This is possibly a hematoma or a seroma.  This is something that your surgeon needs to address.  We would not drain this in the emergency department as you just had surgery on this site.  Please call your surgery clinic tomorrow and let them know that you were seen in the ER for this problem and they should be able to make a quick appointment for you to be seen by somebody in the clinic.    We have given you a prescription for pain medications to take as needed.  Do not drink alcohol or drive a vehicle while taking this medication as it can be sedating.

## 2023-08-23 ENCOUNTER — OFFICE VISIT (OUTPATIENT)
Dept: SURGERY | Facility: CLINIC | Age: 28
End: 2023-08-23
Payer: COMMERCIAL

## 2023-08-23 DIAGNOSIS — L76.31 POSTOPERATIVE HEMATOMA OF SKIN FOLLOWING DERMATOLOGIC PROCEDURE: Primary | ICD-10-CM

## 2023-08-23 PROCEDURE — 99212 OFFICE O/P EST SF 10 MIN: CPT | Performed by: PHYSICIAN ASSISTANT

## 2023-08-23 NOTE — LETTER
"    8/23/2023         RE: Monica Morales  [address]  [place] [address]        Dear Colleague,    Thank you for referring your patient, Monica Morales, to the [facility]ATRICS CARE [place]. Please see a copy of my visit note below.    HPI: Pt is here for follow up of a removal on her right upper thigh by Dr. Alvarez on 8/4.  She is noted a swelling that has been sore.  No increase in redness or fevers.      LMP 06/20/2023 (Exact Date)     EXAM:  GENERAL:Appears well    SURGICAL WOUNDS:  Incisions healing well, no enduration or drainage.  Palpable fluctuation consistent with seroma.  Reviewed ultrasound which showed a complex either hematoma or seroma.  With patient's permission I cleaned a small area with Betadine and then anesthetized with mL of 1% lidocaine.  I then placed an 18-gauge needle with syringe and tried to drain without any success.  Most likely this is a hematoma versus a seroma.  Patient also was not tolerating it well stating that it just made her feel uncomfortable.    MICROSCOPIC AND DIAGNOSIS:   RIGHT THIGH, \"MASS\", EXCISION:        - BENIGN CYST BORDERED BY THICK FIBROTIC WALL AND ADIPOSE TISSUE   WITH         FOCAL HYALINE CHANGES AND HISTIOCYTIC INFILTRATE, FOCALLY   CONTAINING         FIBRIN MATERIAL AND DEGENERATED BLOOD CELLS        - NO LINING EPITHELIUM IDENTIFIED, NEGATIVE FOR NEOPLASTIC PROCESS   Assessment/Plan: .  Seroma/hematoma postop lipoma removal right upper leg.  I did place a pressure bandage of Coban around leg to see if this will help a little bit.  I did encourage the patient that this could take a while for your body to reabsorb the fluid.  If she notices any increase in redness or pain to come back.  If this is a hematoma sometimes it will liquefy and then will be easier to drain.    Mar Pierce MPA-C        Again, thank you for allowing me to participate in the care of your patient.        Sincerely,        Mar Pierce, " MALICK

## 2023-08-24 NOTE — PROGRESS NOTES
"HPI: Pt is here for follow up of a removal on her right upper thigh by Dr. Alvarez on 8/4.  She is noted a swelling that has been sore.  No increase in redness or fevers.      LMP 06/20/2023 (Exact Date)     EXAM:  GENERAL:Appears well    SURGICAL WOUNDS:  Incisions healing well, no enduration or drainage.  Palpable fluctuation consistent with seroma.  Reviewed ultrasound which showed a complex either hematoma or seroma.  With patient's permission I cleaned a small area with Betadine and then anesthetized with mL of 1% lidocaine.  I then placed an 18-gauge needle with syringe and tried to drain without any success.  Most likely this is a hematoma versus a seroma.  Patient also was not tolerating it well stating that it just made her feel uncomfortable.    MICROSCOPIC AND DIAGNOSIS:   RIGHT THIGH, \"MASS\", EXCISION:        - BENIGN CYST BORDERED BY THICK FIBROTIC WALL AND ADIPOSE TISSUE   WITH         FOCAL HYALINE CHANGES AND HISTIOCYTIC INFILTRATE, FOCALLY   CONTAINING         FIBRIN MATERIAL AND DEGENERATED BLOOD CELLS        - NO LINING EPITHELIUM IDENTIFIED, NEGATIVE FOR NEOPLASTIC PROCESS   Assessment/Plan: .  Seroma/hematoma postop lipoma removal right upper leg.  I did place a pressure bandage of Coban around leg to see if this will help a little bit.  I did encourage the patient that this could take a while for your body to reabsorb the fluid.  If she notices any increase in redness or pain to come back.  If this is a hematoma sometimes it will liquefy and then will be easier to drain.    Mar Pierce MPA-C        "

## 2024-01-05 ENCOUNTER — TELEPHONE (OUTPATIENT)
Dept: FAMILY MEDICINE | Facility: CLINIC | Age: 29
End: 2024-01-05
Payer: COMMERCIAL

## 2024-01-05 NOTE — TELEPHONE ENCOUNTER
LVM regarding scheduling overdue pap smear.  Asked to call back directly or the FD to alert if completed elsewhere and then we can remove from calling list.    Will try back in 6mo.    Patricia STEVENS, EMERYM, OB CC

## 2024-05-23 ENCOUNTER — PATIENT OUTREACH (OUTPATIENT)
Dept: CARE COORDINATION | Facility: CLINIC | Age: 29
End: 2024-05-23
Payer: COMMERCIAL

## 2024-06-06 ENCOUNTER — PATIENT OUTREACH (OUTPATIENT)
Dept: CARE COORDINATION | Facility: CLINIC | Age: 29
End: 2024-06-06
Payer: COMMERCIAL

## 2024-07-09 ENCOUNTER — TELEPHONE (OUTPATIENT)
Dept: FAMILY MEDICINE | Facility: CLINIC | Age: 29
End: 2024-07-09
Payer: COMMERCIAL

## 2024-07-09 NOTE — TELEPHONE ENCOUNTER
Attempt at reaching Monica for cervical cancer screening apt.  Mychart sent.  Will follow up end of the week.  Patricia STEVENS CNM, OB/Reproductive Health CC

## 2024-07-12 ENCOUNTER — TELEPHONE (OUTPATIENT)
Dept: FAMILY MEDICINE | Facility: CLINIC | Age: 29
End: 2024-07-12
Payer: COMMERCIAL

## 2024-07-12 DIAGNOSIS — R87.612 PAPANICOLAOU SMEAR OF CERVIX WITH LOW GRADE SQUAMOUS INTRAEPITHELIAL LESION (LGSIL): Primary | ICD-10-CM

## 2024-07-12 NOTE — TELEPHONE ENCOUNTER
LVM for Monica regarding scheduling overdue apt (cervical cancer screening).   Asked to call back directly.  MyChart and letter sent.  Next reminder 6mo.  Patricia STEVENS, EMERYM, OB/Reproductive Health CC

## 2024-07-12 NOTE — LETTER
July 12, 2024      Monica Morales  [address] [facility] [address]    Dear MsChuyCarmen,      At [facility], your health and wellness is our primary concern. That is why we are following up on an abnormal pap from 2021, which was reported as ASCUS. Your provider had recommended that you have a Pap smear completed by 2022. Our records do not show that this has been done.    It is important to complete the follow up that your provider has suggested for you to ensure that there are no worsening changes which may, over time, develop into cancer.      Please contact our office at  [phone] to schedule an appointment for a Pap smear at your earliest convenience. If you have questions or concerns, please call the clinic and we will be happy to assist you.    If you have completed the tests outside of [facility], please have the results forwarded to our office. We will update the chart for your primary Physician to review before your next annual physical.     Thank you for choosing [facility]!    Sincerely,      Your [facility] Care Team

## 2024-07-17 ENCOUNTER — TELEPHONE (OUTPATIENT)
Dept: BEHAVIORAL HEALTH | Facility: CLINIC | Age: 29
End: 2024-07-17

## 2024-07-17 ENCOUNTER — HOSPITAL ENCOUNTER (EMERGENCY)
Facility: CLINIC | Age: 29
Discharge: HOME | End: 2024-07-21
Attending: EMERGENCY MEDICINE | Admitting: EMERGENCY MEDICINE
Payer: COMMERCIAL

## 2024-07-17 DIAGNOSIS — F32.A ANXIETY AND DEPRESSION: ICD-10-CM

## 2024-07-17 DIAGNOSIS — R45.851 VERBALIZES SUICIDAL THOUGHTS: ICD-10-CM

## 2024-07-17 DIAGNOSIS — F41.9 ANXIETY AND DEPRESSION: ICD-10-CM

## 2024-07-17 PROBLEM — F43.10 PTSD (POST-TRAUMATIC STRESS DISORDER): Status: ACTIVE | Noted: 2024-07-17

## 2024-07-17 PROBLEM — F41.1 GAD (GENERALIZED ANXIETY DISORDER): Status: ACTIVE | Noted: 2024-07-17

## 2024-07-17 PROBLEM — F31.5 BIPOLAR DISORDER, CURRENT EPISODE DEPRESSED, SEVERE, WITH PSYCHOTIC FEATURES (H): Status: ACTIVE | Noted: 2024-07-17

## 2024-07-17 LAB
ALBUMIN SERPL BCG-MCNC: 4.5 G/DL (ref 3.5–5.2)
ALP SERPL-CCNC: 108 U/L (ref 40–150)
ALT SERPL W P-5'-P-CCNC: 14 U/L (ref 0–50)
ANION GAP SERPL CALCULATED.3IONS-SCNC: 12 MMOL/L (ref 7–15)
APAP SERPL-MCNC: <5 UG/ML (ref 10–30)
AST SERPL W P-5'-P-CCNC: 28 U/L (ref 0–45)
BASOPHILS # BLD AUTO: 0 10E3/UL (ref 0–0.2)
BASOPHILS NFR BLD AUTO: 0 %
BILIRUB DIRECT SERPL-MCNC: <0.2 MG/DL (ref 0–0.3)
BILIRUB SERPL-MCNC: 0.6 MG/DL
BUN SERPL-MCNC: 10.1 MG/DL (ref 6–20)
CALCIUM SERPL-MCNC: 9.5 MG/DL (ref 8.8–10.4)
CHLORIDE SERPL-SCNC: 106 MMOL/L (ref 98–107)
CREAT SERPL-MCNC: 0.87 MG/DL (ref 0.51–0.95)
EGFRCR SERPLBLD CKD-EPI 2021: >90 ML/MIN/1.73M2
EOSINOPHIL # BLD AUTO: 0.1 10E3/UL (ref 0–0.7)
EOSINOPHIL NFR BLD AUTO: 1 %
ERYTHROCYTE [DISTWIDTH] IN BLOOD BY AUTOMATED COUNT: 12.6 % (ref 10–15)
ETHANOL SERPL-MCNC: <0.01 G/DL
GLUCOSE SERPL-MCNC: 80 MG/DL (ref 70–99)
HCG SERPL QL: NEGATIVE
HCO3 SERPL-SCNC: 25 MMOL/L (ref 22–29)
HCT VFR BLD AUTO: 40.8 % (ref 35–47)
HGB BLD-MCNC: 13.8 G/DL (ref 11.7–15.7)
HOLD SPECIMEN: NORMAL
IMM GRANULOCYTES # BLD: 0.1 10E3/UL
IMM GRANULOCYTES NFR BLD: 1 %
INR PPP: 1.02 (ref 0.85–1.15)
LYMPHOCYTES # BLD AUTO: 2.9 10E3/UL (ref 0.8–5.3)
LYMPHOCYTES NFR BLD AUTO: 27 %
MCH RBC QN AUTO: 29.7 PG (ref 26.5–33)
MCHC RBC AUTO-ENTMCNC: 33.8 G/DL (ref 31.5–36.5)
MCV RBC AUTO: 88 FL (ref 78–100)
MONOCYTES # BLD AUTO: 0.4 10E3/UL (ref 0–1.3)
MONOCYTES NFR BLD AUTO: 4 %
NEUTROPHILS # BLD AUTO: 7.4 10E3/UL (ref 1.6–8.3)
NEUTROPHILS NFR BLD AUTO: 68 %
NRBC # BLD AUTO: 0 10E3/UL
NRBC BLD AUTO-RTO: 0 /100
PLATELET # BLD AUTO: 303 10E3/UL (ref 150–450)
POTASSIUM SERPL-SCNC: 3.7 MMOL/L (ref 3.4–5.3)
PROT SERPL-MCNC: 7.6 G/DL (ref 6.4–8.3)
RBC # BLD AUTO: 4.64 10E6/UL (ref 3.8–5.2)
SALICYLATES SERPL-MCNC: <0.3 MG/DL
SODIUM SERPL-SCNC: 143 MMOL/L (ref 135–145)
WBC # BLD AUTO: 10.9 10E3/UL (ref 4–11)

## 2024-07-17 PROCEDURE — 99285 EMERGENCY DEPT VISIT HI MDM: CPT

## 2024-07-17 PROCEDURE — 85025 COMPLETE CBC W/AUTO DIFF WBC: CPT | Performed by: EMERGENCY MEDICINE

## 2024-07-17 PROCEDURE — 85610 PROTHROMBIN TIME: CPT | Performed by: EMERGENCY MEDICINE

## 2024-07-17 PROCEDURE — 84703 CHORIONIC GONADOTROPIN ASSAY: CPT | Performed by: EMERGENCY MEDICINE

## 2024-07-17 PROCEDURE — 80143 DRUG ASSAY ACETAMINOPHEN: CPT | Performed by: EMERGENCY MEDICINE

## 2024-07-17 PROCEDURE — 80179 DRUG ASSAY SALICYLATE: CPT | Performed by: EMERGENCY MEDICINE

## 2024-07-17 PROCEDURE — 36415 COLL VENOUS BLD VENIPUNCTURE: CPT | Performed by: EMERGENCY MEDICINE

## 2024-07-17 PROCEDURE — 80053 COMPREHEN METABOLIC PANEL: CPT | Performed by: EMERGENCY MEDICINE

## 2024-07-17 PROCEDURE — 250N000013 HC RX MED GY IP 250 OP 250 PS 637: Performed by: EMERGENCY MEDICINE

## 2024-07-17 PROCEDURE — 82077 ASSAY SPEC XCP UR&BREATH IA: CPT | Performed by: EMERGENCY MEDICINE

## 2024-07-17 RX ORDER — QUETIAPINE FUMARATE 50 MG/1
100 TABLET, FILM COATED ORAL 3 TIMES DAILY PRN
Status: DISCONTINUED | OUTPATIENT
Start: 2024-07-17 | End: 2024-07-21 | Stop reason: HOSPADM

## 2024-07-17 RX ORDER — IBUPROFEN 600 MG/1
600 TABLET, FILM COATED ORAL EVERY 6 HOURS PRN
Status: DISCONTINUED | OUTPATIENT
Start: 2024-07-17 | End: 2024-07-21 | Stop reason: HOSPADM

## 2024-07-17 RX ORDER — TRAZODONE HYDROCHLORIDE 50 MG/1
50 TABLET, FILM COATED ORAL AT BEDTIME
Status: DISCONTINUED | OUTPATIENT
Start: 2024-07-17 | End: 2024-07-17

## 2024-07-17 RX ORDER — HYDROXYZINE HYDROCHLORIDE 25 MG/1
25 TABLET, FILM COATED ORAL EVERY 4 HOURS PRN
Status: DISCONTINUED | OUTPATIENT
Start: 2024-07-17 | End: 2024-07-21 | Stop reason: HOSPADM

## 2024-07-17 RX ORDER — LAMOTRIGINE 25 MG/1
25 TABLET ORAL ONCE
Status: COMPLETED | OUTPATIENT
Start: 2024-07-17 | End: 2024-07-17

## 2024-07-17 RX ORDER — ACETAMINOPHEN 325 MG/1
650 TABLET ORAL EVERY 4 HOURS PRN
Status: DISCONTINUED | OUTPATIENT
Start: 2024-07-17 | End: 2024-07-21 | Stop reason: HOSPADM

## 2024-07-17 RX ADMIN — LAMOTRIGINE 25 MG: 25 TABLET ORAL at 21:26

## 2024-07-17 ASSESSMENT — ACTIVITIES OF DAILY LIVING (ADL)
ADLS_ACUITY_SCORE: 37

## 2024-07-17 ASSESSMENT — COLUMBIA-SUICIDE SEVERITY RATING SCALE - C-SSRS
6. HAVE YOU EVER DONE ANYTHING, STARTED TO DO ANYTHING, OR PREPARED TO DO ANYTHING TO END YOUR LIFE?: YES
5. HAVE YOU STARTED TO WORK OUT OR WORKED OUT THE DETAILS OF HOW TO KILL YOURSELF? DO YOU INTEND TO CARRY OUT THIS PLAN?: YES
3. HAVE YOU BEEN THINKING ABOUT HOW YOU MIGHT KILL YOURSELF?: YES
2. HAVE YOU ACTUALLY HAD ANY THOUGHTS OF KILLING YOURSELF IN THE PAST MONTH?: YES
4. HAVE YOU HAD THESE THOUGHTS AND HAD SOME INTENTION OF ACTING ON THEM?: NO
1. IN THE PAST MONTH, HAVE YOU WISHED YOU WERE DEAD OR WISHED YOU COULD GO TO SLEEP AND NOT WAKE UP?: YES

## 2024-07-17 NOTE — ED NOTES
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 States she doesn't trust me and I trigger her. Charge notified.

## 2024-07-17 NOTE — ED NOTES
7/17/2024  Monica Morales 1995     Writer consulted with ED provider, ER Staff,  on this date at  7/17/24:6:30 PM. It was determined that pt would not benefit from assessment at this time due to Pt unable able to participate in assessment  ER staff said Pt was changing and being uncooperative.  ER staff asked this writer to call in about 15 minutes.      ED will call DEC at [phone] when pt is ready and able to participate in assessment.     OR DEC order has been closed at this time.    SNEHAL PierreSW

## 2024-07-17 NOTE — ED NOTES
"When attempting to explain to the PT the process that takes place when Pts come in with thoughts of harming them selves she stated \"I have been through this before. This is not my first rodeo. I don't want to talk anymore I am exhausted and I want to blow my brains out\".   "
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
## 2024-07-17 NOTE — ED NOTES
"PT continues to talk in triage about how much she feels she can't trust others. She states she can't trust men but also feels she can't trust women. PT making statements such as \"I am a liar they say.the blood in my panties wasn't enough proof\".   "

## 2024-07-18 ENCOUNTER — TELEPHONE (OUTPATIENT)
Dept: BEHAVIORAL HEALTH | Facility: CLINIC | Age: 29
End: 2024-07-18
Payer: COMMERCIAL

## 2024-07-18 LAB
AMPHETAMINES UR QL SCN: ABNORMAL
BARBITURATES UR QL SCN: ABNORMAL
BENZODIAZ UR QL SCN: ABNORMAL
BZE UR QL SCN: ABNORMAL
CANNABINOIDS UR QL SCN: ABNORMAL
FENTANYL UR QL: ABNORMAL
FLUAV RNA SPEC QL NAA+PROBE: NEGATIVE
FLUBV RNA RESP QL NAA+PROBE: NEGATIVE
OPIATES UR QL SCN: ABNORMAL
PCP QUAL URINE (ROCHE): ABNORMAL
RSV RNA SPEC NAA+PROBE: NEGATIVE
SARS-COV-2 RNA RESP QL NAA+PROBE: NEGATIVE

## 2024-07-18 PROCEDURE — 96372 THER/PROPH/DIAG INJ SC/IM: CPT | Performed by: STUDENT IN AN ORGANIZED HEALTH CARE EDUCATION/TRAINING PROGRAM

## 2024-07-18 PROCEDURE — 87637 SARSCOV2&INF A&B&RSV AMP PRB: CPT | Performed by: EMERGENCY MEDICINE

## 2024-07-18 PROCEDURE — 250N000013 HC RX MED GY IP 250 OP 250 PS 637: Performed by: REGISTERED NURSE

## 2024-07-18 PROCEDURE — 99245 OFF/OP CONSLTJ NEW/EST HI 55: CPT | Mod: 95 | Performed by: REGISTERED NURSE

## 2024-07-18 PROCEDURE — 80307 DRUG TEST PRSMV CHEM ANLYZR: CPT | Performed by: EMERGENCY MEDICINE

## 2024-07-18 PROCEDURE — 250N000011 HC RX IP 250 OP 636: Performed by: STUDENT IN AN ORGANIZED HEALTH CARE EDUCATION/TRAINING PROGRAM

## 2024-07-18 RX ORDER — LAMOTRIGINE 25 MG/1
25 TABLET ORAL DAILY
Status: DISCONTINUED | OUTPATIENT
Start: 2024-07-18 | End: 2024-07-21 | Stop reason: HOSPADM

## 2024-07-18 RX ORDER — ALBUTEROL SULFATE 90 UG/1
1-2 AEROSOL, METERED RESPIRATORY (INHALATION) EVERY 6 HOURS PRN
COMMUNITY

## 2024-07-18 RX ORDER — TRAZODONE HYDROCHLORIDE 50 MG/1
50 TABLET, FILM COATED ORAL AT BEDTIME
Status: DISCONTINUED | OUTPATIENT
Start: 2024-07-18 | End: 2024-07-18

## 2024-07-18 RX ORDER — ZIPRASIDONE MESYLATE 20 MG/ML
10 INJECTION, POWDER, LYOPHILIZED, FOR SOLUTION INTRAMUSCULAR ONCE
Status: COMPLETED | OUTPATIENT
Start: 2024-07-18 | End: 2024-07-18

## 2024-07-18 RX ORDER — TRAZODONE HYDROCHLORIDE 50 MG/1
50 TABLET, FILM COATED ORAL AT BEDTIME
Status: DISCONTINUED | OUTPATIENT
Start: 2024-07-18 | End: 2024-07-21 | Stop reason: HOSPADM

## 2024-07-18 RX ORDER — LORAZEPAM 2 MG/ML
2 INJECTION INTRAMUSCULAR ONCE
Status: DISCONTINUED | OUTPATIENT
Start: 2024-07-18 | End: 2024-07-18

## 2024-07-18 RX ORDER — ARIPIPRAZOLE 5 MG/1
10 TABLET ORAL DAILY
Status: DISCONTINUED | OUTPATIENT
Start: 2024-07-18 | End: 2024-07-21 | Stop reason: HOSPADM

## 2024-07-18 RX ORDER — BUSPIRONE HYDROCHLORIDE 5 MG/1
5 TABLET ORAL 3 TIMES DAILY
Status: DISCONTINUED | OUTPATIENT
Start: 2024-07-18 | End: 2024-07-21 | Stop reason: HOSPADM

## 2024-07-18 RX ORDER — PROPRANOLOL HYDROCHLORIDE 10 MG/1
10 TABLET ORAL 3 TIMES DAILY PRN
COMMUNITY

## 2024-07-18 RX ORDER — ZIPRASIDONE MESYLATE 20 MG/ML
20 INJECTION, POWDER, LYOPHILIZED, FOR SOLUTION INTRAMUSCULAR ONCE
Status: DISCONTINUED | OUTPATIENT
Start: 2024-07-18 | End: 2024-07-18

## 2024-07-18 RX ORDER — LORAZEPAM 2 MG/ML
2 INJECTION INTRAMUSCULAR ONCE
Status: COMPLETED | OUTPATIENT
Start: 2024-07-18 | End: 2024-07-18

## 2024-07-18 RX ORDER — PROPRANOLOL HYDROCHLORIDE 10 MG/1
10 TABLET ORAL 3 TIMES DAILY PRN
Status: DISCONTINUED | OUTPATIENT
Start: 2024-07-18 | End: 2024-07-21 | Stop reason: HOSPADM

## 2024-07-18 RX ADMIN — ARIPIPRAZOLE 10 MG: 5 TABLET ORAL at 11:05

## 2024-07-18 RX ADMIN — BUSPIRONE HYDROCHLORIDE 5 MG: 5 TABLET ORAL at 14:59

## 2024-07-18 RX ADMIN — TRAZODONE HYDROCHLORIDE 50 MG: 50 TABLET ORAL at 21:47

## 2024-07-18 RX ADMIN — LORAZEPAM 2 MG: 2 INJECTION INTRAMUSCULAR; INTRAVENOUS at 23:20

## 2024-07-18 RX ADMIN — LAMOTRIGINE 25 MG: 25 TABLET ORAL at 11:05

## 2024-07-18 RX ADMIN — ZIPRASIDONE MESYLATE 10 MG: 20 INJECTION, POWDER, LYOPHILIZED, FOR SOLUTION INTRAMUSCULAR at 23:21

## 2024-07-18 RX ADMIN — BUSPIRONE HYDROCHLORIDE 5 MG: 5 TABLET ORAL at 19:33

## 2024-07-18 ASSESSMENT — ACTIVITIES OF DAILY LIVING (ADL)
ADLS_ACUITY_SCORE: 37

## 2024-07-18 NOTE — ED NOTES
Pt given coloring books and coloring. Very agreeable and at ease. 1:1 at bedside. Pt eyelashes placed in denture cup labeled with patient labels and given to security to put with patient belongings.
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
## 2024-07-18 NOTE — TELEPHONE ENCOUNTER
"R: Per Angela at  at 8:48 am, ([phone]) she needs clarification if there is any substance use and when last use was. She requests more info re: pt's anger in the ED re: note saying pt was uncoop. She asked if any hx of aggression. Author informed her the notes state no hx of aggression. She requested a Covid test be faxed. Faxed Covid result at 8:53 am. Called [facility] ED at 8:59 am who said no current cd issues and no other info on cd use. She said pt did \"fine\" overnight and said at times she can get frustrated but is redirectable. No codes in the ED. Author called Angela at 9:06 am informing her of the above info. She said she needs to know why pt was being uncoop and needs to know what substances she used in the past and when last use was. Per Edie at 9:08 am, pt doesn't want to talk much and wants to be alone and that was why she was uncoop earlier. She said she will ask her about her substance use hx and will call back. Per Edie at 9:19 am, pt declines to go to . She said pt takes medically rx'ed mari and last had cd issues in Dec 2023 but pt wouldn't say which substance she was using then. Called Angela at 9:22 am and cancelled the referral.      [facility] Access Inpatient Bed Call Log 7/18/24 @ 7:20 AM:   (metro):  Intake has called facilities that have not updated the bed status within the last 12 hours.                                 [facility] is posting 0 beds.           [facility] is posting 0 beds. [phone]; Call back at 9:30AM for bed availability.  Per Amy's call at 9:41 am , they are at cap.   [facility] is posting 0 beds. Negative covid required    [facility] is posting 0 beds. Neg covid. No [facility]. [phone].  Per call at 7:20AM, [facility] reports they are at capacity.  Call back at 9:30AM as they will know what discharges they will have for the day.  Per Amy's call at 9:35 am, they are at cap today.     [facility] is posting 0 beds. " "[phone]    [facility] is posting 0 beds. [phone]     [facility] is posting 6 beds. Ages 18-35. Negative covid. [phone]. Per call at 7:18AM-Layla reports they have 2 YA beds avail.  Please see note at top re: pt now declining to go to . See note below also  [facility] is posting 0 beds [phone]        Received message at 12:53 pm from Amy in  stating pt is \"still metro only, clarified w/ pt that she'd like PC added back to the search.  She said she declined earlier because she didn't know about PC, she has since looked it up online and feels it would be appropriate.\"    Called Angela at 1:47 pm at  and explained the above, asking if she will still consider pt. She said she will review her but requested she be placed on a hold. She said she needs to know what substance pt was using in Dec 2023. Called ED RN [facility] at 1:51 pm and she said pt reports she was using over the counter cough medicine, amt and freq unk as pt would not answer more questions. She said she will ask the ED MD to place pt on a hold and will call back re: his response.   Per [facility] at 2 pm, she spoke with the ED MD and he said that if she is accepted at , that he will then put her on a hold but otherwise does not feel it's appropriate to do so.   Called Angela at 2:01 pm and informed her of the above. She said she will call back.       Pt remains on the work list pending appropriate bed availability.             "
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
## 2024-07-18 NOTE — PHARMACY-ADMISSION MEDICATION HISTORY
Pharmacist Admission Medication History    Admission medication history is complete. The information provided in this note is only as accurate as the sources available at the time of the update.    Information Source(s): Patient, [facility] records, and [facility] via in[facility]person    Pertinent Information: Patient was able to confirm current medications and last known administration times. The only medication that is unknown is lamotrigine that was last filled on 4/20/24 with Abilify 10mg tablets. Patient may have ran out of both medications but still states she took the Abilify yesterday 7/17/24 evening.     Changes made to PTA medication list:  Added: None  Deleted: Buspar   Changed: None    Allergies reviewed with patient and updates made in EHR: yes    Medication History Completed By: Jose A Burns [facility] 7/18/2024 10:39 AM    PTA Med List   Medication Sig Last Dose    albuterol (PROAIR HFA/PROVENTIL HFA/VENTOLIN HFA) 108 (90 Base) MCG/ACT inhaler Inhale 1-2 puffs into the lungs every 6 hours as needed for shortness of breath, wheezing or cough PRN    ARIPiprazole (ABILIFY) 10 MG tablet Take 10 mg by mouth daily 7/17/2024 at am    hydrOXYzine (VISTARIL) 50 MG capsule Take 50 mg by mouth every 6 hours as needed for anxiety PRN    lamoTRIgine (LAMICTAL) 100 MG tablet Take 100 mg by mouth daily Unknown    melatonin 3 MG tablet Take 2 tablets (6 mg) by mouth nightly as needed for sleep PRN    propranolol (INDERAL) 10 MG tablet Take 10 mg by mouth 3 times daily as needed (anxiety) PRN    traZODone (DESYREL) 50 MG tablet Take 0.5-1 tablets (25-50 mg) by mouth nightly as needed for sleep PRN
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
## 2024-07-18 NOTE — ED NOTES
AIDET performed and patient updated on plan of care. All needs met at this time. 1:1. At bedside. Pt avoiding eye contact but shaking head yes/no.

## 2024-07-18 NOTE — ED NOTES
AIDET performed. Pt sitting on bed, avoiding eye contact. 1:1 at bedside. Pt encouraged to let hospital staff know of any needs they have.

## 2024-07-18 NOTE — ED NOTES
Per 1:1, pt becoming increasingly more agitated and threw bouncy ball at wall. When RN walked into room, pt laying in bed away from wall.

## 2024-07-18 NOTE — ED NOTES
"Pt denies taking any night time home medications stating \"I don't trust anything from you guys\". Pt also requesting Lamictal. Given with sealed juice per pt request.   " Pt w/ prior hx of nephrolithiasis with recurrent symptoms of L flank/back pain.  CT w/ small non-obstructing stone  Monitor for symptom improvement

## 2024-07-18 NOTE — ED PROVIDER NOTES
[facility] EMERGENCY ROOM   ED Mental Health Observation - Initiation Note    Monica Morales was placed into observation at 8:45 PM on 7/17/2024 for Mental health crisis.   Patient is expected to be under observation status for a minimum of eight hours.    MD Sherman Pal, Karen Vyas MD  07/17/24 2045
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
## 2024-07-18 NOTE — ED NOTES
IP MH Referral Acuity Rating Score (RARS)    LMHP complete at referral to IP MH, with DEC; and, daily while awaiting IP MH placement. Call score to PPS.  CRITERIA SCORING   New 74 HH and Involuntary for IP MH (not adolescent) 0/1   Boarding over 24 hours 0/1   Vulnerable adult at least 55+ with multiple co morbidities; or, Patient age 11 or under 0/1   Suicide ideation without relief of precipitating factors 1/1   Current plan for suicide 1/1   Current plan for homicide 0/1   Imminent risk or actual attempt to seriously harm another without relief of factors precipitating the attempt 0/1   Severe dysfunction in daily living (ex: complete neglect for self care, extreme disruption in vegetative function, extreme deterioration in social interactions) 0/1   Recent (last 2 weeks) or current physical aggression in the ED 0/1   Restraints or seclusion episode in ED 0/1   Verbal aggression, agitation, yelling, etc., while in the ED 1/1   Active psychosis with psychomotor agitation or catatonia 0/1   Need for constant or near constant redirection (from leaving, from others, etc).  0/1   Intrusive or disruptive behaviors 0/1   TOTAL Acuity Total Score: 3

## 2024-07-18 NOTE — TELEPHONE ENCOUNTER
R: [place]  Access Inpatient Bed Call Log 7/18/24 @ 7:20 AM:     Intake has called facilities that have not updated the bed status within the last 12 hours.                                           [facility] is posting 0 beds.                     [facility] is posting 0 beds. [phone]; Call back at 9:30AM for bed availability.              [facility] is posting 0 beds. Negative covid required              [facility] is posting 0 beds. Neg covid. No high school/[facility]-psych. [phone].  Per call at 7:20AM, Marlena reports they are at capacity.  Call back at 9:30AM as they will know what discharges they will have for the day.               [facility] is posting 0 beds. [phone]              [facility] is posting 0 beds. [phone]               [facility] is posting 6 beds. Ages 18-35. Negative covid. [phone]. Per call at 7:18AM-Layla reports they have 1 child bed, 2 YA, 2 Adolescents, No yissel.  No single.               [facility] is posting 0 beds.               [facility] is posting 0 beds [phone]    R: [facility] Access Inpatient Bed Call Log 7/18/24 @ 4:10 PM:      Intake has called facilities that have not updated the bed status within the last 12 hours.                                                    [facility] is posting 0 beds.                             [facility] is posting 0 beds. [phone];  Per call at 4:09 PM, at capacity                    [facility] is posting 0 beds. Negative covid required                      [facility] is posting 0 beds. Neg covid. No high school/Elena-psych. [phone].  per call to [facility] at 4:07 PM, at capacity for the night.                    [facility] is posting 0 beds. [phone]                      [facility] is posting 0 beds. [phone]                       [facility] is posting 1 bed. Young Adult.  Ages 18-35. Negative covid. [phone].                      [facility] is  posting 0 beds.                       [facility] is posting 0 beds [phone]       Pt remains on the work list pending appropriate bed availability.

## 2024-07-18 NOTE — ED PROVIDER NOTES
[facility] EMERGENCY ROOM   ED Mental Health Observation - Discharge Note (Brief)    Monica Morales is boarding in the ED after undergoing evaluation for Mental health crisis  Please see the daily progress note for this patient's presentation, physical examination, and work up.    Upon reevaluation and discussion with DEC , we believe patient has shown insufficient improvement and thus will be Transferred.  Accepted to [facility] by Dr. Ordoñez.  Patient will be placed on hold prior to transfer.    EMERGENCY DEPARTMENT OBSERVATION status ended at 4:53 PM 7/18/2024    Discharge Management Time: > or equal to 30 minutes    1. Verbalizes suicidal thoughts        MD Shantel Mancia Brian T, MD  07/18/24 [id number]
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
## 2024-07-18 NOTE — TELEPHONE ENCOUNTER
R:    4:39p Received call from [facility]/ Angela informing pt is accepted to PC under Provider Abby and Nurse to Nurse [phone]. PC notes d/t staffing issues ED should call for report after 7:30p. Intake notified pt's PPS.     4:46p Called [facility] ED Nurse Candi to inform pt is accepted to PC under Provider Abby and Nurse to Nurse [phone]. PC notes d/t staffing issues ED should call for report after 7:30p. Intake notified pt's PPS.     4:47p Intake notes all work lists updated with pt's acceptance.

## 2024-07-18 NOTE — ED PROVIDER NOTES
"[facility] EMERGENCY ROOM   ED Mental Health Observation - Daily Note for 7/18/2024    Monica Morales is a 29 year old female currently boarding in the ED while awaiting placement for Mental health crisis.  Please see the initial H&P for this patient's presentation, workup, and disposition plan.     Hold Status:  Patient is Voluntary, but holdable    Plan:  In brief, the patient's presentation is notable for PTSD, presenting to emergency department with suicidal ideation and a plan.  Patient consider voluntary but holdable..   Patient is awaiting Mental health placement    Interim History:  There were no significant events since last note.    Physical Exam:  /78   Pulse 61   Temp 98.7  F (37.1  C) (Oral)   Resp 16   Ht 1.676 m (5' 6\")   Wt 117.9 kg (260 lb)   LMP 07/14/2024   SpO2 96%   BMI 41.97 kg/m    No respiratory distress, on room air   Well perfused  Behavior appropriate    Medications provided prior to my care:  Medications   acetaminophen (TYLENOL) tablet 650 mg (has no administration in time range)   ibuprofen (ADVIL/MOTRIN) tablet 600 mg (has no administration in time range)   hydrOXYzine HCl (ATARAX) tablet 25 mg (has no administration in time range)   QUEtiapine (SEROquel) tablet 100 mg (has no administration in time range)   melatonin tablet 6 mg (has no administration in time range)   lamoTRIgine (LaMICtal) tablet 25 mg (25 mg Oral $Given 7/17/24 2120)       Laboratory (reviewed and interpreted):  Labs Ordered and Resulted from Time of ED Arrival to Time of ED Departure   ACETAMINOPHEN LEVEL - Abnormal       Result Value    Acetaminophen <5.0 (*)    URINE DRUG SCREEN PANEL - Abnormal    Amphetamines Urine Screen Negative      Barbituates Urine Screen Negative      Benzodiazepine Urine Screen Negative      Cannabinoids Urine Screen Positive (*)     Cocaine Urine Screen Negative      Fentanyl Qual Urine Screen Negative      Opiates Urine Screen Negative   "    PCP Urine Screen Negative     BASIC METABOLIC PANEL - Normal    Sodium 143      Potassium 3.7      Chloride 106      Carbon Dioxide (CO2) 25      Anion Gap 12      Urea Nitrogen 10.1      Creatinine 0.87      GFR Estimate >90      Calcium 9.5      Glucose 80     HEPATIC FUNCTION PANEL - Normal    Protein Total 7.6      Albumin 4.5      Bilirubin Total 0.6      Alkaline Phosphatase 108      AST 28      ALT 14      Bilirubin Direct <0.20     HCG QUALITATIVE PREGNANCY - Normal    hCG Serum Qualitative Negative     SALICYLATE LEVEL - Normal    Salicylate <0.3     ETHYL ALCOHOL LEVEL - Normal    Alcohol ethyl <0.01     INR - Normal    INR 1.02     INFLUENZA A/B, RSV, & SARS-COV2 PCR - Normal    Influenza A PCR Negative      Influenza B PCR Negative      RSV PCR Negative      SARS CoV2 PCR Negative     CBC WITH PLATELETS AND DIFFERENTIAL    WBC Count 10.9      RBC Count 4.64      Hemoglobin 13.8      Hematocrit 40.8      MCV 88      MCH 29.7      MCHC 33.8      RDW 12.6      Platelet Count 303      % Neutrophils 68      % Lymphocytes 27      % Monocytes 4      % Eosinophils 1      % Basophils 0      % Immature Granulocytes 1      NRBCs per 100 WBC 0      Absolute Neutrophils 7.4      Absolute Lymphocytes 2.9      Absolute Monocytes 0.4      Absolute Eosinophils 0.1      Absolute Basophils 0.0      Absolute Immature Granulocytes 0.1      Absolute NRBCs 0.0         ED Course:  7:57 AM   I met with the patient and discussed plan with care team.     10:33 AM  Patient again after nursing had indicated that she was becoming frustrated waiting disposition.  I talked with the patient and explained to her the overall plan of care as well as typical standard process for the situation when mental health beds are at capacity.  After discussion the patient is comfortable with continuing to board pending placement.  I also connected with our mental health nurse practitioner.  Continue with current plan of  care.    Impression/Plan:  1. Verbalizes suicidal thoughts        MD Phillip Perez Scott E., MD  07/18/24 1031

## 2024-07-18 NOTE — ED NOTES
IP MH Referral Acuity Rating Score (RARS)     LMHP complete at referral to IP MH, with DEC; and, daily while awaiting IP MH placement. Call score to PPS.  CRITERIA SCORING   New 72 HH and Involuntary for IP MH (not adolescent) 0/1   Boarding over 24 hours 1/1   Vulnerable adult at least 55+ with multiple co morbidities; or, Patient age 11 or under 0/1   Suicide ideation without relief of precipitating factors 1/1   Current plan for suicide 1/1   Current plan for homicide 0/1   Imminent risk or actual attempt to seriously harm another without relief of factors precipitating the attempt 0/1   Severe dysfunction in daily living (ex: complete neglect for self care, extreme disruption in vegetative function, extreme deterioration in social interactions) 0/1   Recent (last 2 weeks) or current physical aggression in the ED 0/1   Restraints or seclusion episode in ED 0/1   Verbal aggression, agitation, yelling, etc., while in the ED 1/1   Active psychosis with psychomotor agitation or catatonia 0/1   Need for constant or near constant redirection (from leaving, from others, etc).  0/1   Intrusive or disruptive behaviors 0/1   TOTAL Acuity Total Score: 4

## 2024-07-19 ENCOUNTER — TELEPHONE (OUTPATIENT)
Dept: BEHAVIORAL HEALTH | Facility: CLINIC | Age: 29
End: 2024-07-19
Payer: COMMERCIAL

## 2024-07-19 PROCEDURE — 250N000013 HC RX MED GY IP 250 OP 250 PS 637: Performed by: EMERGENCY MEDICINE

## 2024-07-19 PROCEDURE — 99207 PR NO CHARGE LOS: CPT | Performed by: REGISTERED NURSE

## 2024-07-19 PROCEDURE — 250N000013 HC RX MED GY IP 250 OP 250 PS 637: Performed by: REGISTERED NURSE

## 2024-07-19 RX ADMIN — BUSPIRONE HYDROCHLORIDE 5 MG: 5 TABLET ORAL at 07:56

## 2024-07-19 RX ADMIN — BUSPIRONE HYDROCHLORIDE 5 MG: 5 TABLET ORAL at 13:06

## 2024-07-19 RX ADMIN — HYDROXYZINE HYDROCHLORIDE 25 MG: 25 TABLET, FILM COATED ORAL at 13:06

## 2024-07-19 RX ADMIN — BUSPIRONE HYDROCHLORIDE 5 MG: 5 TABLET ORAL at 20:15

## 2024-07-19 RX ADMIN — ARIPIPRAZOLE 10 MG: 5 TABLET ORAL at 07:56

## 2024-07-19 RX ADMIN — LAMOTRIGINE 25 MG: 25 TABLET ORAL at 07:56

## 2024-07-19 RX ADMIN — TRAZODONE HYDROCHLORIDE 50 MG: 50 TABLET ORAL at 20:15

## 2024-07-19 ASSESSMENT — ACTIVITIES OF DAILY LIVING (ADL)
ADLS_ACUITY_SCORE: 37

## 2024-07-19 NOTE — ED NOTES
Pt asleep on bed at this time. RN did update pt that nutrition will bring new tray and then will give pt Seroquel and pt states ok, calm and cooperative at this time.
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
## 2024-07-19 NOTE — ED NOTES
Pt given word find and bailey as complains of being bored. Cooperative, declines needing anything for anxiety, 1:1 at bedside.

## 2024-07-19 NOTE — ED NOTES
Pt awake on bed, calm and cooperative at this time. Ipad placed in room and will be calling around 1215 pt updated on this, 1:1 remains at bedside.

## 2024-07-19 NOTE — TELEPHONE ENCOUNTER
R:[place]  Access Inpatient Bed Call Log 7/19/24 @ 3:30 PM   Intake has called facilities that have not updated the bed status within the last 12 hours.                                   [facility] is posting 0 beds.            [facility] is posting 0 beds. [phone]; Per Dudley at 3:25 PM general beds w/roommates available.   [facility] is posting 0 beds. Negative covid required     [facility] is posting 0 beds. Neg covid. No high school/Elena-psych. [phone].  Per call to [facility] at 3:27 PM, at capacity for the night.   [facility] is posting 0 beds. [phone]     [facility] is posting 0 beds. [phone]      [facility] is posting 3 beds. Young Adult.  Ages 18-35. Negative covid. [phone].     [facility] is posting 0 beds.      [facility] is posting 0 beds. [phone]     [facility] is posting 4 beds. LOW acuity ONLY. Mixed unit 12+. Negative covid- [phone]   [facility] has 1 bed posted. No aggression. Negative Covid. Low acuity.     [facility] is posting 0 beds. Negative covid. [phone];    [facility] is posting 2 beds. Low acuity only. Neg covid.  [phone]      [facility] is posting 3 beds. Low acuity. No current aggression.       [facility] ([place] has 0 beds available. Negative covid.  [phone].         [facility] is posting 0 beds. Low acuity. 72 HH hold preferred. Negative covid required. [phone];    [facility] is posting 3 beds. Low acuity only. Neg covid.  [phone]       [facility] [place] is posting 3 beds.  Negative covid required.   Vol only, No history of aggression, violence, or assault. No sexual offenders. No 72 HH holds. [phone]      [facility] is posting 0 beds. Negative covid required.  (Must have the cognitive ability to do programming. No aggressive or violent  behavior or recent HX in the last 2 yrs. MH must be primary.) Always low acuity. [phone]      [facility] has 0 beds posted. Negative covid required.  Low acuity only. Violence and aggression capped.  [phone]    [facility] is posting 3 beds. Low acuity, Negative covid required. [phone]   [facility] posting 5 beds Negative covid required.  [phone]   [facility] [place] is posting 2 beds. Negative covid. LOW acuity. (No lines, drains, or tubes, oxygen, CPAP, IV, etc.) Must Have a Ride Home. [phone]   [facility] is posting 4 beds. [phone]; Negative covid. (No. lines, drains, or tubes, oxygen, CPAP, IV, etc.)   [facility] is posting 14 beds. No covid test required. [phone] [place] of [place]         Pt remains on the worklist pending appropriate bed availability.

## 2024-07-19 NOTE — PLAN OF CARE
7/17/2024  [facility] 1995     Writer consulted with ED provider, Chester Lara MD  ,  on this date at  7/19/24:12:18 PM. It was determined that pt would not benefit from assessment at this time due to Pt has already had an initial DEC assessment, ED has been referred to Extended Care for ongoing support while in the ED.  Extended Care  already re-assessed this Pt earlier today and recommended Pt to continue with inpatient psychiatric placement due to active suicidal ideations with plan and unable to engage in her safety plan.  Chester Lara MD acknowledged as he will continue to have Pt under 72-hour hold and find inpatient psychiatric placement.      ED will call DEC at [phone] when pt is ready and able to participate in assessment.     OR DEC order has been closed at this time.    JAYCEE Pierre    Provider at bedside for discharge. Pt signs AMA paperwork with writer as witness. Pt declines discharge VS. Pt is provided her belongings and changes.

## 2024-07-19 NOTE — ED NOTES
Pt resting on cart, sleeping at this time, NA remains at bedside for 1:1.  Will continue to monitor.
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
## 2024-07-19 NOTE — ED NOTES
Report given to DARWIN Vazquez who will resume cares. Bedside attendant continues for safety. Resting and cooperative.

## 2024-07-19 NOTE — ED NOTES
"Pt awake on cart, was cooperative with VS and meds. Pt cooperative with cares. Pt brief with RN, when asked about suicidal thoughts, pt states 'I just dont want to be here in the hospital.\" Not very forthcoming with questions. Brief and limited eye contact. Pt asked to wash up in bathroom and 1;1 NA assisted with this. Pt also given clean scrubs to change into. Behavior controlled at this time and cooperative, will continue to monitor.   "

## 2024-07-19 NOTE — ED PROVIDER NOTES
"[facility] EMERGENCY ROOM   ED Mental Health Observation - Daily Note for 7/19/2024    Monica Morales is a 29 year old female currently boarding in the ED while awaiting placement for Mental health crisis.  Please see the initial H&P for this patient's presentation, workup, and disposition plan.     Hold Status:  Patient is Voluntary, but holdable    Plan:  In brief, the patient's presentation is notable for PTSD, presenting to emergency department with suicidal ideation and a plan.   Patient is awaiting Mental health placement    Interim History:  There were no significant events since last note.    Physical Exam:  /71   Pulse 78   Temp 98.7  F (37.1  C) (Oral)   Resp 16   Ht 1.676 m (5' 6\")   Wt 117.9 kg (260 lb)   LMP 07/14/2024   SpO2 100%   BMI 41.97 kg/m    No respiratory distress, on room air   Well perfused  Behavior appropriate    Medications provided prior to my care:  Medications   acetaminophen (TYLENOL) tablet 650 mg (has no administration in time range)   ibuprofen (ADVIL/MOTRIN) tablet 600 mg (has no administration in time range)   hydrOXYzine HCl (ATARAX) tablet 25 mg (has no administration in time range)   QUEtiapine (SEROquel) tablet 100 mg (has no administration in time range)   melatonin tablet 6 mg (has no administration in time range)   lamoTRIgine (LaMICtal) tablet 25 mg (25 mg Oral $Given 7/18/24 1105)   ARIPiprazole (ABILIFY) tablet 10 mg (10 mg Oral $Given 7/18/24 1105)   busPIRone (BUSPAR) tablet 5 mg (5 mg Oral $Given 7/18/24 1933)   traZODone (DESYREL) tablet 50 mg (50 mg Oral $Given 7/18/24 2147)   propranolol (INDERAL) tablet 10 mg (has no administration in time range)   lamoTRIgine (LaMICtal) tablet 25 mg (25 mg Oral $Given 7/17/24 2126)   ziprasidone (GEODON) injection 10 mg (10 mg Intramuscular $Given 7/18/24 2321)   LORazepam (ATIVAN) injection 2 mg (2 mg Intramuscular $Given 7/18/24 2320)       Laboratory (reviewed and " interpreted):  Labs Ordered and Resulted from Time of ED Arrival to Time of ED Departure   ACETAMINOPHEN LEVEL - Abnormal       Result Value    Acetaminophen <5.0 (*)    URINE DRUG SCREEN PANEL - Abnormal    Amphetamines Urine Screen Negative      Barbituates Urine Screen Negative      Benzodiazepine Urine Screen Negative      Cannabinoids Urine Screen Positive (*)     Cocaine Urine Screen Negative      Fentanyl Qual Urine Screen Negative      Opiates Urine Screen Negative      PCP Urine Screen Negative     BASIC METABOLIC PANEL - Normal    Sodium 143      Potassium 3.7      Chloride 106      Carbon Dioxide (CO2) 25      Anion Gap 12      Urea Nitrogen 10.1      Creatinine 0.87      GFR Estimate >90      Calcium 9.5      Glucose 80     HEPATIC FUNCTION PANEL - Normal    Protein Total 7.6      Albumin 4.5      Bilirubin Total 0.6      Alkaline Phosphatase 108      AST 28      ALT 14      Bilirubin Direct <0.20     HCG QUALITATIVE PREGNANCY - Normal    hCG Serum Qualitative Negative     SALICYLATE LEVEL - Normal    Salicylate <0.3     ETHYL ALCOHOL LEVEL - Normal    Alcohol ethyl <0.01     INR - Normal    INR 1.02     INFLUENZA A/B, RSV, & SARS-COV2 PCR - Normal    Influenza A PCR Negative      Influenza B PCR Negative      RSV PCR Negative      SARS CoV2 PCR Negative     CBC WITH PLATELETS AND DIFFERENTIAL    WBC Count 10.9      RBC Count 4.64      Hemoglobin 13.8      Hematocrit 40.8      MCV 88      MCH 29.7      MCHC 33.8      RDW 12.6      Platelet Count 303      % Neutrophils 68      % Lymphocytes 27      % Monocytes 4      % Eosinophils 1      % Basophils 0      % Immature Granulocytes 1      NRBCs per 100 WBC 0      Absolute Neutrophils 7.4      Absolute Lymphocytes 2.9      Absolute Monocytes 0.4      Absolute Eosinophils 0.1      Absolute Basophils 0.0      Absolute Immature Granulocytes 0.1      Absolute NRBCs 0.0         ED Course:   I met with the patient and discussed plan with care team. Still looking  for inpt pych bed. Pt calm and does not have any requests from me at this time .    Impression/Plan:  1. Verbalizes suicidal thoughts    2. Anxiety and depression        Chester Lara M.D.         Chester Lara MD  07/19/24 8256
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
## 2024-07-19 NOTE — PROGRESS NOTES
IP MH Referral Acuity Rating Score (RARS)    LMHP complete at referral to IP MH, with DEC; and, daily while awaiting IP MH placement. Call score to PPS.  CRITERIA SCORING   New 72 HH and Involuntary for IP MH (not adolescent) 1/1   Boarding over 24 hours 1/1   Vulnerable adult at least 55+ with multiple co morbidities; or, Patient age 11 or under 0/1   Suicide ideation without relief of precipitating factors 1/1   Current plan for suicide 1/1   Current plan for homicide 0/1   Imminent risk or actual attempt to seriously harm another without relief of factors precipitating the attempt 0/1   Severe dysfunction in daily living (ex: complete neglect for self care, extreme disruption in vegetative function, extreme deterioration in social interactions) 1/1   Recent (last 2 weeks) or current physical aggression in the ED 1/1   Restraints or seclusion episode in ED 1/1   Verbal aggression, agitation, yelling, etc., while in the ED 1/1   Active psychosis with psychomotor agitation or catatonia 1/1   Need for constant or near constant redirection (from leaving, from others, etc).  0/1   Intrusive or disruptive behaviors 0/1   TOTAL Acuity Total Score: 9
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
## 2024-07-19 NOTE — TELEPHONE ENCOUNTER
R:    8:15a Received call from [facility] informing that they can re-review pt this AM for potential admission to their facility, requesting updated notes on the event and 72HH paperwork. Intake to fax.     8:21a Faxed clinical from yesterday night and this AM along with 72HH, awaiting response.     10:24a Refaxed updated clinical d/t PC noting faxing problems.    10:52a Faxed updated Progress note by [facility]ly from 9:36a.     12:22p Called [facility] to inquire about update. PC informed pt has been declined d/t pt acuity and unit acuity at this time, PC can re-review pt for potential admission on Monday if pt is still on the WL.    12:27p Called [facility] ED Nurse Arelis informing pt has been declined to PC d/t pt and unit acuity but that pt can be reassessed on Monday if pt still needs IP MH admission.     1:03p Paged Psychiatry NP Julissa, awaiting response.    [1:37 PM] Jena Costa    MRN: [id number].  Declined to [facility] [place] d/t patient and unit acuity.  Patient has been physically aggressive with staff in the ED.  She received emergency IM medications last night and was in restraints until early this morning.        [1:37 PM] Jena Costa    Would recommend considering admission for [facility]

## 2024-07-19 NOTE — ED NOTES
MD went in and talked with pt explained unable to leave due to hold and pt states then what's  the plan, explained waiting for a bed someone for inpatient psych. Pt upset about the food she is being sent says she keeps getting the same thing but does not like it so isn't eating. Pt offered PRN Seroquel says she will take but will want to eat first, contacted nutrition for a different meal option.

## 2024-07-19 NOTE — CONSULTS
Psychiatry Consultation; Follow up     I discussed patient status with Jackie Coats from [facility]. Per report, patient declined to safety plan and reiterates an intent for suicide. The plan continues to be for transition to inpatient psychiatry for additional medication management and stabilization.     Recommendations  Patient has the following medications that were restarted 7/18/24:  Lamictal 25 mg daily for mood  Abilify 10 mg daily for mood  Buspar 5 mg TID - restarted at lower dose, as rx refilled over one year ago  Propranolol 10 mg TID PRN for anxiety and agitation  Trazodone 50 mg at HS for sleep       Patient does not meet criteria for commitment at this time. However, would continue referral for inpatient psychiatry for further stabilization and medication management.         Page me or re-consult psychiatry as needed.       Ban Reece, RUTH, RODNEY  Consult/Liaison Psychiatry  [facility]   Contact information available via [facility] Paging/Directory.  If I am not available, please call [facility] intake ([phone])

## 2024-07-19 NOTE — ED PROVIDER NOTES
Update on care:    Patient had an outburst here prior to being transported as she states she has PTSD from being strapped down.  She became agitated and aggressive when she was being strapped to the transport EMS with their normal safety straps.  Code Brien was called and patient was sedated after aggressive behavior towards staff.    Charge nurse discussed case with [facility] and they did not want patient be transferred tonight and will reevaluate in the morning.     Ohl, Osman Lisa,   07/18/24 4122

## 2024-07-19 NOTE — ED NOTES
Extended care called and [facility] no longer accepting pt due to behavior and current acuity there, maybe able to reevaluate Monday for [facility].

## 2024-07-19 NOTE — PROGRESS NOTES
"Triage & Transition Services, Extended Care     Therapy Progress Note    Patient: Monica goes by \"Monica,\" uses she/her pronouns  Date of Service: July 19, 2024  Site of Service: [facility] EMERGENCY ROOM                             [id number]  Patient was seen yes  Mode of Assessment: Virtual: AmWell    Presentation Summary: Greeted patient. Introduced self and role. Pt reported she made the mistake of coming to the hospital and does not want to be here. Discussed what brought patient in. Pt reported active suicidal ideation with plans to kill herself due to her PTSD but reported she does not want to talk about it because she wants to leave the hospital. Pt does not consent to services and wants to leave after code was called yesterday and pt placed in restraints with 72HH. Pt is guarded and does not want to talk or safety plan. Pt presents with irritability. Writer explained 72HH, inpatient recommendation, and process. Pt asked when she would be leaving the ED. Discussed looking for inpatient placement. Pt no longer wants to continue the conversation.    Therapeutic Intervention(s) Provided: Engaged in cognitive restructuring/ reframing, looked at common cognitive distortions and challenged negative thoughts., Coached on coping techniques/relaxation skills to help improve distress tolerance and managing intense emotions., Reviewed healthy living that supports positive mental health, including looking at sleep hygiene, regular movement, nutrition, and regular socialization.    Current Symptoms:  (Pt would not answer) irritable, impaired decision making, thoughts of death/suicide, low self esteem, decreased libido, hoplessness, helplessness, avoidance, apathy, difficulty concentrating, sense of doom  (Pt would not answer) impulsive, hostile/aggressive, displaces blame  (Pt would not answer)    Mental Status Exam   Affect: Constricted  Appearance: Disheveled  Attention Span/Concentration: " "Other (please comment) (varied)  Eye Contact: Variable    Fund of Knowledge: Appropriate   Language /Speech Content: Fluent  Language /Speech Volume: Soft  Language /Speech Rate/Productions: Minimally Responsive, Normal  Recent Memory: Variable  Remote Memory: Variable  Mood: Apathetic, Irritable  Orientation to Person: Yes   Orientation to Place: Yes  Orientation to Time of Day: Yes  Orientation to Date: Yes     Situation (Do they understand why they are here?): Yes  Psychomotor Behavior: Normal  Thought Content: Suicidal  Thought Form: Intact    Treatment Objective(s) Addressed: rapport building, identifying and practicing coping strategies, processing feelings, identifying an appropriate aftercare plan, assessing safety, orienting the patient to therapy, identifying treatment goals    Patient Response to Interventions: needs reinforcement, no evidence of understanding    Progress Towards Goals: Patient Reports Symptoms Are: ongoing  Patient Progress Toward Goals: is not making progress  Comment: Pt does not want to engage in session with writer or safety plan for a lower level of care.  Next Step to Work Toward Discharge: symptom stabilization, patient ability to engage in safety planning, engaging in safety planning with collateral sources    Case Management: Case Management Included: collaborating with patient's support system  Details on Collaborating with Patient's Support System: Spoke with RN: Arelis De Leon, consutled with C&[facility] psych provider: Ban Reece  Summary of Interaction: Per RN: she was cooperative with cares and meds this am. Per Ban: Will see patient at a later time    Plan: inpatient mental health  yes provider Dr. Lara, Psych provider Ban Reece  no    Clinical Substantiation: Recommendation for inpatient mental health does not change at this time due to active suicidal ideation and plans. Pt would not elaborate as she reported she \"wants to leave the hospital\". Pt is " unable to safety plan for a lower level of care and placed on 72HH.    Legal Status: Legal Status at Admission: 72 Hour Hold  72 Hour Hold - Date/Time Initiated: 07/18/24 08:13 PM  72 Hour Hold - Date/Time Ends: 07/23/24 08:13 PM    Session Status: Time session started: 0900  Time session ended: 0912  Session Duration (minutes): 12 minutes  Session Number: 0  Anticipated number of sessions or this episode of care: 4    Time Spent: 12 minutes    CPT Code: CPT Codes: Non-Billable    Diagnosis:   Patient Active Problem List   Diagnosis Code    Obesity E66.9    Substance abuse (H) F19.10    Seasonal allergic rhinitis due to pollen J30.1    Routine general medical examination at a health care facility Z00.00    Anxiety and depression F41.9, F32.A    Papanicolaou smear of cervix with low grade squamous intraepithelial lesion (LGSIL) R87.612    Allergic rhinitis J30.9    Suicide ideation R45.851    Morbid obesity (H) E66.01    Drug overdose, undetermined intent, initial encounter T50.904A    Suicide attempt (H) T14.91XA    Bipolar II disorder (H) F31.81    Palpable mass of soft tissue of thigh M79.89    PTSD (post-traumatic stress disorder) F43.10    Bipolar disorder, current episode depressed, severe, with psychotic features (H) F31.5    LARRY (generalized anxiety disorder) F41.1       Primary Problem This Admission: Active Hospital Problems    PTSD (post-traumatic stress disorder)      Bipolar disorder, current episode depressed, severe, with psychotic features (H)      LARRY (generalized anxiety disorder)        Jackie Coats [facility]   Licensed Mental Health Professional (LMHP), [facility]  [phone]

## 2024-07-20 ENCOUNTER — TELEPHONE (OUTPATIENT)
Dept: BEHAVIORAL HEALTH | Facility: CLINIC | Age: 29
End: 2024-07-20
Payer: COMMERCIAL

## 2024-07-20 PROCEDURE — 250N000013 HC RX MED GY IP 250 OP 250 PS 637: Performed by: REGISTERED NURSE

## 2024-07-20 PROCEDURE — 250N000013 HC RX MED GY IP 250 OP 250 PS 637: Performed by: EMERGENCY MEDICINE

## 2024-07-20 RX ADMIN — TRAZODONE HYDROCHLORIDE 50 MG: 50 TABLET ORAL at 20:26

## 2024-07-20 RX ADMIN — BUSPIRONE HYDROCHLORIDE 5 MG: 5 TABLET ORAL at 09:10

## 2024-07-20 RX ADMIN — HYDROXYZINE HYDROCHLORIDE 25 MG: 25 TABLET, FILM COATED ORAL at 11:46

## 2024-07-20 RX ADMIN — BUSPIRONE HYDROCHLORIDE 5 MG: 5 TABLET ORAL at 20:26

## 2024-07-20 RX ADMIN — BUSPIRONE HYDROCHLORIDE 5 MG: 5 TABLET ORAL at 14:36

## 2024-07-20 RX ADMIN — HYDROXYZINE HYDROCHLORIDE 25 MG: 25 TABLET, FILM COATED ORAL at 20:28

## 2024-07-20 RX ADMIN — LAMOTRIGINE 25 MG: 25 TABLET ORAL at 09:10

## 2024-07-20 RX ADMIN — ARIPIPRAZOLE 10 MG: 5 TABLET ORAL at 09:10

## 2024-07-20 ASSESSMENT — ACTIVITIES OF DAILY LIVING (ADL)
ADLS_ACUITY_SCORE: 37
ADLS_ACUITY_SCORE: 38
ADLS_ACUITY_SCORE: 38
ADLS_ACUITY_SCORE: 37
ADLS_ACUITY_SCORE: 38
ADLS_ACUITY_SCORE: 37
ADLS_ACUITY_SCORE: 37
ADLS_ACUITY_SCORE: 38
ADLS_ACUITY_SCORE: 37
ADLS_ACUITY_SCORE: 38
ADLS_ACUITY_SCORE: 37
ADLS_ACUITY_SCORE: 38
ADLS_ACUITY_SCORE: 38

## 2024-07-20 NOTE — TELEPHONE ENCOUNTER
R: [place]  Access Inpatient Bed Call Log 7/20/24 3:30 PM      Intake has called facilities that have not updated the bed status within the last 12 hours.                                     [facility] is at capacity                    [facility] is posting 0 beds. [phone]           [facility] is posting 0 beds. Negative covid required           [facility] is posting 0 beds. Neg covid. No high school/Elena-psych. [phone].           [facility] is posting 0 beds. [phone]           [facility] is posting 0 beds. [phone]           [facility] is posting 3 beds. Negative covid. [phone].           [facility] is posting 0 beds [phone]             [facility] is posting 6 beds. LOW acuity ONLY. Mixed unit 12+. Negative covid- [phone]           [facility] has 1 bed posted. No aggression. Negative Covid. Low acuity.           [facility] is posting 0 beds. Negative covid. [phone]           [facility] is posting 0 beds. Low acuity only. Neg covid.  [phone]           [facility] is posting 3 beds. Low acuity. No current aggression.            [facility] is posting 0 beds available. Negative covid.  [phone].              [facility] is posting 0 beds. Low acuity. 72 HH hold preferred. Negative covid required. [phone]           [facility] is posting 3 beds. Low acuity only. Neg covid.  [phone]           [facility] [place] is posting 3 beds.  Negative covid required.   Vol only, No history of aggression, violence, or assault. No sexual offenders. No 72 HH holds. [phone]              [facility] is posting 4 beds. Negative covid required.  (Must have the cognitive ability to do programming. No aggressive or violent behavior or recent HX in the last 2 yrs. MH must be primary.)  Always low acuity. [phone]           [facility] has 3 beds posted. Negative covid required.  Low acuity only. Violence and aggression capped.  [phone] Per call, currently at divert.           [facility] is posting 3 beds. Low acuity, Negative covid required. [phone]           [facility] posting 5 beds Negative covid required.  [phone]           [facility] is posting 2 beds. Negative covid. LOW acuity. (No lines, drains, or tubes, oxygen, CPAP, IV, etc.) Must Have a Ride Home. [phone]           [facility] is posting 4 beds. Negative covid. (No. lines, drains, or tubes, oxygen, CPAP, IV, etc.)           [facility] is posting 14 beds. No covid test required. [phone]     Pt remains on the work list pending appropriate bed availability.

## 2024-07-20 NOTE — ED NOTES
Pt states she is feeling much better tonight.  Has been up during the day talking with staff, engaging and laughing.  Denying any suicidal ideation throughout my shift.

## 2024-07-20 NOTE — ED NOTES
1:1 did not report any concerns with patient last night. She did get up a few times to go to the restroom, she was monitored with the door open at all times. She denied pain or needing a PRN for anxiety. She is AOX4, and does not appear dysregulated or in distress. Has had a flat affect, but been appropriate with staff. She slept most of the night.

## 2024-07-20 NOTE — ED NOTES
1:1 sitting in the room. Pt is resting, does not appear dysregulated at this moment. Will continue to monitor.

## 2024-07-20 NOTE — ED PROVIDER NOTES
"[facility] EMERGENCY ROOM   ED Mental Health Observation - Daily Note for 7/20/2024    Monica Morales is a 29 year old female currently boarding in the ED while awaiting placement for Mental health crisis.  Please see the initial H&P for this patient's presentation, workup, and disposition plan.     Hold Status:  Patient is Voluntary, but holdable    Plan:  In brief, the patient's presentation is notable for suicidal ideation with plan.   Patient is awaiting Mental health placement    Interim History:  There were no significant events since last note.    Physical Exam:  BP (!) 142/85 (BP Location: Left arm)   Pulse 68   Temp 98.1  F (36.7  C) (Oral)   Resp 18   Ht 1.676 m (5' 6\")   Wt 117.9 kg (260 lb)   LMP 07/14/2024   SpO2 98%   BMI 41.97 kg/m    No respiratory distress, on room air   Well perfused  Behavior appropriate    Medications provided prior to my care:  Medications   acetaminophen (TYLENOL) tablet 650 mg (has no administration in time range)   ibuprofen (ADVIL/MOTRIN) tablet 600 mg (has no administration in time range)   hydrOXYzine HCl (ATARAX) tablet 25 mg (25 mg Oral $Given 7/20/24 1146)   QUEtiapine (SEROquel) tablet 100 mg (100 mg Oral Not Given 7/19/24 1433)   melatonin tablet 6 mg (has no administration in time range)   lamoTRIgine (LaMICtal) tablet 25 mg (25 mg Oral $Given 7/20/24 0910)   ARIPiprazole (ABILIFY) tablet 10 mg (10 mg Oral Not Given 7/20/24 1428)   busPIRone (BUSPAR) tablet 5 mg (5 mg Oral $Given 7/20/24 1436)   traZODone (DESYREL) tablet 50 mg (50 mg Oral $Given 7/19/24 2015)   propranolol (INDERAL) tablet 10 mg (has no administration in time range)   lamoTRIgine (LaMICtal) tablet 25 mg (25 mg Oral $Given 7/17/24 2126)   ziprasidone (GEODON) injection 10 mg (10 mg Intramuscular $Given 7/18/24 2321)   LORazepam (ATIVAN) injection 2 mg (2 mg Intramuscular $Given 7/18/24 2320)       Laboratory (reviewed and interpreted):  Labs Ordered and " Resulted from Time of ED Arrival to Time of ED Departure   ACETAMINOPHEN LEVEL - Abnormal       Result Value    Acetaminophen <5.0 (*)    URINE DRUG SCREEN PANEL - Abnormal    Amphetamines Urine Screen Negative      Barbituates Urine Screen Negative      Benzodiazepine Urine Screen Negative      Cannabinoids Urine Screen Positive (*)     Cocaine Urine Screen Negative      Fentanyl Qual Urine Screen Negative      Opiates Urine Screen Negative      PCP Urine Screen Negative     BASIC METABOLIC PANEL - Normal    Sodium 143      Potassium 3.7      Chloride 106      Carbon Dioxide (CO2) 25      Anion Gap 12      Urea Nitrogen 10.1      Creatinine 0.87      GFR Estimate >90      Calcium 9.5      Glucose 80     HEPATIC FUNCTION PANEL - Normal    Protein Total 7.6      Albumin 4.5      Bilirubin Total 0.6      Alkaline Phosphatase 108      AST 28      ALT 14      Bilirubin Direct <0.20     HCG QUALITATIVE PREGNANCY - Normal    hCG Serum Qualitative Negative     SALICYLATE LEVEL - Normal    Salicylate <0.3     ETHYL ALCOHOL LEVEL - Normal    Alcohol ethyl <0.01     INR - Normal    INR 1.02     INFLUENZA A/B, RSV, & SARS-COV2 PCR - Normal    Influenza A PCR Negative      Influenza B PCR Negative      RSV PCR Negative      SARS CoV2 PCR Negative     CBC WITH PLATELETS AND DIFFERENTIAL    WBC Count 10.9      RBC Count 4.64      Hemoglobin 13.8      Hematocrit 40.8      MCV 88      MCH 29.7      MCHC 33.8      RDW 12.6      Platelet Count 303      % Neutrophils 68      % Lymphocytes 27      % Monocytes 4      % Eosinophils 1      % Basophils 0      % Immature Granulocytes 1      NRBCs per 100 WBC 0      Absolute Neutrophils 7.4      Absolute Lymphocytes 2.9      Absolute Monocytes 0.4      Absolute Eosinophils 0.1      Absolute Basophils 0.0      Absolute Immature Granulocytes 0.1      Absolute NRBCs 0.0           Impression/Plan:  1. Verbalizes suicidal thoughts    2. Anxiety and depression        MARTA WOODS,           Oneil, Shahzad Beck, DO  07/20/24 1454

## 2024-07-20 NOTE — ED NOTES
Pt requesting to go to the bathroom, gave the key to 1:1 staff. Informed the aid that the pt must have the door open for monitoring and safety. 1:1 aid is aware. Pt is not requesting anything at this time.

## 2024-07-20 NOTE — TELEPHONE ENCOUNTER
R: [place]  Access Inpatient Bed Call Log 7/20/24 8:10 AM    Intake has called facilities that have not updated the bed status within the last 12 hours.                             [facility] is at capacity           [facility] is posting 0 beds. [phone]   [facility] is posting 0 beds. Negative covid required  [facility] is posting 0 beds. Neg covid. No high school/Elena-psych. [phone].   [place] is posting 0 beds. [phone]  [facility] is posting 0 beds. [phone]   [facility] is posting 3 beds. Negative covid. [phone]. Per call at 7:51am, no answer  [facility] is posting 0 beds [phone]    [facility] is posting 6 beds. LOW acuity ONLY. Mixed unit 12+. Negative covid- [phone]  [facility] has 1 bed posted. No aggression. Negative Covid. Low acuity.  [facility] is posting 0 beds. Negative covid. [phone]   [facility] is posting 0 beds. Low acuity only. Neg covid.  [phone]   [facility] is posting 3 beds. Low acuity. No current aggression.    [facility] is posting 0 beds available. Negative covid.  [phone].      [facility] is posting 1 bed. Low acuity. 72 HH hold preferred. Negative covid required. [phone]   [facility] is posting 3 beds. Low acuity only. Neg covid.  [phone]   [facility] [place] is posting 3 beds.  Negative covid required.   Vol only, No history of aggression, violence, or assault. No sexual offenders. No 72 HH holds. [phone]     [facility] is posting 3 beds. Negative covid required.  (Must have the cognitive ability to do programming. No aggressive or violent behavior or recent HX in the last 2 yrs. MH must be primary.) Always low acuity. [phone]   [facility] has 0 beds posted. Negative covid required.  Low acuity only. Violence  and aggression capped.  [phone] Per call, currently at divert.  [facility] is posting 3 beds. Low acuity, Negative covid required. [phone]   [facility] posting 6 beds Negative covid required.  [phone]   [facility] is posting 0 beds. Negative covid. LOW acuity. (No lines, drains, or tubes, oxygen, CPAP, IV, etc.) Must Have a Ride Home. [phone]   [facility] is posting 4 beds. Negative covid. (No. lines, drains, or tubes, oxygen, CPAP, IV, etc.) Per call @7:57am, beds available. Did not state how many    Pt remains on the work list pending appropriate bed availability.

## 2024-07-20 NOTE — CONSULTS
DEC Consult Order placed. DEC assessment completed by Marshall Shine on 7/17/2024 at 6:46PM. Consult acknowledged and completed.     Patsy Loco

## 2024-07-20 NOTE — ED NOTES
"Pt on 1:1 continuous observation.  Taken to 2N to shower and returned to room.  Bedding changed.  Cooperative and pleasant this morning.  Now lying in bed coloring.  \"It helps with my anxiety.\"  Given copies of word search also.  "

## 2024-07-21 ENCOUNTER — TELEPHONE (OUTPATIENT)
Dept: BEHAVIORAL HEALTH | Facility: CLINIC | Age: 29
End: 2024-07-21
Payer: COMMERCIAL

## 2024-07-21 VITALS
WEIGHT: 260 LBS | DIASTOLIC BLOOD PRESSURE: 88 MMHG | TEMPERATURE: 98.2 F | SYSTOLIC BLOOD PRESSURE: 133 MMHG | BODY MASS INDEX: 41.78 KG/M2 | HEART RATE: 68 BPM | RESPIRATION RATE: 18 BRPM | HEIGHT: 66 IN | OXYGEN SATURATION: 99 %

## 2024-07-21 PROCEDURE — 250N000013 HC RX MED GY IP 250 OP 250 PS 637: Performed by: REGISTERED NURSE

## 2024-07-21 RX ADMIN — LAMOTRIGINE 25 MG: 25 TABLET ORAL at 09:02

## 2024-07-21 RX ADMIN — ARIPIPRAZOLE 10 MG: 5 TABLET ORAL at 09:02

## 2024-07-21 RX ADMIN — BUSPIRONE HYDROCHLORIDE 5 MG: 5 TABLET ORAL at 09:02

## 2024-07-21 ASSESSMENT — ACTIVITIES OF DAILY LIVING (ADL)
ADLS_ACUITY_SCORE: 38

## 2024-07-21 NOTE — ED PROVIDER NOTES
"[facility] EMERGENCY ROOM   ED Mental Health Observation - Daily Note for 7/21/2024    Monica Morales is a 29 year old female currently boarding in the ED while awaiting placement for Mental health crisis.  Please see the initial H&P for this patient's presentation, workup, and disposition plan.     Hold Status:  Patient is on a 72 hour hold    Plan:  In brief, the patient's presentation is notable for suicidal ideations, bed at [place] potentially available for admission.  .   Patient is awaiting Mental health placement    Interim History:  There were no significant events since last note.    Physical Exam:  /68 (BP Location: Left arm)   Pulse 79   Temp 98.4  F (36.9  C) (Oral)   Resp 18   Ht 1.676 m (5' 6\")   Wt 117.9 kg (260 lb)   LMP 07/14/2024   SpO2 99%   BMI 41.97 kg/m    No respiratory distress, on room air   Well perfused  Behavior appropriate    Medications provided prior to my care:  Medications   acetaminophen (TYLENOL) tablet 650 mg (has no administration in time range)   ibuprofen (ADVIL/MOTRIN) tablet 600 mg (has no administration in time range)   hydrOXYzine HCl (ATARAX) tablet 25 mg (25 mg Oral $Given 7/20/24 2028)   QUEtiapine (SEROquel) tablet 100 mg (100 mg Oral Not Given 7/19/24 1433)   melatonin tablet 6 mg (has no administration in time range)   lamoTRIgine (LaMICtal) tablet 25 mg (25 mg Oral $Given 7/20/24 0910)   ARIPiprazole (ABILIFY) tablet 10 mg (10 mg Oral Not Given 7/20/24 1428)   busPIRone (BUSPAR) tablet 5 mg (5 mg Oral $Given 7/20/24 2026)   traZODone (DESYREL) tablet 50 mg (50 mg Oral $Given 7/20/24 2026)   propranolol (INDERAL) tablet 10 mg (has no administration in time range)   lamoTRIgine (LaMICtal) tablet 25 mg (25 mg Oral $Given 7/17/24 2126)   ziprasidone (GEODON) injection 10 mg (10 mg Intramuscular $Given 7/18/24 2321)   LORazepam (ATIVAN) injection 2 mg (2 mg Intramuscular $Given 7/18/24 2320)       Laboratory (reviewed " and interpreted):  Labs Ordered and Resulted from Time of ED Arrival to Time of ED Departure   ACETAMINOPHEN LEVEL - Abnormal       Result Value    Acetaminophen <5.0 (*)    URINE DRUG SCREEN PANEL - Abnormal    Amphetamines Urine Screen Negative      Barbituates Urine Screen Negative      Benzodiazepine Urine Screen Negative      Cannabinoids Urine Screen Positive (*)     Cocaine Urine Screen Negative      Fentanyl Qual Urine Screen Negative      Opiates Urine Screen Negative      PCP Urine Screen Negative     BASIC METABOLIC PANEL - Normal    Sodium 143      Potassium 3.7      Chloride 106      Carbon Dioxide (CO2) 25      Anion Gap 12      Urea Nitrogen 10.1      Creatinine 0.87      GFR Estimate >90      Calcium 9.5      Glucose 80     HEPATIC FUNCTION PANEL - Normal    Protein Total 7.6      Albumin 4.5      Bilirubin Total 0.6      Alkaline Phosphatase 108      AST 28      ALT 14      Bilirubin Direct <0.20     HCG QUALITATIVE PREGNANCY - Normal    hCG Serum Qualitative Negative     SALICYLATE LEVEL - Normal    Salicylate <0.3     ETHYL ALCOHOL LEVEL - Normal    Alcohol ethyl <0.01     INR - Normal    INR 1.02     INFLUENZA A/B, RSV, & SARS-COV2 PCR - Normal    Influenza A PCR Negative      Influenza B PCR Negative      RSV PCR Negative      SARS CoV2 PCR Negative     CBC WITH PLATELETS AND DIFFERENTIAL    WBC Count 10.9      RBC Count 4.64      Hemoglobin 13.8      Hematocrit 40.8      MCV 88      MCH 29.7      MCHC 33.8      RDW 12.6      Platelet Count 303      % Neutrophils 68      % Lymphocytes 27      % Monocytes 4      % Eosinophils 1      % Basophils 0      % Immature Granulocytes 1      NRBCs per 100 WBC 0      Absolute Neutrophils 7.4      Absolute Lymphocytes 2.9      Absolute Monocytes 0.4      Absolute Eosinophils 0.1      Absolute Basophils 0.0      Absolute Immature Granulocytes 0.1      Absolute NRBCs 0.0         ED Course:  7:04 AM  I met with the patient and discussed plan with care team.  "  9:45 AM DEC paged to reassess patient re: dispo with patient noting she is feeling improved overall and they requested \"new order\" to reassess, new order for DEC assessment placed.   10:36 AM I spoke with Mo from DEC who reassessed patient who has markedly improved, safety plan established, patient with SI associated with medication noncompliance but now taking her scheduled medications and feels remarkably improved, has forward thinking and plans to discharge with day treatment intake scheduled, given new psychiatrist and will f/u with her established therapist and is safe for discharge at this time, safety plan provided to patient. Patient discharged after being provided with extensive anticipatory guidance and given return precautions, importance of PMD follow-up emphasized.     Impression/Plan:  1. Verbalizes suicidal thoughts    2. Anxiety and depression        MD Palak Decker Erin E, MD  07/21/24 1037    "

## 2024-07-21 NOTE — ED NOTES
Pt requesting dose of atarax for sleep and to help her calm down. Pt has been on the phone talking with friends.  Pt asking about visiting hours, suggested visitors come in the morning so she can get a good nights sleep, pt agreeable.

## 2024-07-21 NOTE — TELEPHONE ENCOUNTER
1:33AM - Called [facility]. Cris is able to review. Faxed clinical @ 2:03AM. Awaiting CB    3:10AM - Cassanrda from [facility] called: Pt was declined d/t unit and Pt acuity and no appropriate staffing     R:  [place] [facility] Access Inpatient Bed Call Log 7/20/24 @ 11:30PM  Intake has called facilities that have not updated their bed status within the last 12 hours.    *[facility]     [facility]: @ cap  [facility]: @ cap per website   [facility]: @ cap per website   [facility]: @ cap per website. Per Stacey @ 12:09AM, they are capacity   [facility]: @ cap per website   [facility]: @ cap per website   [facility]: Posting 3 beds (Young Adult unit: No recent aggressions, violence or sexual assault. Neg covid required). Declined 7/19 d/t pt and unit acuity - can re-review Monday  [facility]: @ cap per website   [facility]: @ cap per website   [facility]: @ cap per website   [facility]: @ cap per website. Mixed unit/Low acuity only.   [facility]: @ cap per website   [facility]: @ cap per website   [facility]: Posting 3 beds.  Per Eliel @ 11:48PM, they are at capacity   [facility]: Does not review after 10PM.     [facility]: @ cap per website   [facility]: @ cap per website   [facility]: @ cap per website   [facility]: Posting 2 beds (No hx of aggression. No sexual offenders. Voluntary patients only). Meets exclusionary d/t 72 HH  [facility]: Posting 4 beds (Always low acuity only. Must have the cognitive ability to do programming. No aggressive or violent behavior or recent HX in the last 2 yrs. MH must be primary). Pt not appropriate d/t acuity/restraints on 7/18  [facility]: @ cap per website   [facility]: Posting 3 beds (Low acuity, Neg Covid). Per Ariana @ 11:49PM, no beds available  [facility]: Posting 5 beds. Per Stephany @ 11:45PM, 1 SD bed available for tonight d/t staffing  [facility]: Posting [facility] beds (No hx of  aggression/assault. No lines, drains or tubes. Does not provide detox or CD treatment. Must have confirmed ride home upon discharge). Per Cassandra @ 12:04AM, beds available  [facility]: Posting 14 beds. Facility is in [place]. Per PPS policy, patients must be voluntary for placement in [place] [facility]: Posting 4 beds (Mixed unit/Low acuity). Per Karla @ 12:07AM, beds available. Per Bree @ 2:09AM, they are unable to accommodate pts with a hx of aggression d/t current milieu acuity.              Pt remains on work list until appropriate placement is available

## 2024-07-21 NOTE — ED NOTES
Report and care assumed from Prema POWELL RN.  MICHAEL richardson, white board updated for rounding. Patient updated on plan of care. Patient's pain assessed. Call light within reach, bed in low position, side rails up. 1:1 at bedside.   Pt requesting to use room phone to call her friends/family.  Pt states she is feeling much better since she has been taking her medications.  Pt requesting evening medications around 2030.  Pt agrees with plan of care.  No other needs at this time. Room check done.

## 2024-07-21 NOTE — TELEPHONE ENCOUNTER
R: [place]  [place] Inpatient [place] Call Log 7/21/24 3:00 PM   Intake has called facilities that have not updated the bed status within the last 12 hours.                                                [facility] is at capacity                             [facility] is posting 0 beds. [phone]                     [facility] is posting 0 beds. Negative covid required                    [facility] is posting 0 beds. Neg covid. No high school/Elena-psych. [phone]. Per call at 7:34 am to Dilcia currently FULL.                    [facility] is posting 0 beds. [phone]                    [facility] is posting 0 beds. [phone]                     [facility] is posting 0 beds. Negative covid. [phone]. Per call at 7:36am [facility] for callback regarding bed availability.                    [facility] is posting 0 beds [phone]                        [facility] is posting 5 beds. LOW acuity ONLY. Mixed unit 12+. Negative covid- [phone]                    [facility] has 0 bed posted. No aggression. Negative Covid. Low acuity.                    [facility] is posting 0 beds. Negative covid. [phone] Per call at 7:40am  states adult and adol are at capacity.                    [facility] ([place] is posting 0 beds. Low acuity only. Neg covid.  [phone]                     [facility] is posting 2 beds. Low acuity. No current aggression.                      [facility] ([place]) is posting 0 beds available. Negative covid.  [phone].                        [facility] is posting 0 beds. Low acuity. 72 HH hold preferred. Negative covid required. [phone] Per call at 7:39am to Judit no capacity to review pts today.                    [facility] ([place] is posting 0 beds. Low acuity only. Neg covid.  [phone]                     [facility] in  [place] is posting 2 beds.  Negative covid required.   Vol only, No history of aggression, violence, or assault. No sexual offenders. No 72 HH holds. [phone] Per call at 7:43am to [facility] beds are available.                        [facility] is posting 4 beds. Negative covid required.  (Must have the cognitive ability to do programming. No aggressive or violent behavior or recent HX in the last 2 yrs. MH must be primary.) Always low acuity. [phone]                     [facility] has 0 beds posted. Negative covid required.  Low acuity only. Violence and aggression capped.  [phone].                    [facility] is posting 3 beds. Low acuity, Negative covid required. [phone] Per call at 7:47 am to Griselda 1 bed currently for review.                    [facility] posting 5 beds Negative covid required.  [phone]                     [facility] is posting 2 beds. Negative covid. LOW acuity. (No lines, drains, or tubes, oxygen, CPAP, IV, etc.) Must Have a Ride Home. [phone]                     [facility] is posting 4 beds. Negative covid. (No. lines, drains, or tubes, oxygen, CPAP, IV, etc.)                     [facility] is posting 14 beds. No covid test required. [phone] Per call at 7:44am Kassidy 1 M bed and 5 child/adol beds available.       4:06 PM [facility] contacted [place] to review for [facility], awaiting call back.    Pt remains on the work list pending appropriate bed availability.

## 2024-07-21 NOTE — TELEPHONE ENCOUNTER
R: [place]  Access Inpatient Bed Call Log 7/21/24 7:32 AM   Intake has called facilities that have not updated the bed status within the last 12 hours. -[facility]                              [facility] is at capacity            [facility] is posting 0 beds. [phone]    [facility] is posting 0 beds. Negative covid required   [facility] is posting 0 beds. Neg covid. No high school/Elena-psych. [phone]. Per call at 7:34 am to Dilcia currently FULL.   [facility] is posting 0 beds. [phone]   [facility] is posting 0 beds. [phone]    [facility] is posting 3 beds. Negative covid. [phone]. 7/19 PC declined d/t pt and unit acuity, can reassess on Monday.  [facility] is posting 0 beds [phone]       [facility] is posting 5 beds. LOW acuity ONLY. Mixed unit 12+. Negative covid- [phone]   [facility] has 0 bed posted. No aggression. Negative Covid. Low acuity.   [facility] is posting 0 beds. Negative covid. [phone] Per call at 7:40am  states adult and adol are at capacity.   [facility] is posting 0 beds. Low acuity only. Neg covid.  [phone]    [facility] is posting 2 beds. Low acuity. No current aggression.     [facility] is posting 0 beds available. Negative covid.  [phone].       [facility] is posting 0 beds. Low acuity. 72 HH hold preferred. Negative covid required. [phone] Per call at 7:39am to Judit no capacity to review pts today.   [facility] is posting 0 beds. Low acuity only. Neg covid.  [phone]    [facility] in [place] is posting 2 beds.  Negative covid required.   Vol only, No history of aggression, violence, or assault. No sexual offenders. No 72 HH holds. [phone] Per call at 7:43am to [facility] beds are available. PT NOT APPROPRIATE D/T [place] 72 HH.      [facility] is  posting 4 beds. Negative covid required.  (Must have the cognitive ability to do programming. No aggressive or violent behavior or recent HX in the last 2 yrs. MH must be primary.) Always low acuity. [phone]    [facility] has 0 beds posted. Negative covid required.  Low acuity only. Violence and aggression capped.  [phone].   [facility] is posting 3 beds. Low acuity, Negative covid required. [phone] Per call at 7:47 am to Griselda 1 bed currently for review.   [facility] posting 5 beds Negative covid required.  [phone]    [facility] is posting 2 beds. Negative covid. LOW acuity. (No lines, drains, or tubes, oxygen, CPAP, IV, etc.) Must Have a Ride Home. [phone]  7/21 PT DECLINED D/T ACUITY.  [facility] is posting 4 beds. Negative covid. (No. lines, drains, or tubes, oxygen, CPAP, IV, etc.)    [facility] is posting 14 beds. No covid test required. [phone] PT NOT APPROPRIATE D/T [place] 72 HH    Pt remains on the work list pending appropriate bed availability.
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
## 2024-07-21 NOTE — ED NOTES
ED Behavioral Health Patient Transition of Care Note      Brief behavioral history:  suicidal    Any outstanding medical concerns: none    Has SW seen the patient:   yes, may benefit from reassessment today as she notes an improvement of her mood    Is the patient on a hold:  72 hour hold signed    Current plan for disposition:  Awaiting SW evaluation    Safety concerns:  No, pt has been cooperative    Dietary restrictions:  None, pt can eat and drink ad marleen    Significant events during shift:  improved mood

## 2024-07-21 NOTE — ED NOTES
ED Behavioral Health Patient Transition of Care Note      Brief behavioral history:  Suicidal    Any outstanding medical concerns: NO    Has SW seen the patient:  no    Is the patient on a hold:  72 hour hold signed    Current plan for disposition:  RIG[facility]ERTO unable to find a bed tonight, will try in the morning    Safety concerns:  None    Dietary restrictions:  None, pt can eat and drink ad marleen    Significant events during shift:   Improved mood.

## 2024-07-21 NOTE — ED NOTES
Patient states she will work on trying to find a ride from the lobby. Told patient if she is unable to find a ride we could call her a cab

## 2024-07-21 NOTE — DISCHARGE INSTRUCTIONS
Upcoming/scheduled appointment(s):    Date: Tuesday, 7/23/2024  Time: 11:00 am - 12:00 pm  Provider: Summer JARA  CNP  Location: [facility], [address] [place] [address], [address], [place] [address]  Phone: [phone]  Type: Telepsychiatry (virtual)    Patient Instructions:  Please fill New Patient Form by using following link - all forms need to be completed 24 hours prior to the appointment date/time by going to: [email or web address] Please call us on [phone] 24 hours prior to your scheduled appointment to confirm that you are able to attend. We will provide you information about how to log into video call software when you call.

## 2024-07-21 NOTE — ED NOTES
Writer scheduled patient for recommended outpatient appointment and sent two releases for established Therapy provider and newly scheduled Psychiatry provider to be electronically signed by the patient via verified email for their convenience/ease.

## 2024-07-22 ENCOUNTER — VIRTUAL VISIT (OUTPATIENT)
Dept: INTERNAL MEDICINE | Facility: CLINIC | Age: 29
End: 2024-07-22
Attending: EMERGENCY MEDICINE
Payer: COMMERCIAL

## 2024-07-22 ENCOUNTER — TELEPHONE (OUTPATIENT)
Dept: BEHAVIORAL HEALTH | Facility: CLINIC | Age: 29
End: 2024-07-22

## 2024-07-22 DIAGNOSIS — Z53.9 ERRONEOUS ENCOUNTER--DISREGARD: Primary | ICD-10-CM

## 2024-07-22 DIAGNOSIS — F41.9 ANXIETY AND DEPRESSION: ICD-10-CM

## 2024-07-22 DIAGNOSIS — F32.A ANXIETY AND DEPRESSION: ICD-10-CM

## 2024-07-22 NOTE — TELEPHONE ENCOUNTER
Per chart review for [facility] placement, pt discharged from the ED on 7/21/24 @ 11:45AM    Pt removed from [facility] and [facility] is no longer following

## 2024-07-22 NOTE — PROGRESS NOTES
"Monica is a 29 year old who is being evaluated via a billable video visit.    How would you like to obtain your AVS? MyChart  If the video visit is dropped, the invitation should be resent by: Text to cell phone: [phone]  Will anyone else be joining your video visit? No  {If patient encounters technical issues they should call [phone] :355863}    {PROVIDER CHARTING PREFERENCE:276294}    Subjective   Monica is a 29 year old, presenting for the following health issues:  Recheck Medication  {(!) Visit Details have not yet been documented.  Please enter Visit Details and then use this list to pull in documentation. (Optional):628769}    Video Start Time: {video visit start/end time for provider to select:832505}    HPI     {SUPERLIST (Optional):577740}  {additonal problems for provider to add (Optional):239376}    {ROS Picklists (Optional):316023}      Objective           Vitals:  No vitals were obtained today due to virtual visit.    Physical Exam   {video visit exam brief selected:840526}    {Diagnostic Test Results (Optional):282424}      Video-Visit Details    Type of service:  Video Visit   Video End Time:{video visit start/end time for provider to select:021940}  Originating Location (pt. Location): {video visit patient location:286397::\"Home\"}  {PROVIDER LOCATION On-site should be selected for visits conducted from your clinic location or adjoining [facility] hospital, academic office, or other nearby [facility] building. Off-site should be selected for all other provider locations, including home:841319}  Distant Location (provider location):  {virtual location provider:832736}  Platform used for Video Visit: {Virtual Visit Platforms:962351::\"WiN MS\"}  Signed Electronically by: RODNEY Hinds CNP  {Email feedback regarding this note to [email or web address]   :405133}  "

## 2024-07-22 NOTE — TELEPHONE ENCOUNTER
Triage and Transition Services- Patient Follow Up Call  Service Line Making Phone Call: Extended Care    Who did Writer Talk to: Patient    Details of Call: Left message with appointment information and callback number. Removed old phone number from contacts.     Bianca Herrera 7/22/2024 10:12 AM

## 2024-08-10 ENCOUNTER — HEALTH MAINTENANCE LETTER (OUTPATIENT)
Age: 29
End: 2024-08-10

## 2025-01-03 PROBLEM — R87.612 PAPANICOLAOU SMEAR OF CERVIX WITH LOW GRADE SQUAMOUS INTRAEPITHELIAL LESION (LGSIL): Status: ACTIVE | Noted: 2019-05-16

## 2025-07-09 ENCOUNTER — TELEPHONE (OUTPATIENT)
Dept: FAMILY MEDICINE | Facility: CLINIC | Age: 30
End: 2025-07-09
Payer: COMMERCIAL

## 2025-07-09 DIAGNOSIS — R87.612 PAPANICOLAOU SMEAR OF CERVIX WITH LOW GRADE SQUAMOUS INTRAEPITHELIAL LESION (LGSIL): Primary | ICD-10-CM

## 2025-07-09 NOTE — TELEPHONE ENCOUNTER
Reached Monica. Said she is being seen at a different [place] clinic-had pap follow up 2months ago and was normal.  Rev'd will update her chart and remove from our tracking.  Patricia STEVENS CNM, OB/Reproductive [facility]

## 2025-08-16 ENCOUNTER — HEALTH MAINTENANCE LETTER (OUTPATIENT)
Age: 30
End: 2025-08-16